# Patient Record
Sex: MALE | Race: WHITE | Employment: OTHER | URBAN - METROPOLITAN AREA
[De-identification: names, ages, dates, MRNs, and addresses within clinical notes are randomized per-mention and may not be internally consistent; named-entity substitution may affect disease eponyms.]

---

## 2017-01-01 ENCOUNTER — ANESTHESIA EVENT (OUTPATIENT)
Dept: SURGERY | Age: 82
DRG: 502 | End: 2017-01-01
Payer: MEDICARE

## 2017-01-01 ENCOUNTER — DOCUMENTATION ONLY (OUTPATIENT)
Dept: FAMILY MEDICINE CLINIC | Age: 82
End: 2017-01-01

## 2017-01-01 ENCOUNTER — HOME CARE VISIT (OUTPATIENT)
Dept: SCHEDULING | Facility: HOME HEALTH | Age: 82
End: 2017-01-01
Payer: MEDICARE

## 2017-01-01 ENCOUNTER — HOSPITAL ENCOUNTER (OUTPATIENT)
Age: 82
Setting detail: OBSERVATION
Discharge: HOME HEALTH | End: 2017-08-04
Attending: EMERGENCY MEDICINE | Admitting: FAMILY MEDICINE
Payer: MEDICARE

## 2017-01-01 ENCOUNTER — APPOINTMENT (OUTPATIENT)
Dept: GENERAL RADIOLOGY | Age: 82
End: 2017-01-01
Attending: EMERGENCY MEDICINE
Payer: MEDICARE

## 2017-01-01 ENCOUNTER — DOCUMENTATION ONLY (OUTPATIENT)
Dept: CARDIOLOGY CLINIC | Age: 82
End: 2017-01-01

## 2017-01-01 ENCOUNTER — APPOINTMENT (OUTPATIENT)
Dept: GENERAL RADIOLOGY | Age: 82
DRG: 502 | End: 2017-01-01
Attending: EMERGENCY MEDICINE
Payer: MEDICARE

## 2017-01-01 ENCOUNTER — PATIENT OUTREACH (OUTPATIENT)
Dept: FAMILY MEDICINE CLINIC | Age: 82
End: 2017-01-01

## 2017-01-01 ENCOUNTER — TELEPHONE (OUTPATIENT)
Dept: FAMILY MEDICINE CLINIC | Age: 82
End: 2017-01-01

## 2017-01-01 ENCOUNTER — PATIENT OUTREACH (OUTPATIENT)
Dept: CASE MANAGEMENT | Age: 82
End: 2017-01-01

## 2017-01-01 ENCOUNTER — CLINICAL SUPPORT (OUTPATIENT)
Dept: CARDIOLOGY CLINIC | Age: 82
End: 2017-01-01

## 2017-01-01 ENCOUNTER — OFFICE VISIT (OUTPATIENT)
Dept: FAMILY MEDICINE CLINIC | Age: 82
End: 2017-01-01

## 2017-01-01 ENCOUNTER — HOSPITAL ENCOUNTER (INPATIENT)
Age: 82
LOS: 6 days | Discharge: NURSING, REHAB OR LONG-TERM CARE | DRG: 502 | End: 2017-09-06
Attending: EMERGENCY MEDICINE | Admitting: FAMILY MEDICINE
Payer: MEDICARE

## 2017-01-01 ENCOUNTER — HOSPITAL ENCOUNTER (OUTPATIENT)
Dept: LAB | Age: 82
Discharge: HOME | End: 2017-04-04
Payer: MEDICARE

## 2017-01-01 ENCOUNTER — TELEPHONE (OUTPATIENT)
Dept: CARDIOLOGY CLINIC | Age: 82
End: 2017-01-01

## 2017-01-01 ENCOUNTER — HOME CARE VISIT (OUTPATIENT)
Dept: HOME HEALTH SERVICES | Facility: HOME HEALTH | Age: 82
End: 2017-01-01
Payer: MEDICARE

## 2017-01-01 ENCOUNTER — HOME HEALTH ADMISSION (OUTPATIENT)
Dept: HOME HEALTH SERVICES | Facility: HOME HEALTH | Age: 82
End: 2017-01-01
Payer: MEDICARE

## 2017-01-01 ENCOUNTER — OFFICE VISIT (OUTPATIENT)
Dept: CARDIOLOGY CLINIC | Age: 82
End: 2017-01-01

## 2017-01-01 ENCOUNTER — ANESTHESIA (OUTPATIENT)
Dept: SURGERY | Age: 82
DRG: 502 | End: 2017-01-01
Payer: MEDICARE

## 2017-01-01 ENCOUNTER — APPOINTMENT (OUTPATIENT)
Dept: CT IMAGING | Age: 82
End: 2017-01-01
Attending: EMERGENCY MEDICINE
Payer: MEDICARE

## 2017-01-01 ENCOUNTER — APPOINTMENT (OUTPATIENT)
Dept: GENERAL RADIOLOGY | Age: 82
End: 2017-01-01
Attending: FAMILY MEDICINE
Payer: MEDICARE

## 2017-01-01 VITALS
TEMPERATURE: 98.8 F | HEART RATE: 70 BPM | SYSTOLIC BLOOD PRESSURE: 118 MMHG | OXYGEN SATURATION: 96 % | RESPIRATION RATE: 18 BRPM | DIASTOLIC BLOOD PRESSURE: 67 MMHG | WEIGHT: 205.4 LBS | HEIGHT: 74 IN | BODY MASS INDEX: 26.36 KG/M2

## 2017-01-01 VITALS
RESPIRATION RATE: 16 BRPM | HEART RATE: 72 BPM | TEMPERATURE: 99.1 F | OXYGEN SATURATION: 98 % | SYSTOLIC BLOOD PRESSURE: 142 MMHG | DIASTOLIC BLOOD PRESSURE: 82 MMHG

## 2017-01-01 VITALS
DIASTOLIC BLOOD PRESSURE: 84 MMHG | RESPIRATION RATE: 16 BRPM | TEMPERATURE: 98.6 F | HEART RATE: 84 BPM | SYSTOLIC BLOOD PRESSURE: 146 MMHG | OXYGEN SATURATION: 98 %

## 2017-01-01 VITALS
HEART RATE: 75 BPM | SYSTOLIC BLOOD PRESSURE: 150 MMHG | TEMPERATURE: 97.9 F | DIASTOLIC BLOOD PRESSURE: 88 MMHG | OXYGEN SATURATION: 97 %

## 2017-01-01 VITALS
HEIGHT: 75 IN | RESPIRATION RATE: 16 BRPM | HEART RATE: 76 BPM | DIASTOLIC BLOOD PRESSURE: 70 MMHG | WEIGHT: 208 LBS | OXYGEN SATURATION: 97 % | SYSTOLIC BLOOD PRESSURE: 110 MMHG | BODY MASS INDEX: 25.86 KG/M2

## 2017-01-01 VITALS
TEMPERATURE: 98.7 F | RESPIRATION RATE: 18 BRPM | DIASTOLIC BLOOD PRESSURE: 79 MMHG | OXYGEN SATURATION: 96 % | SYSTOLIC BLOOD PRESSURE: 130 MMHG | HEART RATE: 71 BPM

## 2017-01-01 VITALS
SYSTOLIC BLOOD PRESSURE: 110 MMHG | RESPIRATION RATE: 16 BRPM | HEART RATE: 87 BPM | DIASTOLIC BLOOD PRESSURE: 70 MMHG | TEMPERATURE: 97.9 F | OXYGEN SATURATION: 94 %

## 2017-01-01 VITALS
HEIGHT: 74 IN | DIASTOLIC BLOOD PRESSURE: 83 MMHG | BODY MASS INDEX: 25.61 KG/M2 | OXYGEN SATURATION: 93 % | RESPIRATION RATE: 16 BRPM | WEIGHT: 199.52 LBS | HEART RATE: 79 BPM | TEMPERATURE: 98.7 F | SYSTOLIC BLOOD PRESSURE: 154 MMHG

## 2017-01-01 VITALS
HEART RATE: 68 BPM | OXYGEN SATURATION: 94 % | DIASTOLIC BLOOD PRESSURE: 75 MMHG | TEMPERATURE: 97.7 F | SYSTOLIC BLOOD PRESSURE: 139 MMHG

## 2017-01-01 VITALS
DIASTOLIC BLOOD PRESSURE: 78 MMHG | SYSTOLIC BLOOD PRESSURE: 116 MMHG | HEART RATE: 78 BPM | TEMPERATURE: 97.6 F | RESPIRATION RATE: 18 BRPM | OXYGEN SATURATION: 98 %

## 2017-01-01 VITALS
OXYGEN SATURATION: 98 % | RESPIRATION RATE: 18 BRPM | SYSTOLIC BLOOD PRESSURE: 122 MMHG | DIASTOLIC BLOOD PRESSURE: 76 MMHG | TEMPERATURE: 97.6 F

## 2017-01-01 VITALS
HEART RATE: 78 BPM | SYSTOLIC BLOOD PRESSURE: 136 MMHG | OXYGEN SATURATION: 98 % | TEMPERATURE: 99.3 F | DIASTOLIC BLOOD PRESSURE: 68 MMHG

## 2017-01-01 VITALS
TEMPERATURE: 97.7 F | HEIGHT: 74 IN | RESPIRATION RATE: 18 BRPM | DIASTOLIC BLOOD PRESSURE: 79 MMHG | BODY MASS INDEX: 28.49 KG/M2 | OXYGEN SATURATION: 92 % | HEART RATE: 80 BPM | SYSTOLIC BLOOD PRESSURE: 124 MMHG | WEIGHT: 222 LBS

## 2017-01-01 VITALS
BODY MASS INDEX: 28.49 KG/M2 | HEART RATE: 52 BPM | SYSTOLIC BLOOD PRESSURE: 120 MMHG | OXYGEN SATURATION: 95 % | TEMPERATURE: 97.9 F | WEIGHT: 222 LBS | DIASTOLIC BLOOD PRESSURE: 66 MMHG | HEIGHT: 74 IN | RESPIRATION RATE: 16 BRPM

## 2017-01-01 VITALS
HEART RATE: 72 BPM | SYSTOLIC BLOOD PRESSURE: 123 MMHG | DIASTOLIC BLOOD PRESSURE: 72 MMHG | TEMPERATURE: 97.4 F | OXYGEN SATURATION: 93 %

## 2017-01-01 VITALS
DIASTOLIC BLOOD PRESSURE: 74 MMHG | HEART RATE: 78 BPM | SYSTOLIC BLOOD PRESSURE: 126 MMHG | OXYGEN SATURATION: 98 % | RESPIRATION RATE: 18 BRPM | TEMPERATURE: 97.4 F

## 2017-01-01 VITALS
TEMPERATURE: 98.1 F | SYSTOLIC BLOOD PRESSURE: 120 MMHG | OXYGEN SATURATION: 94 % | HEART RATE: 80 BPM | DIASTOLIC BLOOD PRESSURE: 84 MMHG | RESPIRATION RATE: 16 BRPM

## 2017-01-01 VITALS
RESPIRATION RATE: 16 BRPM | SYSTOLIC BLOOD PRESSURE: 122 MMHG | DIASTOLIC BLOOD PRESSURE: 76 MMHG | HEART RATE: 86 BPM | HEIGHT: 74 IN

## 2017-01-01 VITALS
OXYGEN SATURATION: 96 % | DIASTOLIC BLOOD PRESSURE: 70 MMHG | TEMPERATURE: 97.5 F | RESPIRATION RATE: 20 BRPM | SYSTOLIC BLOOD PRESSURE: 148 MMHG | HEART RATE: 86 BPM

## 2017-01-01 DIAGNOSIS — R73.9 HYPERGLYCEMIA: ICD-10-CM

## 2017-01-01 DIAGNOSIS — I49.5 TACHYCARDIA-BRADYCARDIA SYNDROME (HCC): Primary | ICD-10-CM

## 2017-01-01 DIAGNOSIS — Z00.00 MEDICARE ANNUAL WELLNESS VISIT, SUBSEQUENT: Primary | ICD-10-CM

## 2017-01-01 DIAGNOSIS — T73.0XXA STARVATION, INITIAL ENCOUNTER: Primary | ICD-10-CM

## 2017-01-01 DIAGNOSIS — R53.1 WEAKNESS GENERALIZED: ICD-10-CM

## 2017-01-01 DIAGNOSIS — I48.0 PAROXYSMAL ATRIAL FIBRILLATION (HCC): ICD-10-CM

## 2017-01-01 DIAGNOSIS — I49.5 TACHYCARDIA-BRADYCARDIA SYNDROME (HCC): ICD-10-CM

## 2017-01-01 DIAGNOSIS — Z13.39 SCREENING FOR ALCOHOLISM: ICD-10-CM

## 2017-01-01 DIAGNOSIS — R41.82 ALTERED MENTAL STATUS, UNSPECIFIED ALTERED MENTAL STATUS TYPE: Primary | ICD-10-CM

## 2017-01-01 DIAGNOSIS — F03.90 DEMENTIA WITHOUT BEHAVIORAL DISTURBANCE, UNSPECIFIED DEMENTIA TYPE: ICD-10-CM

## 2017-01-01 DIAGNOSIS — R26.9 GAIT DISTURBANCE: Primary | ICD-10-CM

## 2017-01-01 DIAGNOSIS — G31.1 SENILE DEGENERATION OF BRAIN, NOT ELSEWHERE CLASSIFIED (HCC): Primary | ICD-10-CM

## 2017-01-01 DIAGNOSIS — Z95.0 CARDIAC PACEMAKER IN SITU: Primary | ICD-10-CM

## 2017-01-01 DIAGNOSIS — I10 ESSENTIAL HYPERTENSION: ICD-10-CM

## 2017-01-01 DIAGNOSIS — M85.80 OSTEOPENIA, UNSPECIFIED LOCATION: ICD-10-CM

## 2017-01-01 DIAGNOSIS — I35.0 AORTIC VALVE STENOSIS, UNSPECIFIED ETIOLOGY: ICD-10-CM

## 2017-01-01 DIAGNOSIS — E87.6 HYPOKALEMIA: ICD-10-CM

## 2017-01-01 DIAGNOSIS — R41.82 ALTERED MENTAL STATUS, UNSPECIFIED ALTERED MENTAL STATUS TYPE: ICD-10-CM

## 2017-01-01 DIAGNOSIS — I25.10 CORONARY ARTERY DISEASE INVOLVING NATIVE CORONARY ARTERY OF NATIVE HEART WITHOUT ANGINA PECTORIS: Primary | ICD-10-CM

## 2017-01-01 DIAGNOSIS — M19.90 ARTHRITIS: ICD-10-CM

## 2017-01-01 DIAGNOSIS — I25.10 CORONARY ARTERY DISEASE INVOLVING NATIVE CORONARY ARTERY OF NATIVE HEART WITHOUT ANGINA PECTORIS: ICD-10-CM

## 2017-01-01 DIAGNOSIS — M70.41 PREPATELLAR BURSITIS OF RIGHT KNEE: Primary | ICD-10-CM

## 2017-01-01 DIAGNOSIS — R50.9 FEVER, UNSPECIFIED FEVER CAUSE: Primary | ICD-10-CM

## 2017-01-01 DIAGNOSIS — I35.0 AORTIC VALVE STENOSIS, ETIOLOGY OF CARDIAC VALVE DISEASE UNSPECIFIED: ICD-10-CM

## 2017-01-01 DIAGNOSIS — E55.9 VITAMIN D DEFICIENCY: ICD-10-CM

## 2017-01-01 DIAGNOSIS — E78.00 PURE HYPERCHOLESTEROLEMIA: ICD-10-CM

## 2017-01-01 LAB
25(OH)D3+25(OH)D2 SERPL-MCNC: 29.4 NG/ML (ref 30–100)
ALBUMIN SERPL BCP-MCNC: 3.6 G/DL (ref 3.5–5)
ALBUMIN SERPL-MCNC: 3.2 G/DL (ref 3.5–5)
ALBUMIN SERPL-MCNC: 4.5 G/DL (ref 3.5–4.7)
ALBUMIN/GLOB SERPL: 0.6 {RATIO} (ref 1.1–2.2)
ALBUMIN/GLOB SERPL: 1 {RATIO} (ref 1.1–2.2)
ALBUMIN/GLOB SERPL: 1.9 {RATIO} (ref 1.2–2.2)
ALP SERPL-CCNC: 55 U/L (ref 45–117)
ALP SERPL-CCNC: 57 IU/L (ref 39–117)
ALP SERPL-CCNC: 88 U/L (ref 45–117)
ALT SERPL-CCNC: 16 IU/L (ref 0–44)
ALT SERPL-CCNC: 19 U/L (ref 12–78)
ALT SERPL-CCNC: 27 U/L (ref 12–78)
AMMONIA PLAS-SCNC: 19 UMOL/L
ANION GAP BLD CALC-SCNC: 6 MMOL/L (ref 5–15)
ANION GAP BLD CALC-SCNC: 8 MMOL/L (ref 5–15)
ANION GAP SERPL CALC-SCNC: 10 MMOL/L (ref 5–15)
ANION GAP SERPL CALC-SCNC: 7 MMOL/L (ref 5–15)
ANION GAP SERPL CALC-SCNC: 8 MMOL/L (ref 5–15)
ANION GAP SERPL CALC-SCNC: 8 MMOL/L (ref 5–15)
APPEARANCE FLD: ABNORMAL
APPEARANCE UR: ABNORMAL
APPEARANCE UR: CLEAR
APPEARANCE UR: CLEAR
ARTERIAL PATENCY WRIST A: ABNORMAL
AST SERPL W P-5'-P-CCNC: 18 U/L (ref 15–37)
AST SERPL-CCNC: 16 U/L (ref 15–37)
AST SERPL-CCNC: 21 IU/L (ref 0–40)
ATRIAL RATE: 85 BPM
ATRIAL RATE: 85 BPM
BACTERIA SPEC CULT: ABNORMAL
BACTERIA SPEC CULT: NORMAL
BACTERIA URNS QL MICRO: NEGATIVE /HPF
BACTERIA URNS QL MICRO: NEGATIVE /HPF
BASE EXCESS BLD CALC-SCNC: 5 MMOL/L
BASOPHILS # BLD AUTO: 0 K/UL (ref 0–0.1)
BASOPHILS # BLD AUTO: 0 X10E3/UL (ref 0–0.2)
BASOPHILS # BLD: 0 % (ref 0–1)
BASOPHILS # BLD: 0 K/UL (ref 0–0.1)
BASOPHILS NFR BLD AUTO: 1 %
BASOPHILS NFR BLD: 0 % (ref 0–1)
BASOPHILS NFR BLD: 0 % (ref 0–1)
BASOPHILS NFR BLD: 1 % (ref 0–1)
BDY SITE: ABNORMAL
BILIRUB SERPL-MCNC: 0.5 MG/DL (ref 0.2–1)
BILIRUB SERPL-MCNC: 0.5 MG/DL (ref 0–1.2)
BILIRUB SERPL-MCNC: 0.7 MG/DL (ref 0.2–1)
BILIRUB UR QL CFM: NEGATIVE
BILIRUB UR QL STRIP: NEGATIVE
BILIRUB UR QL: NEGATIVE
BODY FLD TYPE: NORMAL
BUN SERPL-MCNC: 10 MG/DL (ref 6–20)
BUN SERPL-MCNC: 10 MG/DL (ref 6–20)
BUN SERPL-MCNC: 12 MG/DL (ref 6–20)
BUN SERPL-MCNC: 13 MG/DL (ref 6–20)
BUN SERPL-MCNC: 13 MG/DL (ref 8–27)
BUN SERPL-MCNC: 14 MG/DL (ref 6–20)
BUN SERPL-MCNC: 14 MG/DL (ref 8–27)
BUN SERPL-MCNC: 15 MG/DL (ref 6–20)
BUN/CREAT SERPL: 13 (ref 12–20)
BUN/CREAT SERPL: 14 (ref 10–24)
BUN/CREAT SERPL: 14 (ref 10–24)
BUN/CREAT SERPL: 14 (ref 12–20)
BUN/CREAT SERPL: 15 (ref 12–20)
BUN/CREAT SERPL: 16 (ref 12–20)
CA-I BLD-SCNC: 1.32 MMOL/L (ref 1.12–1.32)
CALCIUM SERPL-MCNC: 10.2 MG/DL (ref 8.6–10.2)
CALCIUM SERPL-MCNC: 10.2 MG/DL (ref 8.6–10.2)
CALCIUM SERPL-MCNC: 10.3 MG/DL (ref 8.5–10.1)
CALCIUM SERPL-MCNC: 9 MG/DL (ref 8.5–10.1)
CALCIUM SERPL-MCNC: 9 MG/DL (ref 8.5–10.1)
CALCIUM SERPL-MCNC: 9.2 MG/DL (ref 8.5–10.1)
CALCIUM SERPL-MCNC: 9.3 MG/DL (ref 8.5–10.1)
CALCIUM SERPL-MCNC: 9.8 MG/DL (ref 8.5–10.1)
CALCULATED P AXIS, ECG09: 23 DEGREES
CALCULATED P AXIS, ECG09: 46 DEGREES
CALCULATED R AXIS, ECG10: 20 DEGREES
CALCULATED R AXIS, ECG10: 31 DEGREES
CALCULATED T AXIS, ECG11: 29 DEGREES
CALCULATED T AXIS, ECG11: 41 DEGREES
CC UR VC: NORMAL
CHLORIDE SERPL-SCNC: 100 MMOL/L (ref 97–108)
CHLORIDE SERPL-SCNC: 101 MMOL/L (ref 96–106)
CHLORIDE SERPL-SCNC: 101 MMOL/L (ref 96–106)
CHLORIDE SERPL-SCNC: 102 MMOL/L (ref 97–108)
CHLORIDE SERPL-SCNC: 102 MMOL/L (ref 97–108)
CHLORIDE SERPL-SCNC: 103 MMOL/L (ref 97–108)
CHLORIDE SERPL-SCNC: 104 MMOL/L (ref 97–108)
CHLORIDE SERPL-SCNC: 106 MMOL/L (ref 97–108)
CHOLEST SERPL-MCNC: 177 MG/DL
CHOLEST SERPL-MCNC: 226 MG/DL (ref 100–199)
CO2 SERPL-SCNC: 23 MMOL/L (ref 18–29)
CO2 SERPL-SCNC: 23 MMOL/L (ref 18–29)
CO2 SERPL-SCNC: 23 MMOL/L (ref 21–32)
CO2 SERPL-SCNC: 24 MMOL/L (ref 21–32)
CO2 SERPL-SCNC: 25 MMOL/L (ref 21–32)
CO2 SERPL-SCNC: 26 MMOL/L (ref 21–32)
CO2 SERPL-SCNC: 27 MMOL/L (ref 21–32)
CO2 SERPL-SCNC: 27 MMOL/L (ref 21–32)
COLOR FLD: ABNORMAL
COLOR UR: ABNORMAL
COLOR UR: NORMAL
COLOR UR: YELLOW
CREAT SERPL-MCNC: 0.76 MG/DL (ref 0.7–1.3)
CREAT SERPL-MCNC: 0.78 MG/DL (ref 0.7–1.3)
CREAT SERPL-MCNC: 0.86 MG/DL (ref 0.7–1.3)
CREAT SERPL-MCNC: 0.95 MG/DL (ref 0.7–1.3)
CREAT SERPL-MCNC: 0.96 MG/DL (ref 0.76–1.27)
CREAT SERPL-MCNC: 0.96 MG/DL (ref 0.7–1.3)
CREAT SERPL-MCNC: 0.98 MG/DL (ref 0.76–1.27)
CREAT SERPL-MCNC: 1.01 MG/DL (ref 0.7–1.3)
CRP SERPL-MCNC: 14.8 MG/DL (ref 0–0.6)
CRYSTALS FLD MICRO: NORMAL
D DIMER PPP FEU-MCNC: 1.88 MG/L FEU (ref 0–0.65)
DIAGNOSIS, 93000: NORMAL
DIAGNOSIS, 93000: NORMAL
DIFFERENTIAL METHOD BLD: ABNORMAL
EOSINOPHIL # BLD AUTO: 0.2 X10E3/UL (ref 0–0.4)
EOSINOPHIL # BLD: 0 K/UL (ref 0–0.4)
EOSINOPHIL # BLD: 0 K/UL (ref 0–0.4)
EOSINOPHIL # BLD: 0.1 K/UL (ref 0–0.4)
EOSINOPHIL # BLD: 0.2 K/UL (ref 0–0.4)
EOSINOPHIL NFR BLD AUTO: 4 %
EOSINOPHIL NFR BLD: 0 % (ref 0–7)
EOSINOPHIL NFR BLD: 0 % (ref 0–7)
EOSINOPHIL NFR BLD: 1 % (ref 0–7)
EOSINOPHIL NFR BLD: 3 % (ref 0–7)
EPITH CASTS URNS QL MICRO: ABNORMAL /LPF
EPITH CASTS URNS QL MICRO: NORMAL /LPF
ERYTHROCYTE [DISTWIDTH] IN BLOOD BY AUTOMATED COUNT: 12.5 % (ref 11.5–14.5)
ERYTHROCYTE [DISTWIDTH] IN BLOOD BY AUTOMATED COUNT: 12.6 % (ref 11.5–14.5)
ERYTHROCYTE [DISTWIDTH] IN BLOOD BY AUTOMATED COUNT: 12.6 % (ref 11.5–14.5)
ERYTHROCYTE [DISTWIDTH] IN BLOOD BY AUTOMATED COUNT: 12.9 % (ref 11.5–14.5)
ERYTHROCYTE [DISTWIDTH] IN BLOOD BY AUTOMATED COUNT: 14 % (ref 12.3–15.4)
ERYTHROCYTE [SEDIMENTATION RATE] IN BLOOD: 57 MM/HR (ref 0–20)
EST. AVERAGE GLUCOSE BLD GHB EST-MCNC: 117 MG/DL
GAS FLOW.O2 O2 DELIVERY SYS: ABNORMAL L/MIN
GLOBULIN SER CALC-MCNC: 2.4 G/DL (ref 1.5–4.5)
GLOBULIN SER CALC-MCNC: 3.6 G/DL (ref 2–4)
GLOBULIN SER CALC-MCNC: 5 G/DL (ref 2–4)
GLUCOSE SERPL-MCNC: 105 MG/DL (ref 65–100)
GLUCOSE SERPL-MCNC: 108 MG/DL (ref 65–100)
GLUCOSE SERPL-MCNC: 111 MG/DL (ref 65–100)
GLUCOSE SERPL-MCNC: 120 MG/DL (ref 65–100)
GLUCOSE SERPL-MCNC: 126 MG/DL (ref 65–100)
GLUCOSE SERPL-MCNC: 127 MG/DL (ref 65–100)
GLUCOSE SERPL-MCNC: 149 MG/DL (ref 65–99)
GLUCOSE SERPL-MCNC: 97 MG/DL (ref 65–99)
GLUCOSE UR QL: NEGATIVE
GLUCOSE UR STRIP.AUTO-MCNC: NEGATIVE MG/DL
GLUCOSE UR STRIP.AUTO-MCNC: NEGATIVE MG/DL
GRAM STN SPEC: ABNORMAL
HBA1C MFR BLD: 5.7 % (ref 4.8–5.6)
HCO3 BLD-SCNC: 28.2 MMOL/L (ref 22–26)
HCT VFR BLD AUTO: 34.4 % (ref 36.6–50.3)
HCT VFR BLD AUTO: 34.9 % (ref 36.6–50.3)
HCT VFR BLD AUTO: 38.6 % (ref 36.6–50.3)
HCT VFR BLD AUTO: 39.7 % (ref 37.5–51)
HCT VFR BLD AUTO: 40 % (ref 36.6–50.3)
HDLC SERPL-MCNC: 49 MG/DL
HDLC SERPL-MCNC: 50 MG/DL
HDLC SERPL: 3.6 {RATIO} (ref 0–5)
HGB BLD-MCNC: 11.5 G/DL (ref 12.1–17)
HGB BLD-MCNC: 11.5 G/DL (ref 12.1–17)
HGB BLD-MCNC: 13 G/DL (ref 12.1–17)
HGB BLD-MCNC: 13.4 G/DL (ref 12.1–17)
HGB BLD-MCNC: 13.4 G/DL (ref 12.6–17.7)
HGB UR QL STRIP: NEGATIVE
HYALINE CASTS URNS QL MICRO: ABNORMAL /LPF (ref 0–5)
HYALINE CASTS URNS QL MICRO: NORMAL /LPF (ref 0–5)
IMM GRANULOCYTES # BLD: 0 X10E3/UL (ref 0–0.1)
IMM GRANULOCYTES NFR BLD: 0 %
INTERPRETATION, 910389: NORMAL
KETONES UR QL STRIP.AUTO: NEGATIVE MG/DL
KETONES UR QL STRIP.AUTO: NEGATIVE MG/DL
KETONES UR QL STRIP: NEGATIVE
LACTATE SERPL-SCNC: 0.7 MMOL/L (ref 0.4–2)
LACTATE SERPL-SCNC: 0.8 MMOL/L (ref 0.4–2)
LACTATE SERPL-SCNC: 1.2 MMOL/L (ref 0.4–2)
LACTATE SERPL-SCNC: 1.4 MMOL/L (ref 0.4–2)
LACTATE SERPL-SCNC: 1.9 MMOL/L (ref 0.4–2)
LDLC SERPL CALC-MCNC: 110 MG/DL (ref 0–100)
LDLC SERPL CALC-MCNC: 149 MG/DL (ref 0–99)
LEUKOCYTE ESTERASE UR QL STRIP.AUTO: NEGATIVE
LEUKOCYTE ESTERASE UR QL STRIP.AUTO: NEGATIVE
LEUKOCYTE ESTERASE UR QL STRIP: NEGATIVE
LIPID PROFILE,FLP: ABNORMAL
LYMPHOCYTES # BLD AUTO: 1.6 X10E3/UL (ref 0.7–3.1)
LYMPHOCYTES # BLD AUTO: 21 % (ref 12–49)
LYMPHOCYTES # BLD: 0.8 K/UL (ref 0.8–3.5)
LYMPHOCYTES # BLD: 1.1 K/UL (ref 0.8–3.5)
LYMPHOCYTES # BLD: 1.4 K/UL (ref 0.8–3.5)
LYMPHOCYTES # BLD: 1.5 K/UL (ref 0.8–3.5)
LYMPHOCYTES NFR BLD AUTO: 37 %
LYMPHOCYTES NFR BLD: 10 % (ref 12–49)
LYMPHOCYTES NFR BLD: 15 % (ref 12–49)
LYMPHOCYTES NFR BLD: 24 % (ref 12–49)
LYMPHOCYTES NFR FLD: 1 %
MAGNESIUM SERPL-MCNC: 1.9 MG/DL (ref 1.6–2.4)
MAGNESIUM SERPL-MCNC: 2.5 MG/DL (ref 1.6–2.3)
MCH RBC QN AUTO: 28.8 PG (ref 26–34)
MCH RBC QN AUTO: 29.1 PG (ref 26–34)
MCH RBC QN AUTO: 29.5 PG (ref 26–34)
MCH RBC QN AUTO: 29.8 PG (ref 26.6–33)
MCH RBC QN AUTO: 30.1 PG (ref 26–34)
MCHC RBC AUTO-ENTMCNC: 33 G/DL (ref 30–36.5)
MCHC RBC AUTO-ENTMCNC: 33.4 G/DL (ref 30–36.5)
MCHC RBC AUTO-ENTMCNC: 33.5 G/DL (ref 30–36.5)
MCHC RBC AUTO-ENTMCNC: 33.7 G/DL (ref 30–36.5)
MCHC RBC AUTO-ENTMCNC: 33.8 G/DL (ref 31.5–35.7)
MCV RBC AUTO: 87.1 FL (ref 80–99)
MCV RBC AUTO: 87.3 FL (ref 80–99)
MCV RBC AUTO: 88 FL (ref 79–97)
MCV RBC AUTO: 88.1 FL (ref 80–99)
MCV RBC AUTO: 89.4 FL (ref 80–99)
MICRO URNS: NORMAL
MONOCYTES # BLD AUTO: 0.3 X10E3/UL (ref 0.1–0.9)
MONOCYTES # BLD: 0.5 K/UL (ref 0–1)
MONOCYTES # BLD: 0.6 K/UL (ref 0–1)
MONOCYTES # BLD: 1 K/UL (ref 0–1)
MONOCYTES # BLD: 1.2 K/UL (ref 0–1)
MONOCYTES NFR BLD AUTO: 15 % (ref 5–13)
MONOCYTES NFR BLD AUTO: 7 %
MONOCYTES NFR BLD: 11 % (ref 5–13)
MONOCYTES NFR BLD: 11 % (ref 5–13)
MONOCYTES NFR BLD: 8 % (ref 5–13)
MONOS+MACROS NFR FLD: 2 %
NEUTROPHILS # BLD AUTO: 2.3 X10E3/UL (ref 1.4–7)
NEUTROPHILS NFR BLD AUTO: 51 %
NEUTROPHILS NFR FLD: 97 %
NEUTS SEG # BLD: 2.3 K/UL (ref 1.8–8)
NEUTS SEG # BLD: 4 K/UL (ref 1.8–8)
NEUTS SEG # BLD: 7.1 K/UL (ref 1.8–8)
NEUTS SEG # BLD: 9.1 K/UL (ref 1.8–8)
NEUTS SEG NFR BLD AUTO: 64 % (ref 32–75)
NEUTS SEG NFR BLD: 64 % (ref 32–75)
NEUTS SEG NFR BLD: 73 % (ref 32–75)
NEUTS SEG NFR BLD: 79 % (ref 32–75)
NITRITE UR QL STRIP.AUTO: NEGATIVE
NITRITE UR QL STRIP.AUTO: NEGATIVE
NITRITE UR QL STRIP: NEGATIVE
NUC CELL # FLD: ABNORMAL /CU MM (ref 0–5)
P-R INTERVAL, ECG05: 168 MS
P-R INTERVAL, ECG05: 170 MS
PATH REV BLD -IMP: ABNORMAL
PCO2 BLD: 38.3 MMHG (ref 35–45)
PH BLD: 7.48 [PH] (ref 7.35–7.45)
PH UR STRIP: 5 [PH] (ref 5–8)
PH UR STRIP: 6 [PH] (ref 5–8)
PH UR STRIP: 7 [PH] (ref 5–7.5)
PLATELET # BLD AUTO: 150 K/UL (ref 150–400)
PLATELET # BLD AUTO: 220 X10E3/UL (ref 150–379)
PLATELET # BLD AUTO: 237 K/UL (ref 150–400)
PLATELET # BLD AUTO: 261 K/UL (ref 150–400)
PLATELET # BLD AUTO: 280 K/UL (ref 150–400)
PO2 BLD: 31 MMHG (ref 80–100)
POTASSIUM SERPL-SCNC: 3.4 MMOL/L (ref 3.5–5.1)
POTASSIUM SERPL-SCNC: 3.6 MMOL/L (ref 3.5–5.1)
POTASSIUM SERPL-SCNC: 3.6 MMOL/L (ref 3.5–5.1)
POTASSIUM SERPL-SCNC: 3.7 MMOL/L (ref 3.5–5.1)
POTASSIUM SERPL-SCNC: 3.8 MMOL/L (ref 3.5–5.1)
POTASSIUM SERPL-SCNC: 4.6 MMOL/L (ref 3.5–5.2)
POTASSIUM SERPL-SCNC: 4.7 MMOL/L (ref 3.5–5.2)
PROT SERPL-MCNC: 6.9 G/DL (ref 6–8.5)
PROT SERPL-MCNC: 7.2 G/DL (ref 6.4–8.2)
PROT SERPL-MCNC: 8.2 G/DL (ref 6.4–8.2)
PROT UR QL STRIP: NEGATIVE
PROT UR STRIP-MCNC: NEGATIVE MG/DL
PROT UR STRIP-MCNC: NEGATIVE MG/DL
Q-T INTERVAL, ECG07: 342 MS
Q-T INTERVAL, ECG07: 352 MS
QRS DURATION, ECG06: 78 MS
QRS DURATION, ECG06: 78 MS
QTC CALCULATION (BEZET), ECG08: 406 MS
QTC CALCULATION (BEZET), ECG08: 418 MS
RBC # BLD AUTO: 3.95 M/UL (ref 4.1–5.7)
RBC # BLD AUTO: 4 M/UL (ref 4.1–5.7)
RBC # BLD AUTO: 4.32 M/UL (ref 4.1–5.7)
RBC # BLD AUTO: 4.5 X10E6/UL (ref 4.14–5.8)
RBC # BLD AUTO: 4.54 M/UL (ref 4.1–5.7)
RBC # FLD: >100 /CU MM
RBC #/AREA URNS HPF: ABNORMAL /HPF (ref 0–5)
RBC #/AREA URNS HPF: NORMAL /HPF (ref 0–5)
RBC MORPH BLD: ABNORMAL
SAO2 % BLD: 65 % (ref 92–97)
SERVICE CMNT-IMP: ABNORMAL
SERVICE CMNT-IMP: NORMAL
SODIUM SERPL-SCNC: 134 MMOL/L (ref 136–145)
SODIUM SERPL-SCNC: 135 MMOL/L (ref 136–145)
SODIUM SERPL-SCNC: 135 MMOL/L (ref 136–145)
SODIUM SERPL-SCNC: 136 MMOL/L (ref 136–145)
SODIUM SERPL-SCNC: 137 MMOL/L (ref 136–145)
SODIUM SERPL-SCNC: 139 MMOL/L (ref 134–144)
SODIUM SERPL-SCNC: 139 MMOL/L (ref 136–145)
SODIUM SERPL-SCNC: 140 MMOL/L (ref 134–144)
SP GR UR REFRACTOMETRY: 1.01 (ref 1–1.03)
SP GR UR REFRACTOMETRY: 1.02 (ref 1–1.03)
SP GR UR: 1.02 (ref 1–1.03)
SPECIMEN SOURCE FLD: ABNORMAL
SPECIMEN TYPE: ABNORMAL
TRIGL SERPL-MCNC: 135 MG/DL (ref 0–149)
TRIGL SERPL-MCNC: 90 MG/DL (ref ?–150)
TROPONIN I SERPL-MCNC: <0.04 NG/ML
TSH SERPL DL<=0.005 MIU/L-ACNC: 1.76 UIU/ML (ref 0.45–4.5)
TSH SERPL DL<=0.05 MIU/L-ACNC: 1.09 UIU/ML (ref 0.36–3.74)
UA: UC IF INDICATED,UAUC: NORMAL
URATE SERPL-MCNC: 3.7 MG/DL (ref 3.5–7.2)
UROBILINOGEN UR QL STRIP.AUTO: 0.2 EU/DL (ref 0.2–1)
UROBILINOGEN UR QL STRIP.AUTO: 0.2 EU/DL (ref 0.2–1)
UROBILINOGEN UR STRIP-MCNC: 0.2 MG/DL (ref 0.2–1)
VENTRICULAR RATE, ECG03: 85 BPM
VENTRICULAR RATE, ECG03: 85 BPM
VIT B12 SERPL-MCNC: 272 PG/ML (ref 211–911)
VIT B12 SERPL-MCNC: 444 PG/ML (ref 211–911)
VLDLC SERPL CALC-MCNC: 18 MG/DL
VLDLC SERPL CALC-MCNC: 27 MG/DL (ref 5–40)
WBC # BLD AUTO: 11.5 K/UL (ref 4.1–11.1)
WBC # BLD AUTO: 3.7 K/UL (ref 4.1–11.1)
WBC # BLD AUTO: 4.3 X10E3/UL (ref 3.4–10.8)
WBC # BLD AUTO: 6.3 K/UL (ref 4.1–11.1)
WBC # BLD AUTO: 9.7 K/UL (ref 4.1–11.1)
WBC URNS QL MICRO: ABNORMAL /HPF (ref 0–4)
WBC URNS QL MICRO: NORMAL /HPF (ref 0–4)

## 2017-01-01 PROCEDURE — 74011250636 HC RX REV CODE- 250/636: Performed by: INTERNAL MEDICINE

## 2017-01-01 PROCEDURE — 3331090002 HH PPS REVENUE DEBIT

## 2017-01-01 PROCEDURE — G8988 SELF CARE GOAL STATUS: HCPCS

## 2017-01-01 PROCEDURE — 3331090001 HH PPS REVENUE CREDIT

## 2017-01-01 PROCEDURE — 65270000029 HC RM PRIVATE

## 2017-01-01 PROCEDURE — 89050 BODY FLUID CELL COUNT: CPT | Performed by: PHYSICIAN ASSISTANT

## 2017-01-01 PROCEDURE — 84550 ASSAY OF BLOOD/URIC ACID: CPT | Performed by: EMERGENCY MEDICINE

## 2017-01-01 PROCEDURE — 77030013079 HC BLNKT BAIR HGGR 3M -A: Performed by: ANESTHESIOLOGY

## 2017-01-01 PROCEDURE — 74011250637 HC RX REV CODE- 250/637: Performed by: FAMILY MEDICINE

## 2017-01-01 PROCEDURE — G0299 HHS/HOSPICE OF RN EA 15 MIN: HCPCS

## 2017-01-01 PROCEDURE — 0MDN0ZZ EXTRACTION OF RIGHT KNEE BURSA AND LIGAMENT, OPEN APPROACH: ICD-10-PCS | Performed by: ORTHOPAEDIC SURGERY

## 2017-01-01 PROCEDURE — 74011250637 HC RX REV CODE- 250/637: Performed by: HOSPITALIST

## 2017-01-01 PROCEDURE — 80061 LIPID PANEL: CPT | Performed by: FAMILY MEDICINE

## 2017-01-01 PROCEDURE — 87147 CULTURE TYPE IMMUNOLOGIC: CPT | Performed by: PHYSICIAN ASSISTANT

## 2017-01-01 PROCEDURE — 77030005563 HC CATH URETH INT MMGH -A

## 2017-01-01 PROCEDURE — G0157 HHC PT ASSISTANT EA 15: HCPCS

## 2017-01-01 PROCEDURE — 74011250636 HC RX REV CODE- 250/636: Performed by: ORTHOPAEDIC SURGERY

## 2017-01-01 PROCEDURE — 96361 HYDRATE IV INFUSION ADD-ON: CPT

## 2017-01-01 PROCEDURE — 80048 BASIC METABOLIC PNL TOTAL CA: CPT | Performed by: FAMILY MEDICINE

## 2017-01-01 PROCEDURE — 89060 EXAM SYNOVIAL FLUID CRYSTALS: CPT | Performed by: PHYSICIAN ASSISTANT

## 2017-01-01 PROCEDURE — G0151 HHCP-SERV OF PT,EA 15 MIN: HCPCS

## 2017-01-01 PROCEDURE — 36415 COLL VENOUS BLD VENIPUNCTURE: CPT | Performed by: FAMILY MEDICINE

## 2017-01-01 PROCEDURE — 87040 BLOOD CULTURE FOR BACTERIA: CPT | Performed by: EMERGENCY MEDICINE

## 2017-01-01 PROCEDURE — 99285 EMERGENCY DEPT VISIT HI MDM: CPT

## 2017-01-01 PROCEDURE — 74011250636 HC RX REV CODE- 250/636: Performed by: FAMILY MEDICINE

## 2017-01-01 PROCEDURE — 85025 COMPLETE CBC W/AUTO DIFF WBC: CPT | Performed by: FAMILY MEDICINE

## 2017-01-01 PROCEDURE — 85025 COMPLETE CBC W/AUTO DIFF WBC: CPT | Performed by: HOSPITALIST

## 2017-01-01 PROCEDURE — 0M9N3ZX DRAINAGE OF RIGHT KNEE BURSA AND LIGAMENT, PERCUTANEOUS APPROACH, DIAGNOSTIC: ICD-10-PCS | Performed by: ORTHOPAEDIC SURGERY

## 2017-01-01 PROCEDURE — 74011250636 HC RX REV CODE- 250/636: Performed by: EMERGENCY MEDICINE

## 2017-01-01 PROCEDURE — 97161 PT EVAL LOW COMPLEX 20 MIN: CPT

## 2017-01-01 PROCEDURE — 87186 SC STD MICRODIL/AGAR DIL: CPT | Performed by: PHYSICIAN ASSISTANT

## 2017-01-01 PROCEDURE — 80053 COMPREHEN METABOLIC PANEL: CPT | Performed by: EMERGENCY MEDICINE

## 2017-01-01 PROCEDURE — 76210000016 HC OR PH I REC 1 TO 1.5 HR: Performed by: ORTHOPAEDIC SURGERY

## 2017-01-01 PROCEDURE — 93971 EXTREMITY STUDY: CPT

## 2017-01-01 PROCEDURE — 85025 COMPLETE CBC W/AUTO DIFF WBC: CPT | Performed by: EMERGENCY MEDICINE

## 2017-01-01 PROCEDURE — 87205 SMEAR GRAM STAIN: CPT | Performed by: PHYSICIAN ASSISTANT

## 2017-01-01 PROCEDURE — 74011250636 HC RX REV CODE- 250/636

## 2017-01-01 PROCEDURE — 77030011640 HC PAD GRND REM COVD -A: Performed by: ORTHOPAEDIC SURGERY

## 2017-01-01 PROCEDURE — 0M9N0ZX DRAINAGE OF RIGHT KNEE BURSA AND LIGAMENT, OPEN APPROACH, DIAGNOSTIC: ICD-10-PCS | Performed by: ORTHOPAEDIC SURGERY

## 2017-01-01 PROCEDURE — 36415 COLL VENOUS BLD VENIPUNCTURE: CPT | Performed by: HOSPITALIST

## 2017-01-01 PROCEDURE — 73560 X-RAY EXAM OF KNEE 1 OR 2: CPT

## 2017-01-01 PROCEDURE — 73030 X-RAY EXAM OF SHOULDER: CPT

## 2017-01-01 PROCEDURE — 97165 OT EVAL LOW COMPLEX 30 MIN: CPT

## 2017-01-01 PROCEDURE — 87086 URINE CULTURE/COLONY COUNT: CPT | Performed by: EMERGENCY MEDICINE

## 2017-01-01 PROCEDURE — 70450 CT HEAD/BRAIN W/O DYE: CPT

## 2017-01-01 PROCEDURE — 81001 URINALYSIS AUTO W/SCOPE: CPT | Performed by: EMERGENCY MEDICINE

## 2017-01-01 PROCEDURE — 51701 INSERT BLADDER CATHETER: CPT

## 2017-01-01 PROCEDURE — 87040 BLOOD CULTURE FOR BACTERIA: CPT | Performed by: HOSPITALIST

## 2017-01-01 PROCEDURE — 99218 HC RM OBSERVATION: CPT

## 2017-01-01 PROCEDURE — 74011000250 HC RX REV CODE- 250: Performed by: FAMILY MEDICINE

## 2017-01-01 PROCEDURE — 77030010509 HC AIRWY LMA MSK TELE -A: Performed by: ANESTHESIOLOGY

## 2017-01-01 PROCEDURE — 87040 BLOOD CULTURE FOR BACTERIA: CPT | Performed by: FAMILY MEDICINE

## 2017-01-01 PROCEDURE — 36415 COLL VENOUS BLD VENIPUNCTURE: CPT

## 2017-01-01 PROCEDURE — 84484 ASSAY OF TROPONIN QUANT: CPT | Performed by: EMERGENCY MEDICINE

## 2017-01-01 PROCEDURE — 83735 ASSAY OF MAGNESIUM: CPT

## 2017-01-01 PROCEDURE — 74011000258 HC RX REV CODE- 258: Performed by: FAMILY MEDICINE

## 2017-01-01 PROCEDURE — 85652 RBC SED RATE AUTOMATED: CPT | Performed by: EMERGENCY MEDICINE

## 2017-01-01 PROCEDURE — 400013 HH SOC

## 2017-01-01 PROCEDURE — 96365 THER/PROPH/DIAG IV INF INIT: CPT

## 2017-01-01 PROCEDURE — 81001 URINALYSIS AUTO W/SCOPE: CPT | Performed by: FAMILY MEDICINE

## 2017-01-01 PROCEDURE — 83605 ASSAY OF LACTIC ACID: CPT | Performed by: EMERGENCY MEDICINE

## 2017-01-01 PROCEDURE — 96360 HYDRATION IV INFUSION INIT: CPT

## 2017-01-01 PROCEDURE — 97530 THERAPEUTIC ACTIVITIES: CPT

## 2017-01-01 PROCEDURE — 36415 COLL VENOUS BLD VENIPUNCTURE: CPT | Performed by: ORTHOPAEDIC SURGERY

## 2017-01-01 PROCEDURE — 84484 ASSAY OF TROPONIN QUANT: CPT | Performed by: FAMILY MEDICINE

## 2017-01-01 PROCEDURE — 97530 THERAPEUTIC ACTIVITIES: CPT | Performed by: PHYSICAL THERAPIST

## 2017-01-01 PROCEDURE — 83605 ASSAY OF LACTIC ACID: CPT | Performed by: FAMILY MEDICINE

## 2017-01-01 PROCEDURE — 82607 VITAMIN B-12: CPT | Performed by: HOSPITALIST

## 2017-01-01 PROCEDURE — 74011000250 HC RX REV CODE- 250

## 2017-01-01 PROCEDURE — G8978 MOBILITY CURRENT STATUS: HCPCS

## 2017-01-01 PROCEDURE — 86140 C-REACTIVE PROTEIN: CPT | Performed by: EMERGENCY MEDICINE

## 2017-01-01 PROCEDURE — 82803 BLOOD GASES ANY COMBINATION: CPT

## 2017-01-01 PROCEDURE — 93005 ELECTROCARDIOGRAM TRACING: CPT

## 2017-01-01 PROCEDURE — 71010 XR CHEST PORT: CPT

## 2017-01-01 PROCEDURE — 77030018836 HC SOL IRR NACL ICUM -A: Performed by: ORTHOPAEDIC SURGERY

## 2017-01-01 PROCEDURE — 77030013481 HC CUF TRNQT ZIMM -A: Performed by: ORTHOPAEDIC SURGERY

## 2017-01-01 PROCEDURE — 76010000149 HC OR TIME 1 TO 1.5 HR: Performed by: ORTHOPAEDIC SURGERY

## 2017-01-01 PROCEDURE — 80048 BASIC METABOLIC PNL TOTAL CA: CPT | Performed by: ORTHOPAEDIC SURGERY

## 2017-01-01 PROCEDURE — 80048 BASIC METABOLIC PNL TOTAL CA: CPT | Performed by: HOSPITALIST

## 2017-01-01 PROCEDURE — 83605 ASSAY OF LACTIC ACID: CPT | Performed by: HOSPITALIST

## 2017-01-01 PROCEDURE — 85379 FIBRIN DEGRADATION QUANT: CPT | Performed by: FAMILY MEDICINE

## 2017-01-01 PROCEDURE — 87077 CULTURE AEROBIC IDENTIFY: CPT | Performed by: PHYSICIAN ASSISTANT

## 2017-01-01 PROCEDURE — 77030014366 HC DRN WND BNTM -A: Performed by: ORTHOPAEDIC SURGERY

## 2017-01-01 PROCEDURE — G8979 MOBILITY GOAL STATUS: HCPCS

## 2017-01-01 PROCEDURE — 84443 ASSAY THYROID STIM HORMONE: CPT | Performed by: FAMILY MEDICINE

## 2017-01-01 PROCEDURE — 82607 VITAMIN B-12: CPT | Performed by: FAMILY MEDICINE

## 2017-01-01 PROCEDURE — 76060000033 HC ANESTHESIA 1 TO 1.5 HR: Performed by: ORTHOPAEDIC SURGERY

## 2017-01-01 PROCEDURE — 82140 ASSAY OF AMMONIA: CPT | Performed by: FAMILY MEDICINE

## 2017-01-01 PROCEDURE — 84132 ASSAY OF SERUM POTASSIUM: CPT | Performed by: HOSPITALIST

## 2017-01-01 PROCEDURE — 74011000250 HC RX REV CODE- 250: Performed by: ORTHOPAEDIC SURGERY

## 2017-01-01 PROCEDURE — 77030002916 HC SUT ETHLN J&J -A: Performed by: ORTHOPAEDIC SURGERY

## 2017-01-01 PROCEDURE — 77030028224 HC PDNG CST BSNM -A: Performed by: ORTHOPAEDIC SURGERY

## 2017-01-01 PROCEDURE — 83735 ASSAY OF MAGNESIUM: CPT | Performed by: FAMILY MEDICINE

## 2017-01-01 PROCEDURE — 97161 PT EVAL LOW COMPLEX 20 MIN: CPT | Performed by: PHYSICAL THERAPIST

## 2017-01-01 PROCEDURE — G8987 SELF CARE CURRENT STATUS: HCPCS

## 2017-01-01 PROCEDURE — 80048 BASIC METABOLIC PNL TOTAL CA: CPT

## 2017-01-01 RX ORDER — SODIUM CHLORIDE 0.9 % (FLUSH) 0.9 %
5-10 SYRINGE (ML) INJECTION AS NEEDED
Status: DISCONTINUED | OUTPATIENT
Start: 2017-01-01 | End: 2017-01-01 | Stop reason: HOSPADM

## 2017-01-01 RX ORDER — DOCUSATE SODIUM 100 MG/1
100 CAPSULE, LIQUID FILLED ORAL 2 TIMES DAILY
Status: DISCONTINUED | OUTPATIENT
Start: 2017-01-01 | End: 2017-01-01 | Stop reason: HOSPADM

## 2017-01-01 RX ORDER — PROPOFOL 10 MG/ML
INJECTION, EMULSION INTRAVENOUS
Status: DISCONTINUED | OUTPATIENT
Start: 2017-01-01 | End: 2017-01-01 | Stop reason: HOSPADM

## 2017-01-01 RX ORDER — VANCOMYCIN/0.9 % SOD CHLORIDE 1.5G/250ML
1500 PLASTIC BAG, INJECTION (ML) INTRAVENOUS
Status: DISCONTINUED | OUTPATIENT
Start: 2017-01-01 | End: 2017-01-01

## 2017-01-01 RX ORDER — FENTANYL CITRATE 50 UG/ML
INJECTION, SOLUTION INTRAMUSCULAR; INTRAVENOUS
Status: COMPLETED
Start: 2017-01-01 | End: 2017-01-01

## 2017-01-01 RX ORDER — HEPARIN SODIUM 5000 [USP'U]/ML
5000 INJECTION, SOLUTION INTRAVENOUS; SUBCUTANEOUS EVERY 12 HOURS
Status: DISCONTINUED | OUTPATIENT
Start: 2017-01-01 | End: 2017-01-01

## 2017-01-01 RX ORDER — SODIUM CHLORIDE 9 MG/ML
1000 INJECTION, SOLUTION INTRAVENOUS ONCE
Status: COMPLETED | OUTPATIENT
Start: 2017-01-01 | End: 2017-01-01

## 2017-01-01 RX ORDER — VANCOMYCIN/0.9 % SOD CHLORIDE 1.5G/250ML
1500 PLASTIC BAG, INJECTION (ML) INTRAVENOUS EVERY 24 HOURS
Status: DISCONTINUED | OUTPATIENT
Start: 2017-01-01 | End: 2017-01-01 | Stop reason: DRUGHIGH

## 2017-01-01 RX ORDER — SODIUM CHLORIDE 9 MG/ML
50 INJECTION, SOLUTION INTRAVENOUS CONTINUOUS
Status: DISCONTINUED | OUTPATIENT
Start: 2017-01-01 | End: 2017-01-01

## 2017-01-01 RX ORDER — LIDOCAINE HYDROCHLORIDE 20 MG/ML
INJECTION, SOLUTION EPIDURAL; INFILTRATION; INTRACAUDAL; PERINEURAL AS NEEDED
Status: DISCONTINUED | OUTPATIENT
Start: 2017-01-01 | End: 2017-01-01 | Stop reason: HOSPADM

## 2017-01-01 RX ORDER — ACETAMINOPHEN 325 MG/1
650 TABLET ORAL
COMMUNITY

## 2017-01-01 RX ORDER — HYDRALAZINE HYDROCHLORIDE 20 MG/ML
10 INJECTION INTRAMUSCULAR; INTRAVENOUS
Status: DISCONTINUED | OUTPATIENT
Start: 2017-01-01 | End: 2017-01-01 | Stop reason: HOSPADM

## 2017-01-01 RX ORDER — MELATONIN
2000 DAILY
Status: DISCONTINUED | OUTPATIENT
Start: 2017-01-01 | End: 2017-01-01 | Stop reason: HOSPADM

## 2017-01-01 RX ORDER — LANOLIN ALCOHOL/MO/W.PET/CERES
1000 CREAM (GRAM) TOPICAL DAILY
Status: DISCONTINUED | OUTPATIENT
Start: 2017-01-01 | End: 2017-01-01 | Stop reason: HOSPADM

## 2017-01-01 RX ORDER — ASPIRIN 81 MG/1
81 TABLET ORAL DAILY
Status: DISCONTINUED | OUTPATIENT
Start: 2017-01-01 | End: 2017-01-01 | Stop reason: HOSPADM

## 2017-01-01 RX ORDER — ASPIRIN 81 MG/1
81 TABLET ORAL DAILY
COMMUNITY

## 2017-01-01 RX ORDER — DOCUSATE SODIUM 100 MG/1
100 CAPSULE, LIQUID FILLED ORAL 2 TIMES DAILY
COMMUNITY

## 2017-01-01 RX ORDER — ESCITALOPRAM OXALATE 10 MG/1
TABLET ORAL
Refills: 5 | COMMUNITY
Start: 2017-01-01 | End: 2017-01-01

## 2017-01-01 RX ORDER — HEPARIN SODIUM 5000 [USP'U]/ML
5000 INJECTION, SOLUTION INTRAVENOUS; SUBCUTANEOUS EVERY 12 HOURS
Status: DISCONTINUED | OUTPATIENT
Start: 2017-01-01 | End: 2017-01-01 | Stop reason: HOSPADM

## 2017-01-01 RX ORDER — HYDROCODONE BITARTRATE AND ACETAMINOPHEN 5; 325 MG/1; MG/1
1 TABLET ORAL
Status: DISCONTINUED | OUTPATIENT
Start: 2017-01-01 | End: 2017-01-01 | Stop reason: HOSPADM

## 2017-01-01 RX ORDER — ACETAMINOPHEN 325 MG/1
650 TABLET ORAL
Status: DISCONTINUED | OUTPATIENT
Start: 2017-01-01 | End: 2017-01-01 | Stop reason: HOSPADM

## 2017-01-01 RX ORDER — ALFUZOSIN HYDROCHLORIDE 10 MG/1
10 TABLET, EXTENDED RELEASE ORAL EVERY EVENING
COMMUNITY

## 2017-01-01 RX ORDER — PHENYLEPHRINE HCL IN 0.9% NACL 0.4MG/10ML
SYRINGE (ML) INTRAVENOUS AS NEEDED
Status: DISCONTINUED | OUTPATIENT
Start: 2017-01-01 | End: 2017-01-01 | Stop reason: HOSPADM

## 2017-01-01 RX ORDER — ESCITALOPRAM OXALATE 10 MG/1
10 TABLET ORAL
Status: DISCONTINUED | OUTPATIENT
Start: 2017-01-01 | End: 2017-01-01 | Stop reason: HOSPADM

## 2017-01-01 RX ORDER — METOPROLOL SUCCINATE 25 MG/1
25 TABLET, EXTENDED RELEASE ORAL DAILY
Status: DISCONTINUED | OUTPATIENT
Start: 2017-01-01 | End: 2017-01-01 | Stop reason: HOSPADM

## 2017-01-01 RX ORDER — SODIUM CHLORIDE, SODIUM LACTATE, POTASSIUM CHLORIDE, CALCIUM CHLORIDE 600; 310; 30; 20 MG/100ML; MG/100ML; MG/100ML; MG/100ML
INJECTION, SOLUTION INTRAVENOUS
Status: DISCONTINUED | OUTPATIENT
Start: 2017-01-01 | End: 2017-01-01 | Stop reason: HOSPADM

## 2017-01-01 RX ORDER — SODIUM CHLORIDE 9 MG/ML
75 INJECTION, SOLUTION INTRAVENOUS CONTINUOUS
Status: DISCONTINUED | OUTPATIENT
Start: 2017-01-01 | End: 2017-01-01

## 2017-01-01 RX ORDER — METOPROLOL SUCCINATE 25 MG/1
TABLET, EXTENDED RELEASE ORAL
Qty: 90 TAB | Refills: 0 | Status: SHIPPED | OUTPATIENT
Start: 2017-01-01 | End: 2017-01-01

## 2017-01-01 RX ORDER — ESCITALOPRAM OXALATE 10 MG/1
10 TABLET ORAL
COMMUNITY

## 2017-01-01 RX ORDER — CEFAZOLIN SODIUM IN 0.9 % NACL 2 G/50 ML
2 INTRAVENOUS SOLUTION, PIGGYBACK (ML) INTRAVENOUS EVERY 8 HOURS
Status: DISCONTINUED | OUTPATIENT
Start: 2017-01-01 | End: 2017-01-01 | Stop reason: HOSPADM

## 2017-01-01 RX ORDER — TAMSULOSIN HYDROCHLORIDE 0.4 MG/1
0.4 CAPSULE ORAL
Status: DISCONTINUED | OUTPATIENT
Start: 2017-01-01 | End: 2017-01-01 | Stop reason: HOSPADM

## 2017-01-01 RX ORDER — METOPROLOL SUCCINATE 25 MG/1
TABLET, EXTENDED RELEASE ORAL
Qty: 30 TAB | Refills: 1 | Status: SHIPPED | OUTPATIENT
Start: 2017-01-01 | End: 2017-01-01 | Stop reason: SDUPTHER

## 2017-01-01 RX ORDER — HYDRALAZINE HYDROCHLORIDE 20 MG/ML
10 INJECTION INTRAMUSCULAR; INTRAVENOUS ONCE
Status: COMPLETED | OUTPATIENT
Start: 2017-01-01 | End: 2017-01-01

## 2017-01-01 RX ORDER — SODIUM CHLORIDE 0.9 % (FLUSH) 0.9 %
5-10 SYRINGE (ML) INJECTION EVERY 8 HOURS
Status: DISCONTINUED | OUTPATIENT
Start: 2017-01-01 | End: 2017-01-01 | Stop reason: HOSPADM

## 2017-01-01 RX ORDER — HYDROMORPHONE HYDROCHLORIDE 1 MG/ML
1 INJECTION, SOLUTION INTRAMUSCULAR; INTRAVENOUS; SUBCUTANEOUS
Status: DISCONTINUED | OUTPATIENT
Start: 2017-01-01 | End: 2017-01-01 | Stop reason: HOSPADM

## 2017-01-01 RX ORDER — PROPOFOL 10 MG/ML
INJECTION, EMULSION INTRAVENOUS AS NEEDED
Status: DISCONTINUED | OUTPATIENT
Start: 2017-01-01 | End: 2017-01-01 | Stop reason: HOSPADM

## 2017-01-01 RX ORDER — CLINDAMYCIN PHOSPHATE 600 MG/50ML
600 INJECTION INTRAVENOUS
Status: COMPLETED | OUTPATIENT
Start: 2017-01-01 | End: 2017-01-01

## 2017-01-01 RX ORDER — KETAMINE HYDROCHLORIDE 10 MG/ML
INJECTION, SOLUTION INTRAMUSCULAR; INTRAVENOUS AS NEEDED
Status: DISCONTINUED | OUTPATIENT
Start: 2017-01-01 | End: 2017-01-01 | Stop reason: HOSPADM

## 2017-01-01 RX ORDER — LANOLIN ALCOHOL/MO/W.PET/CERES
1000 CREAM (GRAM) TOPICAL DAILY
Qty: 60 TAB | Refills: 2 | Status: SHIPPED | OUTPATIENT
Start: 2017-01-01

## 2017-01-01 RX ORDER — METOPROLOL SUCCINATE 25 MG/1
25 TABLET, EXTENDED RELEASE ORAL DAILY
COMMUNITY

## 2017-01-01 RX ORDER — POTASSIUM CHLORIDE 750 MG/1
40 TABLET, FILM COATED, EXTENDED RELEASE ORAL
Status: COMPLETED | OUTPATIENT
Start: 2017-01-01 | End: 2017-01-01

## 2017-01-01 RX ORDER — CEPHALEXIN 500 MG/1
500 CAPSULE ORAL 4 TIMES DAILY
Qty: 56 CAP | Refills: 0 | Status: SHIPPED | OUTPATIENT
Start: 2017-01-01 | End: 2017-01-01

## 2017-01-01 RX ORDER — FENTANYL CITRATE 50 UG/ML
25 INJECTION, SOLUTION INTRAMUSCULAR; INTRAVENOUS
Status: DISCONTINUED | OUTPATIENT
Start: 2017-01-01 | End: 2017-01-01 | Stop reason: HOSPADM

## 2017-01-01 RX ORDER — TRAMADOL HYDROCHLORIDE 50 MG/1
50 TABLET ORAL
Status: DISCONTINUED | OUTPATIENT
Start: 2017-01-01 | End: 2017-01-01 | Stop reason: HOSPADM

## 2017-01-01 RX ORDER — ESCITALOPRAM OXALATE 10 MG/1
10 TABLET ORAL DAILY
Status: DISCONTINUED | OUTPATIENT
Start: 2017-01-01 | End: 2017-01-01 | Stop reason: HOSPADM

## 2017-01-01 RX ORDER — LANOLIN ALCOHOL/MO/W.PET/CERES
1000 CREAM (GRAM) TOPICAL DAILY
Qty: 60 TAB | Refills: 2 | Status: SHIPPED | OUTPATIENT
Start: 2017-01-01 | End: 2017-01-01

## 2017-01-01 RX ORDER — VANCOMYCIN 2 GRAM/500 ML IN 0.9 % SODIUM CHLORIDE INTRAVENOUS
2000 ONCE
Status: COMPLETED | OUTPATIENT
Start: 2017-01-01 | End: 2017-01-01

## 2017-01-01 RX ORDER — HALOPERIDOL 5 MG/ML
0.5 INJECTION INTRAMUSCULAR
Status: DISCONTINUED | OUTPATIENT
Start: 2017-01-01 | End: 2017-01-01 | Stop reason: HOSPADM

## 2017-01-01 RX ORDER — MELATONIN
2000 DAILY
COMMUNITY

## 2017-01-01 RX ORDER — NALOXONE HYDROCHLORIDE 0.4 MG/ML
0.4 INJECTION, SOLUTION INTRAMUSCULAR; INTRAVENOUS; SUBCUTANEOUS AS NEEDED
Status: DISCONTINUED | OUTPATIENT
Start: 2017-01-01 | End: 2017-01-01 | Stop reason: HOSPADM

## 2017-01-01 RX ORDER — SODIUM CHLORIDE 9 MG/ML
100 INJECTION, SOLUTION INTRAVENOUS CONTINUOUS
Status: DISCONTINUED | OUTPATIENT
Start: 2017-01-01 | End: 2017-01-01 | Stop reason: HOSPADM

## 2017-01-01 RX ORDER — MORPHINE SULFATE 4 MG/ML
INJECTION, SOLUTION INTRAMUSCULAR; INTRAVENOUS AS NEEDED
Status: DISCONTINUED | OUTPATIENT
Start: 2017-01-01 | End: 2017-01-01 | Stop reason: HOSPADM

## 2017-01-01 RX ADMIN — Medication 1000 MCG: at 09:23

## 2017-01-01 RX ADMIN — HEPARIN SODIUM 5000 UNITS: 5000 INJECTION, SOLUTION INTRAVENOUS; SUBCUTANEOUS at 23:47

## 2017-01-01 RX ADMIN — CEFAZOLIN 2 G: 1 INJECTION, POWDER, FOR SOLUTION INTRAMUSCULAR; INTRAVENOUS; PARENTERAL at 12:29

## 2017-01-01 RX ADMIN — DOCUSATE SODIUM 100 MG: 100 CAPSULE, LIQUID FILLED ORAL at 19:22

## 2017-01-01 RX ADMIN — ASPIRIN 81 MG: 81 TABLET, COATED ORAL at 08:53

## 2017-01-01 RX ADMIN — ESCITALOPRAM OXALATE 10 MG: 10 TABLET ORAL at 06:33

## 2017-01-01 RX ADMIN — ESCITALOPRAM OXALATE 10 MG: 10 TABLET ORAL at 06:17

## 2017-01-01 RX ADMIN — CEFAZOLIN 2 G: 1 INJECTION, POWDER, FOR SOLUTION INTRAMUSCULAR; INTRAVENOUS; PARENTERAL at 12:48

## 2017-01-01 RX ADMIN — FENTANYL CITRATE 25 MCG: 50 INJECTION, SOLUTION INTRAMUSCULAR; INTRAVENOUS at 09:04

## 2017-01-01 RX ADMIN — SODIUM CHLORIDE 100 ML/HR: 900 INJECTION, SOLUTION INTRAVENOUS at 11:07

## 2017-01-01 RX ADMIN — DOCUSATE SODIUM 100 MG: 100 CAPSULE, LIQUID FILLED ORAL at 17:57

## 2017-01-01 RX ADMIN — ASPIRIN 81 MG: 81 TABLET, COATED ORAL at 10:37

## 2017-01-01 RX ADMIN — POTASSIUM CHLORIDE 40 MEQ: 750 TABLET, FILM COATED, EXTENDED RELEASE ORAL at 17:57

## 2017-01-01 RX ADMIN — Medication 10 ML: at 00:19

## 2017-01-01 RX ADMIN — PROPOFOL 50 MG: 10 INJECTION, EMULSION INTRAVENOUS at 07:46

## 2017-01-01 RX ADMIN — AZTREONAM 1 G: 1 INJECTION, POWDER, LYOPHILIZED, FOR SOLUTION INTRAMUSCULAR; INTRAVENOUS at 14:28

## 2017-01-01 RX ADMIN — Medication 100 MCG: at 08:00

## 2017-01-01 RX ADMIN — HEPARIN SODIUM 5000 UNITS: 5000 INJECTION, SOLUTION INTRAVENOUS; SUBCUTANEOUS at 12:48

## 2017-01-01 RX ADMIN — ACETAMINOPHEN 650 MG: 325 TABLET, FILM COATED ORAL at 15:43

## 2017-01-01 RX ADMIN — Medication 10 ML: at 21:09

## 2017-01-01 RX ADMIN — METOPROLOL SUCCINATE 25 MG: 25 TABLET, EXTENDED RELEASE ORAL at 09:38

## 2017-01-01 RX ADMIN — DOCUSATE SODIUM 100 MG: 100 CAPSULE, LIQUID FILLED ORAL at 09:04

## 2017-01-01 RX ADMIN — ACETAMINOPHEN 650 MG: 325 TABLET, FILM COATED ORAL at 22:10

## 2017-01-01 RX ADMIN — METOPROLOL SUCCINATE 25 MG: 25 TABLET, EXTENDED RELEASE ORAL at 09:21

## 2017-01-01 RX ADMIN — ASPIRIN 81 MG: 81 TABLET, COATED ORAL at 12:01

## 2017-01-01 RX ADMIN — HEPARIN SODIUM 5000 UNITS: 5000 INJECTION, SOLUTION INTRAVENOUS; SUBCUTANEOUS at 13:31

## 2017-01-01 RX ADMIN — ESCITALOPRAM OXALATE 10 MG: 10 TABLET ORAL at 06:16

## 2017-01-01 RX ADMIN — CEFAZOLIN 2 G: 1 INJECTION, POWDER, FOR SOLUTION INTRAMUSCULAR; INTRAVENOUS; PARENTERAL at 20:21

## 2017-01-01 RX ADMIN — METOPROLOL SUCCINATE 25 MG: 25 TABLET, EXTENDED RELEASE ORAL at 09:07

## 2017-01-01 RX ADMIN — METOPROLOL SUCCINATE 25 MG: 25 TABLET, EXTENDED RELEASE ORAL at 09:04

## 2017-01-01 RX ADMIN — PROPOFOL 50 MG: 10 INJECTION, EMULSION INTRAVENOUS at 07:49

## 2017-01-01 RX ADMIN — ESCITALOPRAM OXALATE 10 MG: 10 TABLET ORAL at 12:01

## 2017-01-01 RX ADMIN — SODIUM CHLORIDE 100 ML/HR: 900 INJECTION, SOLUTION INTRAVENOUS at 09:40

## 2017-01-01 RX ADMIN — METOPROLOL SUCCINATE 25 MG: 25 TABLET, EXTENDED RELEASE ORAL at 09:23

## 2017-01-01 RX ADMIN — CEFAZOLIN 2 G: 1 INJECTION, POWDER, FOR SOLUTION INTRAMUSCULAR; INTRAVENOUS; PARENTERAL at 21:19

## 2017-01-01 RX ADMIN — Medication 10 ML: at 07:22

## 2017-01-01 RX ADMIN — Medication 10 ML: at 13:51

## 2017-01-01 RX ADMIN — Medication 5 ML: at 06:00

## 2017-01-01 RX ADMIN — ASPIRIN 81 MG: 81 TABLET, COATED ORAL at 09:38

## 2017-01-01 RX ADMIN — Medication 10 ML: at 21:00

## 2017-01-01 RX ADMIN — DOCUSATE SODIUM 100 MG: 100 CAPSULE, LIQUID FILLED ORAL at 17:44

## 2017-01-01 RX ADMIN — MORPHINE SULFATE 2 MG: 4 INJECTION, SOLUTION INTRAMUSCULAR; INTRAVENOUS at 08:15

## 2017-01-01 RX ADMIN — HEPARIN SODIUM 5000 UNITS: 5000 INJECTION, SOLUTION INTRAVENOUS; SUBCUTANEOUS at 23:55

## 2017-01-01 RX ADMIN — DOCUSATE SODIUM 100 MG: 100 CAPSULE, LIQUID FILLED ORAL at 09:09

## 2017-01-01 RX ADMIN — SODIUM CHLORIDE, SODIUM LACTATE, POTASSIUM CHLORIDE, CALCIUM CHLORIDE: 600; 310; 30; 20 INJECTION, SOLUTION INTRAVENOUS at 07:15

## 2017-01-01 RX ADMIN — DOCUSATE SODIUM 100 MG: 100 CAPSULE, LIQUID FILLED ORAL at 08:53

## 2017-01-01 RX ADMIN — METOPROLOL SUCCINATE 25 MG: 25 TABLET, EXTENDED RELEASE ORAL at 12:01

## 2017-01-01 RX ADMIN — SODIUM CHLORIDE 75 ML/HR: 900 INJECTION, SOLUTION INTRAVENOUS at 23:37

## 2017-01-01 RX ADMIN — METOPROLOL SUCCINATE 25 MG: 25 TABLET, EXTENDED RELEASE ORAL at 10:37

## 2017-01-01 RX ADMIN — TAMSULOSIN HYDROCHLORIDE 0.4 MG: 0.4 CAPSULE ORAL at 22:49

## 2017-01-01 RX ADMIN — VANCOMYCIN HYDROCHLORIDE 1500 MG: 10 INJECTION, POWDER, LYOPHILIZED, FOR SOLUTION INTRAVENOUS at 04:10

## 2017-01-01 RX ADMIN — HYDRALAZINE HYDROCHLORIDE 10 MG: 20 INJECTION INTRAMUSCULAR; INTRAVENOUS at 22:36

## 2017-01-01 RX ADMIN — ASPIRIN 81 MG: 81 TABLET, COATED ORAL at 09:04

## 2017-01-01 RX ADMIN — CEFAZOLIN 2 G: 1 INJECTION, POWDER, FOR SOLUTION INTRAMUSCULAR; INTRAVENOUS; PARENTERAL at 05:31

## 2017-01-01 RX ADMIN — KETAMINE HYDROCHLORIDE 20 MG: 10 INJECTION, SOLUTION INTRAMUSCULAR; INTRAVENOUS at 08:09

## 2017-01-01 RX ADMIN — LIDOCAINE HYDROCHLORIDE 80 MG: 20 INJECTION, SOLUTION EPIDURAL; INFILTRATION; INTRACAUDAL; PERINEURAL at 07:43

## 2017-01-01 RX ADMIN — PROPOFOL 50 MG: 10 INJECTION, EMULSION INTRAVENOUS at 07:43

## 2017-01-01 RX ADMIN — Medication 10 ML: at 22:11

## 2017-01-01 RX ADMIN — CLINDAMYCIN PHOSPHATE 600 MG: 12 INJECTION, SOLUTION INTRAMUSCULAR; INTRAVENOUS at 14:42

## 2017-01-01 RX ADMIN — DOCUSATE SODIUM 100 MG: 100 CAPSULE, LIQUID FILLED ORAL at 19:02

## 2017-01-01 RX ADMIN — DOCUSATE SODIUM 100 MG: 100 CAPSULE, LIQUID FILLED ORAL at 23:36

## 2017-01-01 RX ADMIN — SODIUM CHLORIDE 50 ML/HR: 900 INJECTION, SOLUTION INTRAVENOUS at 13:57

## 2017-01-01 RX ADMIN — Medication 10 ML: at 17:18

## 2017-01-01 RX ADMIN — TAMSULOSIN HYDROCHLORIDE 0.4 MG: 0.4 CAPSULE ORAL at 21:09

## 2017-01-01 RX ADMIN — Medication 80 MCG: at 07:43

## 2017-01-01 RX ADMIN — CEFAZOLIN 2 G: 1 INJECTION, POWDER, FOR SOLUTION INTRAMUSCULAR; INTRAVENOUS; PARENTERAL at 21:51

## 2017-01-01 RX ADMIN — Medication 100 MCG: at 07:57

## 2017-01-01 RX ADMIN — Medication 10 ML: at 23:36

## 2017-01-01 RX ADMIN — CEFAZOLIN 2 G: 1 INJECTION, POWDER, FOR SOLUTION INTRAMUSCULAR; INTRAVENOUS; PARENTERAL at 12:05

## 2017-01-01 RX ADMIN — DOCUSATE SODIUM 100 MG: 100 CAPSULE, LIQUID FILLED ORAL at 09:37

## 2017-01-01 RX ADMIN — METOPROLOL SUCCINATE 25 MG: 25 TABLET, EXTENDED RELEASE ORAL at 08:53

## 2017-01-01 RX ADMIN — ESCITALOPRAM OXALATE 10 MG: 10 TABLET ORAL at 09:22

## 2017-01-01 RX ADMIN — ESCITALOPRAM OXALATE 10 MG: 10 TABLET ORAL at 09:37

## 2017-01-01 RX ADMIN — VANCOMYCIN HYDROCHLORIDE 1500 MG: 10 INJECTION, POWDER, LYOPHILIZED, FOR SOLUTION INTRAVENOUS at 12:01

## 2017-01-01 RX ADMIN — SODIUM CHLORIDE 75 ML/HR: 900 INJECTION, SOLUTION INTRAVENOUS at 19:44

## 2017-01-01 RX ADMIN — CEFAZOLIN 2 G: 1 INJECTION, POWDER, FOR SOLUTION INTRAMUSCULAR; INTRAVENOUS; PARENTERAL at 22:34

## 2017-01-01 RX ADMIN — HEPARIN SODIUM 5000 UNITS: 5000 INJECTION, SOLUTION INTRAVENOUS; SUBCUTANEOUS at 00:18

## 2017-01-01 RX ADMIN — ESCITALOPRAM OXALATE 10 MG: 10 TABLET ORAL at 09:38

## 2017-01-01 RX ADMIN — TAMSULOSIN HYDROCHLORIDE 0.4 MG: 0.4 CAPSULE ORAL at 22:10

## 2017-01-01 RX ADMIN — Medication 1000 MCG: at 09:37

## 2017-01-01 RX ADMIN — SODIUM CHLORIDE 100 ML/HR: 900 INJECTION, SOLUTION INTRAVENOUS at 10:37

## 2017-01-01 RX ADMIN — HEPARIN SODIUM 5000 UNITS: 5000 INJECTION, SOLUTION INTRAVENOUS; SUBCUTANEOUS at 12:04

## 2017-01-01 RX ADMIN — ASPIRIN 81 MG: 81 TABLET, COATED ORAL at 09:08

## 2017-01-01 RX ADMIN — CEFAZOLIN 2 G: 1 INJECTION, POWDER, FOR SOLUTION INTRAMUSCULAR; INTRAVENOUS; PARENTERAL at 04:27

## 2017-01-01 RX ADMIN — MORPHINE SULFATE 2 MG: 4 INJECTION, SOLUTION INTRAMUSCULAR; INTRAVENOUS at 08:07

## 2017-01-01 RX ADMIN — HEPARIN SODIUM 5000 UNITS: 5000 INJECTION, SOLUTION INTRAVENOUS; SUBCUTANEOUS at 12:29

## 2017-01-01 RX ADMIN — VITAMIN D, TAB 1000IU (100/BT) 2000 UNITS: 25 TAB at 09:38

## 2017-01-01 RX ADMIN — ACETAMINOPHEN 650 MG: 325 TABLET, FILM COATED ORAL at 09:08

## 2017-01-01 RX ADMIN — METOPROLOL SUCCINATE 25 MG: 25 TABLET, EXTENDED RELEASE ORAL at 09:37

## 2017-01-01 RX ADMIN — ASPIRIN 81 MG: 81 TABLET, COATED ORAL at 09:21

## 2017-01-01 RX ADMIN — DOCUSATE SODIUM 100 MG: 100 CAPSULE, LIQUID FILLED ORAL at 10:37

## 2017-01-01 RX ADMIN — HEPARIN SODIUM 5000 UNITS: 5000 INJECTION, SOLUTION INTRAVENOUS; SUBCUTANEOUS at 12:34

## 2017-01-01 RX ADMIN — ESCITALOPRAM OXALATE 10 MG: 10 TABLET ORAL at 07:22

## 2017-01-01 RX ADMIN — FENTANYL CITRATE 25 MCG: 50 INJECTION, SOLUTION INTRAMUSCULAR; INTRAVENOUS at 09:13

## 2017-01-01 RX ADMIN — TAMSULOSIN HYDROCHLORIDE 0.4 MG: 0.4 CAPSULE ORAL at 23:35

## 2017-01-01 RX ADMIN — CEFAZOLIN 2 G: 1 INJECTION, POWDER, FOR SOLUTION INTRAMUSCULAR; INTRAVENOUS; PARENTERAL at 04:44

## 2017-01-01 RX ADMIN — VANCOMYCIN HYDROCHLORIDE 2000 MG: 10 INJECTION, POWDER, LYOPHILIZED, FOR SOLUTION INTRAVENOUS at 19:44

## 2017-01-01 RX ADMIN — Medication 5 ML: at 15:51

## 2017-01-01 RX ADMIN — Medication 10 ML: at 06:16

## 2017-01-01 RX ADMIN — PROPOFOL 50 MCG/KG/MIN: 10 INJECTION, EMULSION INTRAVENOUS at 07:54

## 2017-01-01 RX ADMIN — TAMSULOSIN HYDROCHLORIDE 0.4 MG: 0.4 CAPSULE ORAL at 22:26

## 2017-01-01 RX ADMIN — HEPARIN SODIUM 5000 UNITS: 5000 INJECTION, SOLUTION INTRAVENOUS; SUBCUTANEOUS at 23:34

## 2017-01-01 RX ADMIN — ASPIRIN 81 MG: 81 TABLET, COATED ORAL at 09:23

## 2017-01-01 RX ADMIN — TAMSULOSIN HYDROCHLORIDE 0.4 MG: 0.4 CAPSULE ORAL at 22:34

## 2017-01-01 RX ADMIN — KETAMINE HYDROCHLORIDE 10 MG: 10 INJECTION, SOLUTION INTRAMUSCULAR; INTRAVENOUS at 07:54

## 2017-01-01 RX ADMIN — Medication 10 ML: at 17:59

## 2017-01-01 RX ADMIN — CEFAZOLIN 2 G: 1 INJECTION, POWDER, FOR SOLUTION INTRAMUSCULAR; INTRAVENOUS; PARENTERAL at 12:34

## 2017-01-01 RX ADMIN — KETAMINE HYDROCHLORIDE 10 MG: 10 INJECTION, SOLUTION INTRAMUSCULAR; INTRAVENOUS at 07:57

## 2017-01-01 RX ADMIN — CEFAZOLIN 2 G: 1 INJECTION, POWDER, FOR SOLUTION INTRAMUSCULAR; INTRAVENOUS; PARENTERAL at 13:31

## 2017-01-01 RX ADMIN — Medication 10 ML: at 21:19

## 2017-01-01 RX ADMIN — VITAMIN D, TAB 1000IU (100/BT) 2000 UNITS: 25 TAB at 09:37

## 2017-01-01 RX ADMIN — SODIUM CHLORIDE 50 ML/HR: 900 INJECTION, SOLUTION INTRAVENOUS at 17:40

## 2017-01-01 RX ADMIN — TAMSULOSIN HYDROCHLORIDE 0.4 MG: 0.4 CAPSULE ORAL at 21:19

## 2017-01-01 RX ADMIN — ACETAMINOPHEN 650 MG: 325 TABLET, FILM COATED ORAL at 19:38

## 2017-01-01 RX ADMIN — AZTREONAM 1 G: 1 INJECTION, POWDER, LYOPHILIZED, FOR SOLUTION INTRAMUSCULAR; INTRAVENOUS at 22:30

## 2017-01-01 RX ADMIN — TAMSULOSIN HYDROCHLORIDE 0.4 MG: 0.4 CAPSULE ORAL at 20:54

## 2017-01-01 RX ADMIN — TAMSULOSIN HYDROCHLORIDE 0.4 MG: 0.4 CAPSULE ORAL at 20:20

## 2017-01-01 RX ADMIN — DOCUSATE SODIUM 100 MG: 100 CAPSULE, LIQUID FILLED ORAL at 17:34

## 2017-01-01 RX ADMIN — DOCUSATE SODIUM 100 MG: 100 CAPSULE, LIQUID FILLED ORAL at 09:38

## 2017-01-01 RX ADMIN — DOCUSATE SODIUM 100 MG: 100 CAPSULE, LIQUID FILLED ORAL at 09:23

## 2017-01-01 RX ADMIN — VITAMIN D, TAB 1000IU (100/BT) 2000 UNITS: 25 TAB at 09:23

## 2017-01-01 RX ADMIN — SODIUM CHLORIDE 1000 ML: 900 INJECTION, SOLUTION INTRAVENOUS at 11:49

## 2017-01-01 RX ADMIN — SODIUM CHLORIDE 100 ML/HR: 900 INJECTION, SOLUTION INTRAVENOUS at 08:42

## 2017-01-01 RX ADMIN — DOCUSATE SODIUM 100 MG: 100 CAPSULE, LIQUID FILLED ORAL at 09:21

## 2017-01-01 RX ADMIN — ACETAMINOPHEN 650 MG: 325 TABLET, FILM COATED ORAL at 22:26

## 2017-01-01 RX ADMIN — Medication 10 ML: at 13:56

## 2017-01-01 RX ADMIN — PROPOFOL 50 MG: 10 INJECTION, EMULSION INTRAVENOUS at 07:48

## 2017-01-01 RX ADMIN — CEFAZOLIN 2 G: 1 INJECTION, POWDER, FOR SOLUTION INTRAMUSCULAR; INTRAVENOUS; PARENTERAL at 04:31

## 2017-01-01 RX ADMIN — Medication 10 ML: at 06:42

## 2017-01-01 RX ADMIN — ASPIRIN 81 MG: 81 TABLET, COATED ORAL at 09:37

## 2017-03-24 NOTE — PROGRESS NOTES
Patient with NSVT on device check 3/7/17, not new but has not seen Dr. Landon Morales since Nov 2015. Will ask office to call and make patient a follow up with Dr. Landon Morales and ask Dr. Liza Salazar office to send his most current set of labs with lipid results included. Will also ask patient to have BMP and magnesium level checked prior to visit with Dr. Landon Morales.

## 2017-03-27 NOTE — TELEPHONE ENCOUNTER
He is due in for his annual visit with MD and fasting labs. Is due for his [address] but not sure he is able to do this.

## 2017-03-29 NOTE — TELEPHONE ENCOUNTER
Patient identified with 2 identifiers  Called patient and spoke with wife (HIPPA). Explained that patient has had irregular beats on Pacer check and needs follow up with Dr. Cari White. Labs ordered and she will have them done next week-lab slips waiting at desk.     Future Appointments  Date Time Provider Jessy Dotson   4/11/2017 11:20 AM Madeline Zelaya  [address]

## 2017-03-29 NOTE — LETTER
4/7/2017 9:46 AM 
 
Mr. Ivy Siemens [phone] Dear Ivy Siemens: 
 
Please find your most recent results below. Resulted Orders METABOLIC PANEL, BASIC Result Value Ref Range Glucose 149 (H) 65 - 99 mg/dL BUN 13 8 - 27 mg/dL Creatinine 0.96 0.76 - 1.27 mg/dL GFR est non-AA 72 >59 mL/min/1.73 GFR est AA 83 >59 mL/min/1.73  
 BUN/Creatinine ratio 14 10 - 24 Comment: **Please note reference interval change** Sodium 140 134 - 144 mmol/L Potassium 4.6 3.5 - 5.2 mmol/L Chloride 101 96 - 106 mmol/L  
 CO2 23 18 - 29 mmol/L Calcium 10.2 8.6 - 10.2 mg/dL Narrative Performed at:  [address]  [id number] : Melly Lopez MD, Phone:  [phone] MAGNESIUM Result Value Ref Range Magnesium 2.5 (H) 1.6 - 2.3 mg/dL Narrative Performed at:  [address]  [id number] : Melly Lopez MD, Phone:  [phone] RECOMMENDATIONS: 
\"None. Keep up the good work! \" Please call me if you have any questions: [phone] Sincerely, Sona Mccloud MD

## 2017-04-11 NOTE — MR AVS SNAPSHOT
Visit Information Date & Time Provider Department Dept. Phone Encounter #  
 4/11/2017 11:20 AM Shira Beth MD [facility] [phone] [id number] Your Appointments 5/22/2017  2:00 PM  
COMPLETE PHYSICAL with Yamini Castillo MD  
[facility] [address] [facility] Appt Note: MWV /does not want labs, he gets it done somewhere else [address] [place] [address]  
[phone]  
  
   
 [address] [facility] [address] [place] [address] Upcoming Health Maintenance Date Due DTaP/Tdap/Td series (1 - Tdap) 9/2/2011 MEDICARE YEARLY EXAM 3/17/2016 INFLUENZA AGE 9 TO ADULT 8/1/2016 GLAUCOMA SCREENING Q2Y 3/12/2017 Allergies as of 4/11/2017  Review Complete On: 4/11/2017 By: Alba Baez Severity Noted Reaction Type Reactions Erythromycin  08/06/2009    Nausea Only Pcn [Penicillins]  08/06/2009    Unknown (comments) Current Immunizations  Reviewed on 10/26/2015 Name Date H1N1 FLU VACCINE 1/11/2010 Influenza High Dose Vaccine PF 9/18/2015, 9/12/2014 Influenza Vaccine 9/29/2014 Influenza Vaccine Split 9/4/2010 Pneumococcal Conjugate (PCV-13) 11/3/2014 Pneumococcal Vaccine (Unspecified Type) 10/1/1998 TD Vaccine 9/1/2011, 9/20/2000 Zoster 3/26/2007 Not reviewed this visit You Were Diagnosed With   
  
 Codes Comments Coronary artery disease involving native coronary artery of native heart without angina pectoris    -  Primary ICD-10-CM: I25.10 ICD-9-CM: 414.01 Paroxysmal atrial fibrillation (HCC)     ICD-10-CM: I48.0 ICD-9-CM: 427.31 Aortic valve stenosis, unspecified etiology     ICD-10-CM: I35.0 ICD-9-CM: 424.1 Tachycardia-bradycardia syndrome ([address]     ICD-10-CM: I49.5 ICD-9-CM: 427.81 Vitals BP Pulse Resp Height(growth percentile) Weight(growth percentile) SpO2 110/70 (BP 1 Location: Right arm, BP Patient Position: Sitting) 76 16 6' 3\" (1.905 m) 208 lb (94.3 kg) 97% BMI Smoking Status 26 kg/m2 Former Smoker Vitals History BMI and BSA Data Body Mass Index Body Surface Area  
 26 kg/m 2 2.23 m 2 Preferred Pharmacy Pharmacy Name Phone [facility] [address] [phone] Your Updated Medication List  
  
   
This list is accurate as of: 4/11/17 12:11 PM.  Always use your most recent med list.  
  
  
  
  
 alfuzosin SR 10 mg SR tablet Commonly known as:  UROXATRAL  
TAKE ONE (1) TABLET(S) DAILY ARTHRICREAM RUB 10 % topical cream  
Generic drug:  trolamine salicylate Apply  to affected area as needed. aspirin 81 mg tablet Take 81 mg by mouth daily. AVODART 0.5 mg capsule Generic drug:  dutasteride Take 0.5 mg by mouth daily. docusate sodium 100 mg capsule Commonly known as:  Samara [place] Take 1 capsule by mouth two (2) times a day. This is available over the counter GENTEAL (HYPROMELLOSE) 0.3 % Gel ophthalmic ointment Generic drug:  artificial tears(hypromellose) Administer  to both eyes as needed. metoprolol succinate 25 mg XL tablet Commonly known as:  TOPROL-XL  
TAKE ONE TABLET BY MOUTH EVERY DAY  
  
 MIRALAX 17 gram/dose powder Generic drug:  polyethylene glycol Take 17 g by mouth daily as needed. NATURAL FIBER PO Take  by mouth. traMADol 50 mg tablet Commonly known as:  ULTRAM  
Take 50 mg by mouth two (2) times a day. Takes one -half tablet in am and 1 tablet at night TYLENOL 8 HOUR 650 mg CR tablet Generic drug:  acetaminophen Take 650 mg by mouth two (2) times a day. Takes 2 in am, and 2 at night. VITAMIN D3 1,000 unit tablet Generic drug:  cholecalciferol Take 2,000 Units by mouth daily. We Performed the Following AMB POC EKG ROUTINE W/ 12 LEADS, INTER & REP [97270 CPT(R)] Introducing [facility]! Dear Aden Olvera: Thank you for requesting a Kidblog account. Our records indicate that you have previously registered for a Kidblog account but its currently inactive. Please call our Kidblog support line at [phone]. Additional Information If you have questions, please visit the Frequently Asked Questions section of the Kidblog website at [email or web address] Remember, Kidblog is NOT to be used for urgent needs. For medical emergencies, dial 911. Now available from your iPhone and Android! Please provide this summary of care documentation to your next provider. Your primary care clinician is listed as [id number] If you have any questions after today's visit, please call [phone].

## 2017-04-11 NOTE — PROGRESS NOTES
[facility] [address]: Freddy Danger  [phone]    HPI: Brenden Davila is an [de-identified]y.o. year-old who presents for follow up regarding his PACs, PVCs, non-obstructive CAD and aortic stenosis. He feels short of breath with walking any distance, but not doing much these days. Seems to be deconditioned. Bp is stable, not checking it at home. Memory is troubling him. He is feeling tired all the time. He walks slow and does not get out of breath. He gets dizzy at times but it seems more like vertigo. He continues having trouble with his memory and that is frustrating. No chest pain, dyspnea, edema, palpitations. He is walking daily. The balance is doing ok, no falls since he started using his cane. Energy is up and down. Goes back to bed after breakfast. Up a lot at night to urinate. He is walking daily and reading more. But his memory seems to be getting worse and that is hard on him and his wife. BP is borderline low for age. Continue metoprolol due to aflutter. 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 They met at [facility]. He is a poor historian and his wife is here today with him    Having more trouble with his memory and confusion. No recent hospital admissions. He will see Dr. Lucero Cunningham soon and wants to ask him about the results from Dr. Deepak Lozano. EKG NSR with PAC, atrial bigemeny  Pacer check today    A/P  1. HTN - well controlled on metoprolol  2. Dyslipidemia- cont lipitor at 20mg daily  4. Reflux- on prilosec   5. Afib- paroxysmal, in NSR today, on aspirin, CHADS2VASC=3 (age and HTN) but patient is not interested in taking a more potent anticoagulant   6. CAD - on ASA, statin, metoprolol, nonobstructive by cath  7. Fatigue- improved after pacemaker placement, aggravated by RLS   8. Bradycardia - s/p Pacer implant 4/12  9. Aortic stenosis - mild by TTE Today  10.  NSVT on last pacer check, no symptoms, no signs of cad progression    8/15 EF 55%, ao sclerosis  12/14 EF 60, triv TR, Aov not well seen  7/13 Echo - LVEF 55-60%, mild MR, mild AS (mean gradient 17mmhg, AWA 1.3cm2), mild cLVH  5/13 EF 60% mild TR mild MR  Cath 4/12 LAD 40% ostial, RCA 40% prox, PDA 50%  3/12 EF 60, mild cLVh, mild As, mild TR, mild MR   3/12 Holter:  7 beat VT, pauses<2 seconds, PAC, PVC  Stress 3/12: afib with EKG changes, upsloping without perfusion defect    He  has a past medical history of Adhesive capsulitis of shoulder; Advance directive in chart (12/31/14); Arrhythmia; Atrial fibrillation ([facility] BPH; Cancer ([facility] (10/2010); Cancer ([facility] (8/2012); Constipation; Contact dermatitis and other eczema, due to unspecified cause; Dementia; Diverticular disease; DJD of shoulder; ED (erectile dysfunction); Fungal infection of nail ([id number]); Gallbladder calculus without cholecystitis (4/12/2016); GERD (gastroesophageal reflux disease); Headache; Hearing loss; Hip fracture, right ([address] Hypertension; Insomnia; Left knee pain (5/27/14 7/28/16); Left shoulder pain (3/31/14  1/7/16); OAB (overactive bladder); Osteopenia (3/2007); PAC (premature atrial contraction); Pacemaker; Panic attacks; PAT (paroxysmal atrial tachycardia) ([facility] Peripheral neuropathy ([facility] PVC (premature ventricular contraction); S/P laparoscopic cholecystectomy (4/25/2016); Shingles (9/2010); Tinnitus; and Varicocele. Cardiovascular ROS: no chest pain or dyspnea on exertion  Respiratory ROS: no cough, shortness of breath, or wheezing  Neurological ROS: no TIA or stroke symptoms  All other systems negative except as above. PE  Vitals:    04/11/17 1121 04/11/17 1123   BP: 120/80 110/70   Pulse: 76    Resp: 16    SpO2: 97%    Weight: 208 lb (94.3 kg)    Height: 6' 3\" (1.905 m)     Body mass index is 26 kg/(m^2).   General appearance - alert, well appearing, and in no distress  Mental status - affect appropriate to mood  Eyes - sclera anicteric, moist mucous membranes  Neck - supple, no significant adenopathy  Lymphatics - not assessed   Chest - clear to auscultation, no wheezes, rales or rhonchi  Heart - normal rate, regular rhythm, normal S1, S2, 2/6 WENDY   Abdomen - soft, nontender, nondistended, no masses or organomegaly  Back exam - full range of motion, no tenderness  Neurological - cranial nerves II through XII grossly intact, no focal deficit  Musculoskeletal - no muscular tenderness noted, normal strength  Extremities - peripheral pulses normal, no pedal edema  Skin - normal coloration  no rashes    Recent Labs:  Lab Results   Component Value Date/Time    Cholesterol, total 175 04/14/2015 08:58 AM    HDL Cholesterol 63 04/14/2015 08:58 AM    LDL, calculated 91 04/14/2015 08:58 AM    Triglyceride 106 04/14/2015 08:58 AM    CHOL/HDL Ratio 2.7 09/13/2010 08:36 AM     Lab Results   Component Value Date/Time    Creatinine 0.96 04/04/2017 11:23 AM     Lab Results   Component Value Date/Time    BUN 13 04/04/2017 11:23 AM     Lab Results   Component Value Date/Time    Potassium 4.6 04/04/2017 11:23 AM     Lab Results   Component Value Date/Time    Hemoglobin A1c 6.0 04/14/2015 08:58 AM     Lab Results   Component Value Date/Time    HGB 14.2 04/11/2016 11:52 AM     Lab Results   Component Value Date/Time    PLATELET 011 08/93/7066 11:52 AM       Reviewed:  Past Medical History:   Diagnosis Date    Adhesive capsulitis of shoulder      Teressa Storm notes-injected 3/17/16    Advance directive in chart 12/31/14    SEE SCANNED Form    Arrhythmia     Atrial fibrillation (HCC)     BPH     [facility] Megan Santiago    Cancer [facility]) 10/2010    basal cell dr Fidencio Lorenzana Cancer [facility]) 8/2012    squamous cell    Constipation     Contact dermatitis and other eczema, due to unspecified cause     ak    Dementia     7/27/15 copy of neuropsych eval    Diverticular disease     dr Kurtis Perez DJD of shoulder     dr Stoney Osorio  7/22/15 notes    ED (erectile dysfunction)     [facility]    F[facility] infection of nail [id number]    [facility]    Gallbladder calculus without cholecystitis 4/12/2016    GERD (gastroesophageal reflux disease)     reflux dr Nancy mann    Headache     migraines    Hearing loss     Hip fracture, right ([address]     Hypertension     Insomnia     Left knee pain 5/27/14 7/28/16    patellofemoral arthrosis of right knee  1/26/17 f/u    Left shoulder pain 3/31/14  1/7/16    [facility] 10/01[facility]    OAB (overactive bladder)     [facility]    Osteopenia 3/2007    PAC (premature atrial contraction)     Pacemaker     7/29/15 note about pacer check    Panic attacks     PAT (paroxysmal atrial tachycardia) (HCC)     Peripheral neuropathy (HCC)     PVC (premature ventricular contraction)     S/P laparoscopic cholecystectomy 4/25/2016    Shingles 9/2010    dr Cathy Kim   [facility] Tinnitus     Varicocele     dr Huang File     History   Smoking Status    Former Smoker    Packs/day: 1.00    Years: 30.00    Types: Cigarettes    Quit date: 1/1/1982   Smokeless Tobacco    Never Used     History   Alcohol Use No     Allergies   Allergen Reactions    Erythromycin Nausea Only    Pcn [Penicillins] Unknown (comments)       Current Outpatient Prescriptions   Medication Sig    metoprolol succinate (TOPROL-XL) 25 mg XL tablet TAKE ONE TABLET BY MOUTH EVERY DAY    traMADol (ULTRAM) 50 mg tablet Take 50 mg by mouth two (2) times a day. Takes one -half tablet in am and 1 tablet at night    artificial tears,hypromellose, (GENTEAL, HYPROMELLOSE,) 0.3 % gel ophthalmic ointment Administer  to both eyes as needed.  PSYLLIUM SEED, WITH SUGAR, (NATURAL FIBER PO) Take  by mouth.  trolamine salicylate (ARTHRICREAM RUB) 10 % topical cream Apply  to affected area as needed.  docusate sodium (COLACE) 100 mg capsule Take 1 capsule by mouth two (2) times a day. This is available over the counter    cholecalciferol (VITAMIN D3) 1,000 unit tablet Take 2,000 Units by mouth daily.     polyethylene glycol (MIRALAX) 17 gram/dose powder Take 17 g by mouth daily as needed.  alfuzosin SR (UROXATRAL) 10 mg SR tablet TAKE ONE (1) TABLET(S) DAILY    acetaminophen (TYLENOL 8 HOUR) 650 mg CR tablet Take 650 mg by mouth two (2) times a day. Takes 2 in am, and 2 at night.  aspirin 81 mg Tab Take 81 mg by mouth daily.  dutaseride (AVODART) 0.5 mg capsule Take 0.5 mg by mouth daily. No current facility-administered medications for this visit.         Jd Solis MD  [facility] [place]  [address] [facility] [address]  [address]

## 2017-04-20 NOTE — TELEPHONE ENCOUNTER
Per his insurance company he can get a 90 day rx cheaper than 3 30 day prescriptions.   He is on the schedule for follow up in May

## 2017-05-22 NOTE — MR AVS SNAPSHOT
Visit Information Date & Time Provider Department Dept. Phone Encounter #  
 5/22/2017  2:00 PM Yamini Castillo MD [facility] [phone] [id number] Follow-up Instructions Return in about 1 year (around 5/22/2018) for Meagan Montana. Your Appointments 10/12/2017 11:00 AM  
PACEMAKER with PACEMAKER[facility]SU[facility]ET [facility] (NAV SCHEDULING) Appt Note: 6 month follow up & pacer check [address] [place] [address]  
[address] [address]  
  
    
 10/12/2017 11:20 AM  
ESTABLISHED PATIENT with Shira Beth MD  
[facility] ([address]) Appt Note: 6 month follow up & pacer check [address] [place]  
[address] [address] Upcoming Health Maintenance Date Due  
 MEDICARE YEARLY EXAM 3/17/2016 GLAUCOMA SCREENING Q2Y 8/20/2017* DTaP/Tdap/Td series (1 - Tdap) 9/1/2021* INFLUENZA AGE 9 TO ADULT 8/1/2017 *Topic was postponed. The date shown is not the original due date. Allergies as of 5/22/2017  Review Complete On: 5/22/2017 By: Yamini Castillo MD  
  
 Severity Noted Reaction Type Reactions Erythromycin  08/06/2009    Nausea Only Pcn [Penicillins]  08/06/2009    Unknown (comments) Current Immunizations  Reviewed on 10/26/2015 Name Date H1N1 FLU VACCINE 1/11/2010 Influenza High Dose Vaccine PF 9/18/2015, 9/12/2014 Influenza Vaccine 9/29/2014 Influenza Vaccine Split 9/4/2010 Pneumococcal Conjugate (PCV-13) 11/3/2014 Pneumococcal Vaccine (Unspecified Type) 10/1/1998 TD Vaccine 9/1/2011, 9/20/2000 Zoster 3/26/2007 Not reviewed this visit You Were Diagnosed With   
  
 Codes Comments Osteopenia, unspecified location    -  Primary ICD-10-CM: M85.80 ICD-9-CM: 733.90   
 Essential hypertension     ICD-10-CM: I10 
ICD-9-CM: 401.9 Pure hypercholesterolemia     ICD-10-CM: E78.00 ICD-9-CM: 272.0 Hyperglycemia     ICD-10-CM: R73.9 ICD-9-CM: 790.29 Vitamin D deficiency     ICD-10-CM: E55.9 ICD-9-CM: 268.9 Coronary artery disease involving native coronary artery of native heart without angina pectoris     ICD-10-CM: I25.10 ICD-9-CM: 414.01 Dementia without behavioral disturbance, unspecified dementia type     ICD-10-CM: F03.90 ICD-9-CM: 294.20 Routine general medical examination at a health care facility     ICD-10-CM: Z00.00 ICD-9-CM: V70.0 Screening for alcoholism     ICD-10-CM: Z13.89 ICD-9-CM: V79.1 Vitals BP Pulse Temp Resp Height(growth percentile) Weight(growth percentile)  
 118/67 (BP 1 Location: Left arm, BP Patient Position: Sitting) 70 98.8 °F (37.1 °C) (Oral) 18 6' 1.5\" (1.867 m) 205 lb 6.4 oz (93.2 kg) SpO2 BMI Smoking Status 96% 26.73 kg/m2 Former Smoker Vitals History BMI and BSA Data Body Mass Index Body Surface Area  
 26.73 kg/m 2 2.2 m 2 [facility] [address] [phone] Your Updated Medication List  
  
   
This list is accurate as of: 5/22/17  2:47 PM.  Always use your most recent med list.  
  
  
  
  
 alfuzosin SR 10 mg SR tablet Commonly known as:  UROXATRAL  
TAKE ONE (1) TABLET(S) DAILY ARTHRICREAM RUB 10 % topical cream  
Generic drug:  trolamine salicylate Apply  to affected area as needed. aspirin 81 mg tablet Take 81 mg by mouth daily. docusate sodium 100 mg capsule Commonly known as:  Jericho [place] Take 1 capsule by mouth two (2) times a day. This is available over the counter  
  
 escitalopram oxalate 10 mg tablet Commonly known as:  John Ferreira TAKE ONE TABLET BY MOUTH EVERY MORNING  
  
 GENTEAL (HYPROMELLOSE) 0.3 % Gel ophthalmic ointment Generic drug:  artificial tears(hypromellose) Administer  to both eyes as needed. metoprolol succinate 25 mg XL tablet Commonly known as:  TOPROL-XL  
TAKE ONE TABLET BY MOUTH EVERY DAY  
  
 MIRALAX 17 gram/dose powder Generic drug:  polyethylene glycol Take 17 g by mouth daily as needed. NATURAL FIBER PO Take  by mouth. TYLENOL 8 HOUR 650 mg CR tablet Generic drug:  acetaminophen Take 650 mg by mouth two (2) times a day. Takes 2 in am, and 2 at night. VITAMIN D3 1,000 unit tablet Generic drug:  cholecalciferol Take 2,000 Units by mouth daily. Follow-up Instructions Return in about 1 year (around 5/22/2018) for Meagan Montana. To-Do List   
 05/22/2017 Imaging:  DEXA BONE DENSITY STUDY AXIAL Introducing [facility]! Dear Aniyah Sy: Thank you for requesting a CardioLogs account. Our records indicate that you have previously registered for a CardioLogs account but its currently inactive. Please call our CardioLogs support line at [phone]. Additional Information If you have questions, please visit the Frequently Asked Questions section of the CardioLogs website at [email or web address] Remember, CardioLogs is NOT to be used for urgent needs. For medical emergencies, dial 911. Now available from your iPhone and Android! Please provide this summary of care documentation to your next provider. Your primary care clinician is listed as [phone] If you have any questions after today's visit, please call [phone].

## 2017-05-22 NOTE — PROGRESS NOTES
HISTORY OF PRESENT ILLNESS  Camden Salazar is a 80 y.o. male. HPI   Here for MWV. Review of Systems   Constitutional: Negative. HENT: Positive for tinnitus. Eyes: Negative. Respiratory: Negative. Cardiovascular: Negative. Gastrointestinal: Positive for constipation. Genitourinary: Negative. Musculoskeletal: Positive for joint pain. Skin: Negative. Neurological: Negative. Endo/Heme/Allergies: Negative. Psychiatric/Behavioral: Positive for depression and memory loss. Physical Exam   NA    ASSESSMENT and PLAN  See below     This is a Subsequent Medicare Annual Wellness Visit providing Personalized Prevention Plan Services (PPPS) (Performed 12 months after initial AWV and PPPS )    I have reviewed the patient's medical history in detail and updated the computerized patient record. Eye doctor 1-2 yrs. Dentist 2 x annually. \"a lot\"  Colonoscopy '12. Done. Sees psych q 4 mos for med management. Card annually. Urol annually. Derm 2 x annually. GB removal past yr. No other signif hx past yr. CNA visits 9-5 M-F. Prev h/o osteopenia '12. No recent DEXA.     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    Constipation     Contact dermatitis and other eczema, due to unspecified cause     ak    Dementia     7/27/15 copy of neuropsych eval    Diverticular disease     dr Liyah Oates DJD of shoulder     dr Cecilia Barton  7/22/15 notes    ED (erectile dysfunction)     [facility]    [facility] infection of nail [id number]    [facility]    Gallbladder calculus without cholecystitis 4/12/2016    GERD (gastroesophageal reflux disease)     reflux dr Quintin Carrington mann    Headache     migraines    Hearing loss     Hip fracture, right ([address]     Hypertension     Insomnia     Left knee pain 5/27/14 7/28/16    patellofemoral arthrosis of right knee  1/26/17 f/u    Left shoulder pain 3/31/14  1/7/16    [facility] 5/5[facility]17    OAB (overactive bladder)     [facility]    Osteopenia 3/2007    PAC (premature atrial contraction)     Pacemaker     7/29/15 note about pacer check    Panic attacks     PAT (paroxysmal atrial tachycardia) (HCC)     Peripheral neuropathy (HCC)     PVC (premature ventricular contraction)     S/P laparoscopic cholecystectomy 4/25/2016    Shingles 9/2010    dr Suha Rosales Tinnitus     Varicocele     dr Minda Seip      Past Surgical History:   Procedure Laterality Date    EGD      HX APPENDECTOMY  11/2008    dr ashlee [facility]      colonoscopy    HX HEART CATHETERIZATION  4/6/2012    mild to moderate CAD in LAD and OM, RCA, less than 50% lesions    HX HEENT      cataract left    HX LAP CHOLECYSTECTOMY  4/13/16    HX ORTHOPAEDIC      rt femur fx.  HX PACEMAKER      INS PPM/ICD LED DUAL ONLY  4/26/2012         IR INSERT PACEMAKER FLUORO GUIDE  4/26/2012         ID COLONOSCOPY FLX DX W/COLLJ SPEC WHEN PFRMD  1/27/2012         ID EGD TRANSORAL BIOPSY SINGLE/MULTIPLE  1/27/2012          Current Outpatient Prescriptions   Medication Sig Dispense Refill    escitalopram oxalate (LEXAPRO) 10 mg tablet TAKE ONE TABLET BY MOUTH EVERY MORNING  5    metoprolol succinate (TOPROL-XL) 25 mg XL tablet TAKE ONE TABLET BY MOUTH EVERY DAY 90 Tab 0    artificial tears,hypromellose, (GENTEAL, HYPROMELLOSE,) 0.3 % gel ophthalmic ointment Administer  to both eyes as needed.  PSYLLIUM SEED, WITH SUGAR, (NATURAL FIBER PO) Take  by mouth.  trolamine salicylate (ARTHRICREAM RUB) 10 % topical cream Apply  to affected area as needed.       docusate sodium (COLACE) 100 mg capsule Take 1 capsule by mouth two (2) times a day. This is available over the counter 1 capsule 0    cholecalciferol (VITAMIN D3) 1,000 unit tablet Take 2,000 Units by mouth daily.  polyethylene glycol (MIRALAX) 17 gram/dose powder Take 17 g by mouth daily as needed.  alfuzosin SR (UROXATRAL) 10 mg SR tablet TAKE ONE (1) TABLET(S) DAILY 30 Tab 5    acetaminophen (TYLENOL 8 HOUR) 650 mg CR tablet Take 650 mg by mouth two (2) times a day. Takes 2 in am, and 2 at night.  aspirin 81 mg Tab Take 81 mg by mouth daily.  traMADol (ULTRAM) 50 mg tablet Take 50 mg by mouth two (2) times a day. Takes one -half tablet in am and 1 tablet at night      dutaseride (AVODART) 0.5 mg capsule Take 0.5 mg by mouth daily.        Allergies   Allergen Reactions    Erythromycin Nausea Only    Pcn [Penicillins] Unknown (comments)     Family History   Problem Relation Age of Onset    Stroke Father     Dementia Father     Heart Disease Mother     Headache Daughter     Other Sister      fibromyalgia    Arthritis-osteo Brother     Other Brother      stomach     Social History   Substance Use Topics    Smoking status: Former Smoker     Packs/day: 1.00     Years: 30.00     Types: Cigarettes     Quit date: 1/1/1982    Smokeless tobacco: Never Used    Alcohol use No     Patient Active Problem List   Diagnosis Code    Right hip pain M25.551    Allergic rhinitis, cause unspecified J30.9    Essential hypertension I10    Pure hypercholesterolemia E78.00    Hyperglycemia R73.9    Memory disturbance R41.3    Urine incontinence R32    Arthritis M19.90    GERD (gastroesophageal reflux disease) K21.9    RLS (restless legs syndrome) G25.81    Constipation K59.00    Tachycardia-bradycardia syndrome (HCC) I49.5    Sick sinus syndrome (HCC) I49.5    Chronic left shoulder pain M25.512, G89.29    Nocturia R35.1    Vitamin D deficiency E55.9    CAD (coronary artery disease) I25.10    Atrial fibrillation ([address] I48.91    Aortic stenosis I35.0    S/P placement of cardiac pacemaker Z95.0    Vasovagal syncope R55    Dementia F03.90    S/P laparoscopic cholecystectomy Z90.49    Bradycardia R00.1       Depression Risk Factor Screening:     PHQ over the last two weeks 5/22/2017   Little interest or pleasure in doing things Several days   Feeling down, depressed or hopeless Several days   Total Score PHQ 2 2     Alcohol Risk Factor Screening: On any occasion during the past 3 months, have you had more than 4 drinks containing alcohol? Not applicable    Do you average more than 14 drinks per week? Not applicable    Nondrinker    Functional Ability and Level of Safety:     Hearing Loss   borderline normal-to-mild    Activities of Daily Living   Self-care. Requires assistance with: ambulation, bathing and hygiene, grooming, toileting and dressing    Fall Risk     Fall Risk Assessment, last 12 mths 5/22/2017   Able to walk? Yes   Fall in past 12 months? No   Number of falls in past 12 months -     Abuse Screen   Patient is not abused    Review of Systems   See below    Physical Examination     Evaluation of Cognitive Function:  Mood/affect:  Complacent  Appearance: age appropriate, casually dressed and within normal Limits  Family member/caregiver input: Doing better with CNA and meds    No exam performed today, NA.     Patient Care Team:  Felipe Garrett MD as PCP - General (Family Practice)  Lorie Noble RN as Ambulatory Care Navigator (Family Practice)  Gary Vale MD (Orthopedic Surgery)  Urszula Win MD (Urology)  Belén Nielson MD (Cardiology)  Arleth Chung MD (Ophthalmology)  Giovanni Sauer MD (General Surgery)  Lucía Trejo MD as Physician (Psychiatry)  Gilmar Ybarra MD as Physician (Dermatology)    Advice/Referrals/Counseling   Education and counseling provided:  Are appropriate based on today's review and evaluation  End-of-Life planning (with patient's consent)  Pneumococcal Vaccine  Influenza Vaccine  Prostate cancer screening tests (PSA, covered annually)  Colorectal cancer screening tests  Cardiovascular screening blood test  Bone mass measurement (DEXA)  Screening for glaucoma  Diabetes screening test      Assessment/Plan       ICD-10-CM ICD-9-CM    1. Osteopenia, unspecified location M85.80 733.90 DEXA BONE DENSITY STUDY AXIAL   2. Essential hypertension I10 401.9    3. Pure hypercholesterolemia E78.00 272.0    4. Hyperglycemia R73.9 790.29    5. Vitamin D deficiency E55.9 268.9    6. Coronary artery disease involving native coronary artery of native heart without angina pectoris I25.10 414.01    7. Dementia without behavioral disturbance, unspecified dementia type F03.90 294.20    8. Routine general medical examination at a health care facility Z00.00 V70.0    9. Screening for alcoholism Z13.89 V79.1      Follow-up Disposition: Not on File.

## 2017-05-22 NOTE — LETTER
6/9/2017 9:56 AM 
 
[phone] Dear Vianca Ibrahim: 
 
Please find your most recent results below. Resulted Orders VITAMIN D, 25 HYDROXY Result Value Ref Range VITAMIN D, 25-HYDROXY 29.4 (L) 30.0 - 100.0 ng/mL Comment:  
   Vitamin D deficiency has been defined by the [facility] practice guideline as a 
level of serum 25-OH vitamin D less than 20 ng/mL (1,2). The Endocrine Society went on to further define vitamin D 
insufficiency as a level between 21 and 29 ng/mL (2). 1. IOM ([place] [employer]). 2010. Dietary reference 
   intakes for calcium and D. [facility]: The 
   [employer]. 2. Gavin MF, Nilda NC, Una SANTIAGO, et al. 
   Evaluation, treatment, and prevention of vitamin D 
   deficiency: an Endocrine Society clinical practice 
   guideline. JCEM. 2011 Jul; 96(7):1911-30. Narrative Performed at:  [address]  [id number] : Linder Lanes MD, Phone:  [phone] CBC WITH AUTOMATED DIFF Result Value Ref Range WBC 4.3 3.4 - 10.8 x10E3/uL  
 RBC 4.50 4. 14 - 5.80 x10E6/uL HGB 13.4 12.6 - 17.7 g/dL HCT 39.7 37.5 - 51.0 % MCV 88 79 - 97 fL  
 MCH 29.8 26.6 - 33.0 pg  
 MCHC 33.8 31.5 - 35.7 g/dL  
 RDW 14.0 12.3 - 15.4 % PLATELET 082 632 - 237 x10E3/uL NEUTROPHILS 51 % Lymphocytes 37 % MONOCYTES 7 % EOSINOPHILS 4 % BASOPHILS 1 %  
 ABS. NEUTROPHILS 2.3 1.4 - 7.0 x10E3/uL Abs Lymphocytes 1.6 0.7 - 3.1 x10E3/uL  
 ABS. MONOCYTES 0.3 0.1 - 0.9 x10E3/uL  
 ABS. EOSINOPHILS 0.2 0.0 - 0.4 x10E3/uL  
 ABS. BASOPHILS 0.0 0.0 - 0.2 x10E3/uL IMMATURE GRANULOCYTES 0 %  
 ABS. IMM. GRANS. 0.0 0.0 - 0.1 x10E3/uL Narrative Performed at:  [address]  [id number] : Linder Lanes MD, Phone:  [phone] URINALYSIS W/ RFLX MICROSCOPIC Result Value Ref Range Specific Gravity 1.018 1.005 - 1.030  
 pH (UA) 7.0 5.0 - 7.5 Color Yellow Yellow Appearance Clear Clear Leukocyte Esterase Negative Negative Protein Negative Negative/Trace Glucose Negative Negative Ketone Negative Negative Blood Negative Negative Bilirubin Negative Negative Urobilinogen 0.2 0.2 - 1.0 mg/dL Nitrites Negative Negative Microscopic Examination Comment Comment:  
   Microscopic not indicated and not performed. Narrative Performed at:  [address]  [id number] : Lesa Esparza MD, Phone:  [phone] TSH 3RD GENERATION Result Value Ref Range TSH 1.760 0.450 - 4.500 uIU/mL Narrative Performed at:  [address]  [id number] : Lesa Esparza MD, Phone:  [phone] METABOLIC PANEL, COMPREHENSIVE Result Value Ref Range Glucose 97 65 - 99 mg/dL BUN 14 8 - 27 mg/dL Creatinine 0.98 0.76 - 1.27 mg/dL GFR est non-AA 70 >59 mL/min/1.73 GFR est AA 81 >59 mL/min/1.73  
 BUN/Creatinine ratio 14 10 - 24 Sodium 139 134 - 144 mmol/L Potassium 4.7 3.5 - 5.2 mmol/L Chloride 101 96 - 106 mmol/L  
 CO2 23 18 - 29 mmol/L Calcium 10.2 8.6 - 10.2 mg/dL Protein, total 6.9 6.0 - 8.5 g/dL Albumin 4.5 3.5 - 4.7 g/dL GLOBULIN, TOTAL 2.4 1.5 - 4.5 g/dL A-G Ratio 1.9 1.2 - 2.2 Bilirubin, total 0.5 0.0 - 1.2 mg/dL Alk. phosphatase 57 39 - 117 IU/L  
 AST (SGOT) 21 0 - 40 IU/L  
 ALT (SGPT) 16 0 - 44 IU/L Narrative Performed at:  [address]  [id number] : Lesa Esparza MD, Phone:  [phone] LIPID PANEL Result Value Ref Range Cholesterol, total 226 (H) 100 - 199 mg/dL Triglyceride 135 0 - 149 mg/dL HDL Cholesterol 50 >39 mg/dL VLDL, calculated 27 5 - 40 mg/dL LDL, calculated 149 (H) 0 - 99 mg/dL Narrative Performed at:  [address]  [id number] : Curry Mcdowell MD, Phone:  [phone] HEMOGLOBIN A1C WITH EAG Result Value Ref Range Hemoglobin A1c 5.7 (H) 4.8 - 5.6 % Comment:  
            Pre-diabetes: 5.7 - 6.4 Diabetes: >6.4 Glycemic control for adults with diabetes: <7.0 Estimated average glucose 117 mg/dL Narrative Performed at:  [address]  [id number] : Curry Mcdowell MD, Phone:  [phone] CVD REPORT Result Value Ref Range INTERPRETATION Note Comment:  
   Supplement report is available. Narrative Performed at:  [address] [place] [address]  [id number] : Trey Yang PhD, Phone:  [phone] Earle Velázquez MD

## 2017-05-22 NOTE — ACP (ADVANCE CARE PLANNING)
Advance Care Planning    Advance Care Planning (ACP) Provider Conversation Snapshot    Date of ACP Conversation: 05/22/17  Persons included in Conversation:  patient and family  Length of ACP Conversation in minutes:  <16 minutes (Non-Billable)    Authorized Decision Maker (if patient is incapable of making informed decisions): This person is:    Other Legally Authorized Decision Maker (e.g. Next of Kin)  Wife and daughter        For Patients with Decision Making Capacity:   Values/Goals: Exploration of values, goals, and preferences if recovery is not expected, even with continued medical treatment in the event of:  Imminent death  Severe, permanent brain injury    Conversation Outcomes / Follow-Up Plan:   Reviewed existing Advance Directive

## 2017-05-22 NOTE — PROGRESS NOTES
Chief Complaint   Patient presents with   [facility] Annual Wellness Visit     1. Have you been to the ER, urgent care clinic since your last visit? Hospitalized since your last visit? No    2. Have you seen or consulted any other health care providers outside of the [address] [place] [address] since your last visit? Include any pap smears or colon screening. No     The patient was counseled on the dangers of tobacco use, and Patient is a non smoker. Reviewed strategies to maximize success, including Continue not to smoke. I have reviewed Health Maintenance with the patient and updated. Advance Care Plan is on file. Complete Physical Exam Male  Pre-Visit Questions:    1. Do you follow a low fat or low salt diet ? n  2. Do you follow an exercise program? n  3. Have you had your tetanus booster in the last 10 years? y  3. Have you ever had a Pneumonia vaccine? y  11. Do you smoke? n  6. Do you consider yourself overweight? n  7. Do you perform Testicular self exam?n  8. Is there a family history of CAD< age 48? y  5. Is there a family history of Cancer? n  10. Do you have any Cancer risks? n  11. Have you had a colonoscopy? Unsure  12. Have you been to your eye doctor past year?  n   15. Have you been to your dentist in the last 6 months?  n  14.   Have you had your flu shot for this season?  y

## 2017-06-06 NOTE — PROGRESS NOTES
Low Vit. D. Take OTC supp 8906-6576 units daily. Inc lipids off statin as expected. Average BS in low prediabetic range.   Rest all O.K.

## 2017-08-01 PROBLEM — R41.82 ALTERED MENTAL STATUS: Status: ACTIVE | Noted: 2017-01-01

## 2017-08-01 NOTE — ED NOTES
Per Dr. Alexis Shore he is not getting admitted to a medical bed and pt needs a [facility] consult. BSMART at bedside.

## 2017-08-01 NOTE — Clinical Note
Patient Class[de-identified] Observation [899] Type of Bed: Telemetry Remote [29] Reason for Observation: AMS Admitting Diagnosis: Altered mental status [780.97. ICD-9-CM] Admitting Physician: Miguelina Cancer [[id number]] Attending Physician: Chandu Quintanilla

## 2017-08-01 NOTE — H&P
[address] [place] [address], [address] [place] [address]   HISTORY AND PHYSICAL       Name:  Jeniffer Marcus   MR#:  [id number]   :  1931   Account #:  [de-identified]        Date of Adm:  2017       ATTENDING PHYSICIAN: Tenzin Dial MD    CHIEF COMPLAINT: Weakness. HISTORY OF PRESENT ILLNESS: The patient is an 80-year-old   gentleman with past medical history of BPH, hypertension, atrial   fibrillation, diverticular disease, GERD, paroxysmal atrial tachycardia,   dementia, appendectomy, who presents to the hospital with the above-  mentioned symptoms with his family. Not much history could be   obtained from the patient secondary to dementia and most of the   history was obtained from the family who was present at the bedside. The wife reports that the patient has been baseline up until yesterday   when he started experiencing more weakness, a little more lethargy,   and trouble walking. This morning, the patient woke up and was not   able to get out of bed and walk easily. The patient usually is able to   walk with help on a daily basis, but that seems to have not been   happening since this morning. The patient reports that his legs hurt,   but the caregiver states that that is normal for the patient. The patient's   family also report that the patient does not \"seem to be focusing\" and   his right eye appears to be droopy and closed more than usual, but   that seems to have resolved now. Per his wife, the patient appears to   be more confused than usual, which was earlier this morning, but the   family reports that now the patient is back to his baseline. Currently,   the patient is sitting in his bed, eating a JELL-O cup and denies any   complaints or problems. The patient is using all his extremities.  Denies   any headache, blurry vision, sore throat, trouble swallowing, trouble   with speech, any chest pain, shortness of breath, cough, fever, chills,   abdominal pain, constipation, diarrhea, urinary symptoms, focal or   generalized neurological weakness, recent travel, sick contacts or any   other concerns or problems. PAST MEDICAL HISTORY: See above. HOME MEDICATIONS: CURRENTLY THE PATIENT IS ON:   1. Acetaminophen. 2. Uroxatral 10 mg every evening. 3. Aspirin 81 mg daily. 4. Cholecalciferol 2000 units daily. 5. Colace 100 mg p.o. b.i.d.   6. Lexapro 10 mg every morning. 7. Metoprolol 25 mg daily. 8. Metamucil. SOCIAL HISTORY: Former smoker, used to smoke 1 pack per day for   30 years. No alcohol or IV drug abuse. Lives at home. REVIEW OF SYSTEMS: Could not be obtained from the patient due to   his dementia. ALLERGIES:   1. ERYTHROMYCIN   2. PENICILLIN. FAMILY HISTORY: Per chart, father had history of stroke. Father had   history of dementia. Father had history of heart disease and daughter   has history of headache. PHYSICAL EXAMINATION   VITAL SIGNS: Temperature 98.7, pulse 81, respiratory rate 26, blood   pressure 157/85, pulse oximetry 94% on 2 liters. GENERAL: Alert and oriented x1, awake, no acute distress, resting in   bed, pleasant male, appears to be stated age. HEENT: Pupils equal and reactive to light. Dry mucous membranes. Tympanic membranes clear. NECK:  Supple. CHEST: Clear to auscultation bilaterally. CORONARY S1, S2 were heard. ABDOMEN: Soft, nontender, nondistended. Bowel sounds are   physiological.   EXTREMITIES: No clubbing, no cyanosis, no edema. NEUROPSYCHIATRIC: Pleasant mood and affect. Difficult exam as   the patient is confused and not following instructions. Appears to be   able to move all 4, strength 5/5 into 4 extremities. Cranial nerves could   not be tested secondary to the patient noncompliance. Sensory grossly   within normal limits, withdraws to pain. DTRs 1+ into 4. SKIN: Warm. LABORATORY DATA: White count 3.7, hemoglobin 13, hematocrit   38.6, platelets 006.  Urine shows no signs of infection. Sodium 134,   potassium 3.7, chloride 104, bicarbonate 24, BUN 13, creatinine 0.95,   calcium 9.3, bilirubin total 0.5, albumin 3.6, ALT 19, AST 18, alkaline   phosphatase 55, lactic acid 1.2. Blood cultures are pending. Urine   culture is pending. CT of the chest shows central volume loss less than as compared to   2014. Mild white matter disease, no acute intracranial process   identified by noncontrast CT. X-ray of the chest shows no acute   process. EKG shows atrial paced rhythm with no acute ST elevation. ASSESSMENT AND PLAN:   1. Weakness with low-grade fever, unclear etiology. The patient will be   observed on a telemetry bed. I strongly feel that the patient has   underlying depression versus progression of dementia and thus needs   psych management. BSMART evaluated the patient and   recommended the patient to be admitted to the medical service. Will   start the patient on very gentle IV hydration, neurovascular checks,   TSH, B12, folate, and ammonia. Will get an echocardiogram and   continue to monitor. If symptoms persist, may consider getting an MRI   and a neurology consult, although being I felt that this is more   psychiatric in nature. We will also get a physical therapy consult and   further intervention will be per hospital course. Continue to monitor. Reassess as needed. The patient has low-grade fevers. On arrival to   the ER, the temperature was 100.2 degrees Fahrenheit. Blood cultures   have been drawn. No obvious source is available at this point of time. Will hold antibiotics until there is an obvious source and closely monitor   temperatures. Further intervention will be per hospital course. Continue   to monitor. 2. Hypertension, continue home medications. suboptimally   controlled, will add p.r.n. medications. 3. Coronary artery disease, stable. Continue to monitor. 4. Benign prostatic hypertrophy. Continue home medications.    5. Gastrointestinal and deep vein thrombosis prophylaxis. The patient   will be on sequential compression devices.         Cara Allen MD MM / Shilo Dewey   D:  08/01/2017   14:57   T:  08/01/2017   15:41   Job #:  [id number]

## 2017-08-01 NOTE — ED NOTES
TC made to 2N advising Chiara Macdonald RN that pt is still to be admitted to room 201. No changes in pt's status. Chiara Macdonald verbalized understanding. Rene Riggins

## 2017-08-01 NOTE — BSMART NOTE
Comprehensive Assessment Form Part 1      Section I - Disposition    Axis I - Dementia, Depression Unspecified   Axis II - None  Axis III -   Past Medical History:   Diagnosis Date    Adhesive capsulitis of shoulder      [place] Coad notes-injected 3/17/16    Advance directive in chart 12/31/14    SEE SCANNED Form    Arrhythmia     [facility] ([facility] [address]     B[facility]     [facility] Leigh Ann Eldridge    Cancer [facility]) 10/2010    basal cell dr Daniel Thompson [facility]) 8/2012    squamous cell    Constipation     Contact dermatitis and other eczema, due to unspecified cause     ak    Dementia     7/27/15 copy of neuropsych eval    Diverticular disease     dr William Millard DJD of shoulder     dr Aroldo Guerrier  7/22/15 notes    [facility]    [facility] [id number]    [facility]    Gallbladder calculus without cholecystitis 4/12/2016    GERD (gastroesophageal reflux disease)     reflux dr Tonya Walls    Headache     migraines    Hearing loss     Hip fracture, right ([facility] [address]     Hypertension     Insomnia     Left knee pain 5/27/14 7/28/16    patellofemoral arthrosis of right knee  1/26/17 f/u    Left shoulder pain 3/31/14  1/7/16    [facility]    Nosebleed     6/8/17 note from ENT Giovanni Truong May    [facility] bladder)     [facility]    Osteopenia 3/2007    PAC (premature atrial contraction)     Pacemaker     7/29/15 note about pacer check    Panic attacks     PAT (paroxysmal atrial tachycardia) (HCC)     Peripheral neuropathy ([facility] [address]     PVC (premature ventricular contraction)     S/P laparoscopic cholecystectomy 4/25/2016    Shingles 9/2010    dr Karen Scott Tinnitus     Varicocele     dr Merline Brave       Axis IV - Dementia   Axis V - 30      The Medical Doctor to Psychiatrist conference was not completed.   The Medical Doctor is in agreement with Psychiatrist disposition because of (reason) Psychiatric admission is not recommended at this time. The plan is admit medical.  The on-call Psychiatrist consulted was Dr. Emy Araujo. The admitting Psychiatrist will be Dr. Binh Cerda. The admitting Diagnosis is NA. The Payor source is Medicare    Section II - Integrated Summary  Summary:  Patient came in accompanied by wife, daughter, and caregiver due to weakness. Patient reportedly wasn't able to walk or get up today and seemed more confused. Patient has been diagnosed with Dementia for \"a long time\" per family  Patient has been seeing Dr. Shorty Kirk for depression related to the Dementia for about a year and takes Lexapro. Patient has no prior psychiatric admissions. Patient denied any SI or HI as well as denied  hallucinations. Patient does acknowledge depression. He is alert and disoriented. The patienthas not demonstrated mental capacity to provide informed consent. The information is given by the patient, spouse/SO, relative(s) and caregiver / friend. The Chief Complaint is weakness and confusion. The Precipitant Factors are unclear. Previous Hospitalizations: NA  The patient has not previously been in restraints. Current Psychiatrist and/or  is Dr Shorty Kirk. Lethality Assessment:    The potential for suicide noted by the following: NA . The potential for homicide is not noted. The patient has not been a perpetrator of sexual or physical abuse. There are not pending charges. The patient is not felt to be at risk for self harm or harm to others. The attending nurse was advised that security has not been notified. Section III - Psychosocial  The patient's overall mood and attitude is depressed. Feelings of helplessness and hopelessness are not observed. Generalized anxiety is not observed. Panic is not observed. Phobias are not observed. Obsessive compulsive tendencies are not observed. Section IV - Mental Status Exam  The patient's appearance shows no evidence of impairment.   The patient's behavior shows no evidence of impairment. The patient is disoriented. The patient's speech shows no evidence of impairment. The patient's mood is depressed. The range of affect is flat. The patient's thought content demonstrates no evidence of impairment. The thought process shows no evidence of impairment. The patient's perception shows no evidence of impairment. The patient's memory is impaired. The patient's appetite shows no evidence of impairment. The patient's sleep has evidence of insomnia. The patient shows no insight. The patient's judgement is cognitively impaired. Section V - Substance Abuse  The patient is not using substances. Section VI - Living Arrangements  The patient is . The spouses approximate age is [de-identified] and appears to be in good health. The patient lives with a spouse. The patient does plan to return home upon discharge. The patient does not have legal issues pending. The patient's source of income comes from social security. Mormon and cultural practices have not been voiced at this time. The patient's greatest support comes from family and caregiver and this person will be involved with the treatment. The patient has not been in an event described as horrible or outside the realm of ordinary life experience either currently or in the past.  The patient has not been a victim of sexual/physical abuse. Section VII - Other Areas of Clinical Concern  The highest grade achieved is NA with the overall quality of school experience being described as NA. The patient is currently unemployed and speaks [place] as a primary language. The patient has no communication impairments affecting communication. The patient's preference for learning can be described as: can read and write adequately. The patient's hearing is normal.  The patient's vision is impaired and  wears glasses or contacts.       Kinza Sales, LPC

## 2017-08-01 NOTE — IP AVS SNAPSHOT
[address] 
[phone] Patient: Trent Salazar MRN: [id number] CFM:[birth year] You are allergic to the following Allergen Reactions Erythromycin Nausea Only Pcn (Penicillins) Unknown (comments) Recent Documentation Height Weight BMI Smoking Status 1.867 m 100.7 kg 28.89 kg/m2 Former Smoker Unresulted Labs Order Current Status CULTURE, BLOOD, PAIRED Preliminary result CULTURE, BLOOD, PAIRED Preliminary result Emergency Contacts Name Discharge Info Relation Home Work Mobile Radha Rios  Spouse [3] [phone] Kayleigh Rodriguez  Child [2] [phone] About your hospitalization You were admitted on:  August 1, 2017 You last received care in the:  [address] [facility] SUR[facility] You were discharged on:  August 4, 2017 Unit phone number:  [phone] Why you were hospitalized Your primary diagnosis was: Altered Mental Status Providers Seen During Your Hospitalizations Provider Role Specialty Primary office phone Gill Campoverde MD Attending Provider Emergency Medicine [phone] Shelton Muller MD Attending Provider Hospitalist [phone] Agusto Pace MD Attending Provider Internal Medicine [phone] Your Primary Care Physician (PCP) Primary Care Physician Office Phone Office Fax [place] [facility] [phone] Follow-up Information Follow up With Details Comments Contact Info Nieves Khalil MD On 8/10/2017 Hospital follow up PCP appointment on Thursday, 8/10/17 @ 10:00 a.m. [facility] 
[address] [employer] [address] [place] [address] 
[phone] Your Appointments Thursday August 10, 2017 10:00 AM EDT Office Visit with Nieves Khalil MD  
[place] [phone] Current Discharge Medication List  
  
START taking these medications Dose & Instructions Dispensing Information Comments Morning Noon Evening Bedtime  
 cyanocobalamin 1,000 mcg tablet Your last dose was:  TODAY Your next dose is:  TOMORROW Dose:  1000 mcg Take 1 Tab by mouth daily. Indications: PREVENTION OF VITAMIN B12 DEFICIENCY Quantity:  60 Tab Refills:  2 CONTINUE these medications which have NOT CHANGED Dose & Instructions Dispensing Information Comments Morning Noon Evening Bedtime  
 acetaminophen 325 mg tablet Commonly known as:  TYLENOL Notes to Patient:  AS NEEDED FOR PAIN Dose:  650 mg Take 650 mg by mouth two (2) times daily as needed for Pain. Refills:  0  
     
   
   
   
  
 alfuzosin SR 10 mg SR tablet Commonly known as:  Zoë Herb Dose:  10 mg Take 10 mg by mouth every evening. Refills:  0  
     
   
   
  
   
  
 aspirin delayed-release 81 mg tablet Your last dose was:  TODAY Your next dose is:  TOMORROW Dose:  81 mg Take 81 mg by mouth daily. Refills:  0  
     
  
   
   
   
  
 cholecalciferol 1,000 unit tablet Commonly known as:  VITAMIN D3 Your last dose was:  TODAY Your next dose is:  TOMORROW Dose:  2000 Units Take 2,000 Units by mouth daily. Refills:  0  
     
  
   
   
   
  
 docusate sodium 100 mg capsule Commonly known as:  Antonina Brink Your last dose was:  9 AM  
Your next dose is:  EVENING Dose:  100 mg Take 100 mg by mouth two (2) times a day. Refills:  0  
     
  
   
   
  
   
  
 escitalopram oxalate 10 mg tablet Commonly known as:  Maria Dolores Waters Your last dose was:  TODAY Your next dose is:  TOMORROW Dose:  10 mg Take 10 mg by mouth every morning. Refills:  0  
     
  
   
   
   
  
 metoprolol succinate 25 mg XL tablet Commonly known as:  TOPROL-XL  
 Your last dose was:  TODAY Your next dose is:  TOMORROW Dose:  25 mg Take 25 mg by mouth daily. Refills:  0  
     
  
   
   
   
  
 psyllium packet Commonly known as:  METAMUCIL Dose:  1 Packet Take 1 Packet by mouth daily. Refills:  0 Where to Get Your Medications Information on where to get these meds will be given to you by the nurse or doctor. ! Ask your nurse or doctor about these medications  
  cyanocobalamin 1,000 mcg tablet Discharge Instructions None Discharge Orders None ACO Transitions of Care Introducing [facility] [employer] offers a voluntary care coordination program to provide high quality service and care to [facility] fee-for-service beneficiaries. [facility] was designed to help you enhance your health and well-being through the following services: ? Transitions of Care  support for individuals who are transitioning from one care setting to another (example: Hospital to home). ? Chronic and Complex Care Coordination  support for individuals and caregivers of those with serious or chronic illnesses or with more than one chronic (ongoing) condition and those who take a number of different medications. If you meet specific medical criteria, a [facility] [address] may call you directly to coordinate your care with your primary care physician and your other care providers. For questions about the [facility] programs, please, contact your physicians office. For general questions or additional information about Accountable Care Organizations: 
Please visit www.medicare.gov/acos. html or call 1-800-MEDICARE (0-127.109.1548) TTY users should call 0-742.788.1596. Clif Announcement  We are excited to announce that we are making your provider's discharge notes available to you in Every1Mobilet. You will see these notes when they are completed and signed by the physician that discharged you from your recent hospital stay. If you have any questions or concerns about any information you see in [employer], please call the Health Information Department where you were seen or reach out to your Primary Care Provider for more information about your plan of care. Introducing [facility]! Dear Liat Duarte: Thank you for requesting a Quintiq account. Our records indicate that you have previously registered for a Quintiq account but its currently inactive. Please call our Quintiq support line at [phone]. Additional Information If you have questions, please visit the Frequently Asked Questions section of the Quintiq website at [email or web address] Remember, Quintiq is NOT to be used for urgent needs. For medical emergencies, dial 911. Now available from your iPhone and Android! General Information Please provide this summary of care documentation to your next provider. Patient Signature:  ____________________________________________________________ Date:  ____________________________________________________________  
  
Jesi Ventura Provider Signature:  ____________________________________________________________ Date:  ____________________________________________________________

## 2017-08-01 NOTE — ED TRIAGE NOTES
TRIAGE:  Per family, he's had a change in cognition since yesterday afternoon. His mobility has decreased; denies n/v, diarrhea, abdominal pain, or urinary issues. He has not been able sleep x 2 days. He has a hx of dementia.

## 2017-08-01 NOTE — PROGRESS NOTES
Was consulted on the patient   Pt appears to be depressed and with progressive dementia, awaiting BSMART assessment

## 2017-08-01 NOTE — PROGRESS NOTES
Admission Medication Reconciliation:    Information obtained from: Patient's caregiver and Rx Query    Significant PMH/Disease States:   Past Medical History:   Diagnosis Date    Adhesive capsulitis of shoulder      Edgardo Mckeon notes-injected 3/17/16    Advance directive in chart 12/31/14    SEE SCANNED Form    Arrhythmia     [facility] [address]     B[facility]     [facility] Ray    Cancer [facility]) 10/2010    basal cell dr Chayito Dumont [facility]) 8/2012    squamous cell    Constipation     Contact dermatitis and other eczema, due to unspecified cause     ak    Dementia     7/27/15 copy of neuropsych eval    Diverticular disease     dr Gerson Stein DJD of shoulder     dr Boni Wahl  7/22/15 notes    [facility]    F[facility] [id number]    [facility]    Gallbladder calculus without cholecystitis 4/12/2016    GERD (gastroesophageal reflux disease)     reflux dr Rock Ceja    Headache     migraines    Hearing loss     [facility] fracture, right ([facility] [address]     Hypertension     Insomnia     Left knee pain 5/27/14 7/28/16    patellofemoral arthrosis of right knee  1/26/17 f/u    Left shoulder pain 3/31/14  1/7/16    [facility] 5/5[facility]    Nosebleed     6/8/17 note from ENT Risih Douglass May    OAB (overactive bladder)     [facility]    Osteopenia 3/2007    PAC (premature atrial contraction)     Pacemaker     7/29/15 note about pacer check    Panic attacks     PAT (paroxysmal atrial tachycardia) (HCC)     Peripheral neuropathy (HCC)     PVC (premature ventricular contraction)     S/P laparoscopic cholecystectomy 4/25/2016    Shingles 9/2010    dr Brenner [facility] Tinnitus     Varicocele     dr Massimo Tolentino       Chief Complaint for this Admission:    Chief Complaint   Patient presents with    Altered mental status    Chills         Allergies:  Erythromycin and Pcn [penicillins]    Prior to Admission Medications: Prior to Admission Medications   Prescriptions Last Dose Informant Patient Reported? Taking?   acetaminophen (TYLENOL) 325 mg tablet 8/1/2017 at Unknown time  Yes Yes   Sig: Take 650 mg by mouth two (2) times daily as needed for Pain. alfuzosin SR (UROXATRAL) 10 mg SR tablet 7/31/2017 at Unknown time  Yes Yes   Sig: Take 10 mg by mouth every evening. aspirin delayed-release 81 mg tablet 8/1/2017 at Unknown time  Yes Yes   Sig: Take 81 mg by mouth daily. cholecalciferol (VITAMIN D3) 1,000 unit tablet 7/31/2017 at Unknown time  Yes Yes   Sig: Take 2,000 Units by mouth daily. docusate sodium (COLACE) 100 mg capsule 8/1/2017 at Unknown time  Yes Yes   Sig: Take 100 mg by mouth two (2) times a day. escitalopram oxalate (LEXAPRO) 10 mg tablet 8/1/2017 at Unknown time  Yes Yes   Sig: Take 10 mg by mouth every morning. metoprolol succinate (TOPROL-XL) 25 mg XL tablet 8/1/2017 at Unknown time  Yes Yes   Sig: Take 25 mg by mouth daily. psyllium (METAMUCIL) packet 8/1/2017 at Unknown time  Yes Yes   Sig: Take 1 Packet by mouth daily. Facility-Administered Medications: None         Comments/Recommendations: Medication history was obtained by patient's caregiver and Rx Query. Removed artificial tears, trolamine salicylate cream, and polyethylene glycol powder from medication list as patient no longer taking. Of note, patient did take metoprolol succinate 25mg and escitalopram 10mg this morning, and he took alfuzosin SR 10mg yesterday evening.

## 2017-08-01 NOTE — ROUTINE PROCESS
TRANSFER - OUT REPORT:    Verbal report given to Sharifa Logan RN (name) on Sayda Acuña  being transferred to  201 (unit) for routine progression of care       Report consisted of patients Situation, Background, Assessment and   Recommendations(SBAR). Information from the following report(s) SBAR, ED Summary, [facility] and Recent Results was reviewed with the receiving nurse. Lines:   Peripheral IV 04/14/16 Left Hand (Active)       Peripheral IV 08/01/17 Right Wrist (Active)   Site Assessment Clean, dry, & intact 8/1/2017 11:46 AM   Phlebitis Assessment 0 8/1/2017 11:46 AM   Infiltration Assessment 0 8/1/2017 11:46 AM   Dressing Status Clean, dry, & intact; Occlusive 8/1/2017 11:46 AM   Dressing Type 4 X 4;Tape;Transparent 8/1/2017 11:46 AM   Hub Color/Line Status Pink;Flushed;Patent 8/1/2017 11:46 AM   Action Taken Blood drawn 8/1/2017 11:46 AM       Peripheral IV 08/01/17 Left Hand (Active)   Site Assessment Clean, dry, & intact 8/1/2017 11:51 AM   Phlebitis Assessment 0 8/1/2017 11:51 AM   Infiltration Assessment 0 8/1/2017 11:51 AM   Dressing Status Clean, dry, & intact 8/1/2017 11:51 AM   Dressing Type Transparent 8/1/2017 11:51 AM   Hub Color/Line Status Blue 8/1/2017 11:51 AM        Opportunity for questions and clarification was provided.       Patient transported with:   [employer]

## 2017-08-01 NOTE — ED PROVIDER NOTES
HPI Comments: 80 y.o. male with past medical history significant for BPH, HTN, atrial fib, diverticular disease, GERD, DJD of shoulder, PAT, PAC, PVC, dementia, appendectomy, heart catheterization, pacemaker, and lap cholecystectomy  who presents via EMS with chief complaint of AMS. Per caregiver, approximately 20 hours ago she noticed the pt was unable to get up on his own and ambulate to the bathroom and appeared to have balance issues. The pt complained that his legs and arms hurt, which is typical, but caregiver states he's normally able to get around on his own. Pt's wife also reports that the pt doesn't seem to be focusing, and that his R eye is droopy/closed more than usual. Caregiver also reports that the pt seems more confused today than normal, and that he is having trouble with normal tasks(like swallowing pills). Caregiver states the pt has slept very little the past 2 days but has been eating normally. The pt does claim that his back hurt yesterday. Pt denies any HA, abd pain, cough, rhinorrhea, vomiting, diarrhea, dysuria, or frequency. No chest pain or shortness of breath. Pt did take his medication this morning, including metoprolol and lexapro. There are no other acute medical concerns at this time. Full history, physical exam, and ROS unable to be obtained due change from normal mental status    PCP: Constance Neff MD    Note written by Scott Herzog, as dictated by KATRINA Calderón 11:33 AM        The history is provided by a caregiver, the patient and the spouse.         Past Medical History:   Diagnosis Date    Adhesive capsulitis of shoulder      Emmit Spatz notes-injected 3/17/16    Advance directive in chart 12/31/14    SEE SCANNED Form    Arrhythmia     Atrial fibrillation ([address]     B[facility]     [facility] Wh[facility]    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    squamous cell    Constipation     Contact dermatitis and other eczema, due to unspecified cause ak    Dementia     7/27/15 copy of neuropsych eval    Diverticular disease     dr Krishna Pulido DJD of shoulder     dr Carissa Slater  7/22/15 notes    [facility]ile [facility]    [facility] nail [id number]    [facility]    Gallbladder calculus without cholecystitis 4/12/2016    GERD (gastroesophageal reflux disease)     reflux dr Susie Pyle mann    Headache     migraines    Hearing loss     Hip fracture, right ([address]     Hypertension     Insomnia     Left knee pain 5/27/14 7/28/16    patellofemoral arthrosis of right knee  1/26/17 f/u    Left shoulder pain 3/31/14  1/7/16    [facility] 5/5[facility]    Nosebleed     6/8/17 note from ENT Phill Cover May    OAB ([facility]active bladder)     [facility]    Osteopenia 3/2007    PAC (premature atrial contraction)     Pacemaker     7/29/15 note about pacer check    Panic attacks     PAT (paroxysmal atrial tachycardia) (HCC)     Peripheral neuropathy (HCC)     PVC (premature ventricular contraction)     S/P laparoscopic cholecystectomy 4/25/2016    Shingles 9/2010    dr Say La Tinnitus     Varicocele     dr Saturnino Landaverde       Past Surgical History:   Procedure Laterality Date    EGD      HX APPENDECTOMY  11/2008    dr ashlee Be[facility]      colonoscopy    HX HEART CATHETERIZATION  4/6/2012    mild to moderate CAD in LAD and OM, RCA, less than 50% lesions    HX HEENT      cataract left    HX LAP CHOLECYSTECTOMY  4/13/16    HX ORTHOPAEDIC      rt femur fx.     HX PACEMAKER      INS PPM/ICD LED DUAL ONLY  4/26/2012         IR INSERT PACEMAKER FLUORO GUIDE  4/26/2012         [place] COLONOSCOPY FLX DX W/COLLJ SPEC WHEN PFRMD  1/27/2012         [place] EGD TRANSORAL BIOPSY SINGLE/MULTIPLE  1/27/2012              Family History:   Problem Relation Age of Onset    Stroke Father     Dementia Father     Heart Disease Mother     Headache Daughter     Other Sister fibromyalgia    Arthritis-osteo Brother     Other Brother      stomach       Social History     Social History    Marital status:      Spouse name: N/A    Number of children: N/A    Years of education: N/A     Occupational History    Not on file. Social History Main Topics    Smoking status: Former Smoker     Packs/day: 1.00     Years: 30.00     Types: Cigarettes     Quit date: 1/1/1982    Smokeless tobacco: Never Used    Alcohol use No    Drug use: No    Sexual activity: Yes     Partners: Female     Other Topics Concern    Not on file     Social History Narrative         ALLERGIES: Erythromycin and Pcn [penicillins]    Review of Systems   Constitutional: Negative for appetite change. HENT: Positive for sore throat. Negative for congestion and rhinorrhea. Respiratory: Negative for cough, chest tightness and shortness of breath. Cardiovascular: Negative for chest pain. Gastrointestinal: Negative for abdominal pain, diarrhea and vomiting. Genitourinary: Negative for dysuria and frequency. Musculoskeletal: Positive for gait problem (unable to ambulate on own yesterday and today). Negative for back pain and neck pain. Skin: Negative for color change and rash. Neurological: Positive for facial asymmetry (R eye droop). Negative for headaches. Psychiatric/Behavioral: Positive for confusion. Vitals:    08/01/17 1107   BP: 130/69   Pulse: 83   Resp: 28   Temp: 100.2 °F (37.9 °C)   SpO2: 92%   Weight: 100.7 kg (222 lb)   Height: 6' 1.5\" (1.867 m)            Physical Exam   Constitutional: He appears well-developed and well-nourished. No distress. HENT:   Head: Normocephalic and atraumatic. Eyes: Conjunctivae and EOM are normal. Pupils are equal, round, and reactive to light. Neck: Normal range of motion. Neck supple. Cardiovascular: Normal rate and regular rhythm. Pulmonary/Chest: Effort normal and breath sounds normal. No respiratory distress. Abdominal: Soft. Bowel sounds are normal. He exhibits no distension and no mass. There is no tenderness. There is no rebound and no guarding. Abdomen exposed for exam.  Soft, no peritoneal signs   Musculoskeletal: Normal range of motion. Neurological: He is alert. He exhibits normal muscle tone. Coordination normal.   Unable to identify wife, birthday, wedding month, president    Follows commands appropriately   Skin: Skin is warm and dry. No rash noted. No erythema. Psychiatric: He has a normal mood and affect. His behavior is normal.   Nursing note and vitals reviewed. MDM  Number of Diagnoses or Management Options  Altered mental status, unspecified altered mental status type:   Fever, unspecified fever cause:   Diagnosis management comments: 79 yo male presenting to ER for alteration in mental status. Onset yesterday at 3:30 pm.  Worse today. Pt confused but follows commands. Pt with low grade temperature. Abdomen soft, lungs clear. P: xray, ua, ekg, labs. 1:26 PM  Patient is being admitted to the hospital for further evaluation of fever and altered mental status. The results of their tests and reasons for their admission have been discussed with them and/or available family. They convey agreement and understanding for the need to be admitted and for their admission diagnosis. Consultation has been made with the inpatient physician, Jorge Levi for hospitalization. ED Course       Procedures    Pt case including HPI, PE, and all available lab and radiology results has been discussed with attending physician. Opportunity to evaluate patient has been provided to ER attending.

## 2017-08-02 NOTE — PROGRESS NOTES
Bedside shift change report given to Nick Lux (oncoming nurse) by Alem Brock RN (offgoing nurse). Report included the following information SBAR and Kardex.

## 2017-08-02 NOTE — PROGRESS NOTES
Physical Therapy  8.2.17    Order received, chart reviewed. Patient sleeping at this time. Spoke with wife who requested PT allow patient to rest and f/u after lunch. F/u as able/appropriate for PT evaluation. Thank you.     Avon Simmonds, PT, DPT

## 2017-08-02 NOTE — PROGRESS NOTES
Problem: Mobility Impaired (Adult and Pediatric)  Goal: *Acute Goals and Plan of Care (Insert Text)  Physical Therapy Goals  Initiated 8/2/2017  1. Patient will move from supine to sit and sit to supine , scoot up and down and roll side to side in bed with supervision/set-up within 7 day(s). 2. Patient will transfer from bed to chair and chair to bed with moderate assistance using the least restrictive device within 7 day(s). 3. Patient will perform sit to stand with minimal assistance within 7 day(s). 4. Patient will ambulate with moderate assistance for 15 feet with the least restrictive device within 7 day(s). 5. Patient will improve Tinetti score by 4-5 points within 7 days. PHYSICAL THERAPY EVALUATION  Patient: Kendra Orourke (53 y.o. male)  Date: 8/2/2017  Primary Diagnosis: Altered mental status  Altered mental status        Precautions:   Fall, Bed Alarm (sitter; dementia)      ASSESSMENT :  Based on the objective data described below, the patient presents with increased confusion and hx of dementia, poor command following, impulsivity, generalized weakness, impaired balance, and overall decline from baseline functional mobility. Patient received sitting up in bed with wife, caregiver, and sitter at bedside. Patient only oriented to self and with tangential speech throughout session. Overall mod-max Ax1-2 for bed mobility and transfers. Difficulty following commands for strength assessment. Upon standing, patient impulsive, reaching for IV pole and second bed in double room. Max A to steady in standing and patient then stating he needed to go to the bathroom for BM. Required max A and encouragement to return to sitting EOB, with eventual need for standing and stepping towards HOB due to patient being unable to follow commands or coordinating scooting along EOB. Max Ax1-2 for rolling in bed for brief change and hygiene due to rigidity. Recommend d/c to SNF, although patient is observation.  D/c home is unsafe unless patient has w/c and assist x2 persons for 24 hrs/day. Spoke with caregiver and wife, then daughter on the phone about d/c recommendations. May benefit from eventual transition into higher level of care. Patient will benefit from OT consult. Recommend urinal and bed pan use, as patient unsafe to transfer with nursing at this time. Patient will benefit from skilled intervention to address the above impairments. Patients rehabilitation potential is considered to be Fair  Factors which may influence rehabilitation potential include:   [ ]         None noted  [X]         Mental ability/status- dementia  [ ]         Medical condition  [ ]         Home/family situation and support systems  [ ]         Safety awareness  [ ]         Pain tolerance/management  [ ]         Other:        PLAN :  Recommendations and Planned Interventions:  [X]           Bed Mobility Training             [X]    Neuromuscular Re-Education  [X]           Transfer Training                   [ ]    Orthotic/Prosthetic Training  [X]           Gait Training                         [ ]    Modalities  [X]           Therapeutic Exercises           [ ]    Edema Management/Control  [X]           Therapeutic Activities            [X]    Patient and Family Training/Education  [ ]           Other (comment):     Frequency/Duration: Patient will be followed by physical therapy  5 times a week to address goals. Discharge Recommendations: [facility] vs LTC, vs home with HHPT and additional support  Further Equipment Recommendations for Discharge: w/c, BSC, potential RW and hospital bed if d/c home        SUBJECTIVE:   Patient stated 46[address].  when asked what year it was       OBJECTIVE DATA SUMMARY:   HISTORY:    Past Medical History:   Diagnosis Date    Adhesive capsulitis of shoulder        [facility]an notes-injected 3/17/16    Advance directive in chart 12/31/14     SEE SCANNED Form    Arrhythmia      Atrial fibrillation ([facility]      BPH       [facility] Curtis Valverde    [facility]) 10/2010     basal cell dr Valdez Burnette [facility]) 8/2012     squamous cell    Constipation      Contact dermatitis and other eczema, due to unspecified cause       ak    Dementia       7/27/15 copy of neuropsych eval    Diverticular disease       dr Max Castellano DJD of shoulder       dr Lucy Salmon  7/22/15 notes    ED [facility]erectile [facility])       [facility]    [facility] nail [id number]     [facility]    Gallbladder calculus without cholecystitis 4/12/2016    GERD (gastroesophageal reflux disease)       reflux dr Cachorro Melgoza    Headache       migraines    Hearing loss      Hip fracture, right ([address]      Hypertension      Insomnia      Left knee pain 5/27/14 7/28/16     patellofemoral arthrosis of right knee  1/26/17 f/u    Left shoulder pain 3/31/14  1/7/16     [facility] 5/5/17    Nosebleed       6/8/17 note from ENT David Hinkle    OAB (overactive bladder)       [facility]    Osteopenia 3/2007    PAC (premature atrial contraction)      Pacemaker       7/29/15 note about pacer check    Panic attacks      PAT (paroxysmal atrial tachycardia) (HCC)      Peripheral neuropathy ([facility] [address] [facility]      PVC (premature ventricular contraction)      S/P laparoscopic cholecystectomy 4/25/2016    Shingles 9/2010     dr Florencio Miranda   [address] [place] [address] Tinnitus      Varicocele       dr Zayda Mcdowell     Past Surgical History:   Procedure Laterality Date    EGD        HX APPENDECTOMY   11/2008     dr ashlee [facility]         colonoscopy    HX HEART CATHETERIZATION   4/6/2012     mild to moderate CAD in LAD and OM, RCA, less than 50% lesions    HX HEENT         cataract left    HX LAP CHOLECYSTECTOMY   4/13/16    HX ORTHOPAEDIC         rt femur fx.     HX PACEMAKER        INS PPM/ICD LED DUAL ONLY   4/26/2012          IR INSERT PACEMAKER FLUORO GUIDE   4/26/2012          [place] COLONOSCOPY FLX DX W/COLLJ SPEC WHEN PFRMD   1/27/2012          [place] EGD TRANSORAL BIOPSY SINGLE/MULTIPLE   1/27/2012           Prior Level of Function/Home Situation: Patient lives with wife in apartment and has caregiver from 9-5 daily. Was ambulating with SPC prior to admission. Mod I for all mobility and toileting per caregiver. Was a  for a living. Plays/played (?)  the piano. Personal factors and/or comorbidities impacting plan of care: PMH, dementia     Home Situation  Home Environment: Private residence  One/Two Story Residence: One story  Living Alone: No  Support Systems: Child(travis), Spouse/Significant Other/Partner, Home care staff (caregiver 9-5 everyday)  Patient Expects to be Discharged to[de-identified] Private residence  Current DME Used/Available at Home: Cane, straight     EXAMINATION/PRESENTATION/DECISION MAKING:   Critical Behavior:  Neurologic State: Alert, Confused  Orientation Level: Oriented to person, Oriented to time, Disoriented to situation, Disoriented to place  Cognition: Decreased attention/concentration, Memory loss, Poor safety awareness  Range Of Motion:  AROM: Generally decreased, functional  Strength:    Strength: Generally decreased, functional  Tone & Sensation:   Tone: Abnormal (patient overall very stiff in bed)  Coordination:  Coordination: Generally decreased, functional  Functional Mobility:  Bed Mobility:  Supine to Sit: Moderate assistance;Assist x1  Sit to Supine: Moderate assistance;Maximum assistance;Assist x2  Transfers:  Sit to Stand: Moderate assistance;Maximum assistance (max Ax1 to stabilize upon standing)  Stand to Sit: Maximum assistance;Assist x1  Balance:   Sitting: Intact; Without support  Standing: Impaired; With support  Standing - Static: Poor  Standing - Dynamic : Poor     Functional Measure:  Tinetti test:      Sitting Balance: 1  Arises: 0  Attempts to Rise: 0  Immediate Standing Balance: 0  Standing Balance: 0  Nudged: 0  Eyes Closed: 0  Turn 360 Degrees - Continuous/Discontinuous: 0  Turn 360 Degrees - Steady/Unsteady: 0  Sitting Down: 0  Balance Score: 1  Indication of Gait: 0  R Step Length/Height: 0  L Step Length/Height: 0  R Foot Clearance: 0  L Foot Clearance: 0  Step Symmetry: 0  Step Continuity: 0  Path: 0  Trunk: 0  Walking Time: 0  Gait Score: 0  Total Score: 1         Tinetti Test and G-code impairment scale:  Percentage of Impairment CH     0%    CI     1-19% CJ     20-39% CK     40-59% CL     60-79% CM     80-99% CN      100%   Tinetti  Score 0-28 28 23-27 17-22 12-16 6-11 1-5 0          Tinetti Tool Score Risk of Falls  <19 = High Fall Risk  19-24 = Moderate Fall Risk  25-28 = Low Fall Risk  Tinetti ME. Performance-Oriented Assessment of Mobility Problems in Elderly Patients. [address]; [id number]. (Scoring Description: PT Bulletin Feb. 10, 1993)     Older adults: Gentry Woodall et al, 2009; n = 1000 [facility] elderly evaluated with ABC, CHANTEL, ADL, and IADL)  · Mean CHANTEL score for males aged 69-68 years = 26.21(3.40)  · Mean CHANTEL score for females age 69-68 years = 25.16(4.30)  · Mean CHANTEL score for males over 80 years = 23.29(6.02)  · Mean CHANTEL score for females over 80 years = 17.20(8.32)            G codes: In compliance with CMSs Claims Based Outcome Reporting, the following G-code set was chosen for this patient based on their primary functional limitation being treated: The outcome measure chosen to determine the severity of the functional limitation was the Tinetti with a score of 1/28 which was correlated with the impairment scale.       · Mobility - Walking and Moving Around:               - CURRENT STATUS:    CM - 80%-99% impaired, limited or restricted               - GOAL STATUS:           CL - 60%-79% impaired, limited or restricted               - D/C STATUS:                       ---------------To be determined---------------      Physical Therapy Evaluation Charge Determination   History Examination Presentation Decision-Making   HIGH Complexity :3+ comorbidities / personal factors will impact the outcome/ POC  MEDIUM Complexity : 3 Standardized tests and measures addressing body structure, function, activity limitation and / or participation in recreation  LOW Complexity : Stable, uncomplicated  Other outcome measures 1/28  HIGH       Based on the above components, the patient evaluation is determined to be of the following complexity level: LOW      Pain:  Pain Scale 1: Numeric (0 - 10)  Pain Intensity 1: 0  Activity Tolerance:   Fair- limited by confusion  Please refer to the flowsheet for vital signs taken during this treatment. After treatment:   [ ]         Patient left in no apparent distress sitting up in chair  [X]         Patient left in no apparent distress in bed  [X]         Call bell left within reach  [X]         Nursing notified  [X]         Caregiver present and sitter  [X]         Bed alarm activated      COMMUNICATION/EDUCATION:   The patients plan of care was discussed with: Registered Nurse and . [X]         Fall prevention education was provided and the patient/caregiver indicated understanding. [X]         Patient/family have participated as able in goal setting and plan of care. [X]         Patient/family agree to work toward stated goals and plan of care. [ ]         Patient understands intent and goals of therapy, but is neutral about his/her participation. [ ]         Patient is unable to participate in goal setting and plan of care.      Thank you for this referral.  Alysia Estrada, PT, DPT   Time Calculation: 32 mins

## 2017-08-02 NOTE — PROGRESS NOTES
Bedside and Verbal shift change report given to [facility] NAZARIO RN (oncoming nurse) by Rosalva Mancia RN (offgoing nurse). Report included the following information SBAR, Kardex, Intake/Output and Med Rec Status.

## 2017-08-02 NOTE — PROGRESS NOTES
Reviewed chart. Conferred with PT post evaluation. She met with pt's wife and caregiver along with speaking with daughter Celia Economy: [phone]). Per daughter's request, I contacted her re: explanation of Observation status vs inpt stay. Long discussion with daughter this pm. Pt does not meet inpt criteria at this point. Concerns expressed re: care needs. Son and daughter have been talking about need for higher level of care even before admission. He has long term health insurance. Discussed possibility of ICF placement with Part B paying for therapy. She is aware of nursing home wanting payment and reimbursement to be received by family. She questioned [facility]. Explained they do have the different levels of care which can accommodate both parents but it may take time for this to be coordinated. Another option is to increase private care givers in the home to 24 hour coverage (12 hour shifts) in hopes of pt's mentation being better in familiar surroundings. This would give them time to relocate couple in chosen facility without rushing a decision. She plans to talk with her brother Natasha Vega @ [phone]) in hopes of him travelling to [place] tomorrow for assistance in disposition. He oversees financial needs of this couple. Emotional support given. Son may call me this evening at which time above info will be discussed. Manny Brown LCSW    Care Management Interventions  PCP Verified by CM: Yes (Dr. Stevenson Flower)  Mode of Transport at Discharge: BLS  MyChart Signup: No  Discharge Durable Medical Equipment: No  Physical Therapy Consult: Yes  Occupational Therapy Consult: No  Speech Therapy Consult: No  Current Support Network: Lives with Spouse, Has Personal Caregivers  Confirm Follow Up Transport: Family  Plan discussed with Pt/Family/Caregiver: Yes  Freedom of Choice Offered:  Yes

## 2017-08-02 NOTE — PROGRESS NOTES
[facility] [place]: Yessy Hunter  [phone]    HPI: Len Palomares is an [de-identified]y.o. year-old who presents for follow up regarding his PACs, PVCs, non-obstructive CAD and aortic stenosis. Admitted for ams, he is confused but appropriate then will try to get out of bed when he forgets where he is. No active cardiac symptoms, denies chest pain, dyspnea, edema, etc. BP stable and last pacer check OK. No recommendation for further cardiac testing. Available to see as needed. He feels short of breath with walking any distance, but not doing much these days. Seems to be deconditioned. Bp is stable, not checking it at home. Memory is troubling him. He is feeling tired all the time. He walks slow and does not get out of breath. He gets dizzy at times but it seems more like vertigo. He continues having trouble with his memory and that is frustrating. No chest pain, dyspnea, edema, palpitations. He is walking daily. The balance is doing ok, no falls since he started using his cane. Energy is up and down. Goes back to bed after breakfast. Up a lot at night to urinate. He is walking daily and reading more. But his memory seems to be getting worse and that is hard on him and his wife. BP is borderline low for age. Continue metoprolol due to aflutter. 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 They met at [facility]. He is a poor historian and his wife is here today with him    Having more trouble with his memory and confusion. No recent hospital admissions. He will see Dr. Rl Naik soon and wants to ask him about the results from Dr. Bibi Jean Baptiste. EKG NSR with PAC, atrial bigemeny  Pacer check today    A/P  1. HTN - well controlled on metoprolol  2. Dyslipidemia- cont lipitor at 20mg daily  4. Reflux- on prilosec   5. Afib- paroxysmal, in NSR today, on aspirin, CHADS2VASC=3 (age and HTN) but patient is not interested in taking a more potent anticoagulant, and would be high risk due to dementia, fall risk  6.  CAD - on ASA, statin, metoprolol, nonobstructive by cath  7. Fatigue- improved after pacemaker placement, aggravated by RLS   8. Bradycardia - s/p Pacer implant 4/12  9. Aortic stenosis - mild by TTE Today  10. NSVT on last pacer check, no symptoms, no signs of cad progression    8/15 EF 55%, ao sclerosis  12/14 EF 60, triv TR, Aov not well seen  7/13 Echo - LVEF 55-60%, mild MR, mild AS (mean gradient 17mmhg, AWA 1.3cm2), mild cLVH  5/13 EF 60% mild TR mild MR  Cath 4/12 LAD 40% ostial, RCA 40% prox, PDA 50%  3/12 EF 60, mild cLVh, mild As, mild TR, mild MR   3/12 Holter:  7 beat VT, pauses<2 seconds, PAC, PVC  Stress 3/12: afib with EKG changes, upsloping without perfusion defect    He  has a past medical history of Adhesive capsulitis of shoulder; Advance directive in chart (12/31/14); Arrhythmia; Atrial fibrillation ([address] BPH; Cancer ([address] (10/2010); Cancer ([facility] (8/2012); Constipation; Contact dermatitis and other eczema, due to unspecified cause; Dementia; Diverticular disease; DJD of shoulder; ED (erectile dysfunction); Fungal infection of nail ([id number]); Gallbladder calculus without cholecystitis (4/12/2016); GERD (gastroesophageal reflux disease); Headache; Hearing loss; Hip fracture, right ([address] Hypertension; Insomnia; Left knee pain (5/27/14 7/28/16); Left shoulder pain (3/31/14  1/7/16); Nosebleed; OAB (overactive bladder); Osteopenia (3/2007); PAC (premature atrial contraction); Pacemaker; Panic attacks; PAT (paroxysmal atrial tachycardia) ([address] Peripheral neuropathy ([address] PVC (premature ventricular contraction); S/P laparoscopic cholecystectomy (4/25/2016); Shingles (9/2010); Tinnitus; and Varicocele. Cardiovascular ROS: no chest pain or dyspnea on exertion  Respiratory ROS: no cough, shortness of breath, or wheezing  Neurological ROS: no TIA or stroke symptoms  All other systems negative except as above.      PE  Vitals:    08/01/17 1345 08/01/17 1512 08/01/17 1559 08/01/17 2100   BP: 157/85 161/85 141/83 151/84   Pulse: 81 78 79 85   Resp: 26 27 20 21   Temp: 98.7 °F (37.1 °C) 99.3 °F (37.4 °C) 99 °F (37.2 °C) (!) 100.9 °F (38.3 °C)   SpO2: 94% 97% 94% 92%   Weight:       Height:        Body mass index is 28.89 kg/(m^2).   General appearance - alert, well appearing, and in no distress  Mental status - affect appropriate to mood  Eyes - sclera anicteric, moist mucous membranes  Neck - supple, no significant adenopathy  Lymphatics - not assessed   Chest - clear to auscultation, no wheezes, rales or rhonchi  Heart - normal rate, regular rhythm, normal S1, S2, 2/6 WENDY   Abdomen - soft, nontender, nondistended, no masses or organomegaly  Back exam - full range of motion, no tenderness  Neurological - cranial nerves II through XII grossly intact, no focal deficit  Musculoskeletal - no muscular tenderness noted, normal strength  Extremities - peripheral pulses normal, no pedal edema  Skin - normal coloration  no rashes    Recent Labs:  Lab Results   Component Value Date/Time    Cholesterol, total 177 08/01/2017 11:35 AM    HDL Cholesterol 49 08/01/2017 11:35 AM    LDL, calculated 110 08/01/2017 11:35 AM    Triglyceride 90 08/01/2017 11:35 AM    CHOL/HDL Ratio 3.6 08/01/2017 11:35 AM     Lab Results   Component Value Date/Time    Creatinine 0.95 08/01/2017 11:35 AM     Lab Results   Component Value Date/Time    BUN 13 08/01/2017 11:35 AM     Lab Results   Component Value Date/Time    Potassium 3.7 08/01/2017 11:35 AM     Lab Results   Component Value Date/Time    Hemoglobin A1c 5.7 06/05/2017 09:29 AM     Lab Results   Component Value Date/Time    HGB 13.0 08/01/2017 11:35 AM     Lab Results   Component Value Date/Time    PLATELET 667 34/22/5725 11:35 AM       Reviewed:  Past Medical History:   Diagnosis Date    Adhesive capsulitis of shoulder      [facility]on Neigh notes-injected 3/17/16    Advance directive in chart 12/31/14    SEE SCANNED Form    Arrhythmia     Atrial fibrillation (HCC)     BPH     [facility] Sharyn [facility] Cancer ([facility] 10/2010    basal cell dr Phelps Filter Cancer [facility]) 8/2012    squamous cell    Constipation     Contact dermatitis and other eczema, due to unspecified cause     ak    Dementia     7/27/15 copy of neuropsych eval    Diverticular disease     dr Max Spicer DJD of shoulder     dr Maggy Lord  7/22/15 notes    ED (erectile dysfunction)     [facility]    [facility] infection of nail [id number]    [facility]    Gallbladder calculus without cholecystitis 4/12/2016    GERD (gastroesophageal reflux disease)     reflux dr Jim mann    Headache     migraines    Hearing loss     Hip fracture, right ([facility]     Hypertension     Insomnia     Left knee pain 5/27/14 7/28/16    patellofemoral arthrosis of right knee  1/26/17 f/u    Left shoulder pain 3/31/14  1/7/16    [facility]D 5/5/17    Nosebleed     6/8/17 note from ENT Mandi Snowden May    OAB (overactive bladder)     [facility]    Osteopenia 3/2007    PAC (premature atrial contraction)     Pacemaker     7/29/15 note about pacer check    Panic attacks     PAT (paroxysmal atrial tachycardia) (HCC)     Peripheral neuropathy (HCC)     PVC (premature ventricular contraction)     S/P laparoscopic cholecystectomy 4/25/2016    Shingles 9/2010    dr Bertram An   [facility] Tinnitus     Varicocele     dr Harmon Listen     History   Smoking Status    Former Smoker    Packs/day: 1.00    Years: 30.00    Types: Cigarettes    Quit date: 1/1/1982   Smokeless Tobacco    Never Used     History   Alcohol Use No     Allergies   Allergen Reactions    Erythromycin Nausea Only    Pcn [Penicillins] Unknown (comments)       Current Facility-Administered Medications   Medication Dose Route Frequency    tamsulosin (FLOMAX) capsule 0.4 mg  0.4 mg Oral QHS    [START ON 8/2/2017] aspirin delayed-release tablet 81 mg  81 mg Oral DAILY    [START ON 8/2/2017] cholecalciferol (VITAMIN D3) tablet 2,000 Units  2,000 Units Oral DAILY    docusate sodium (COLACE) capsule 100 mg  100 mg Oral BID    [START ON 8/2/2017] escitalopram oxalate (LEXAPRO) tablet 10 mg  10 mg Oral DAILY    [START ON 8/2/2017] metoprolol succinate (TOPROL-XL) XL tablet 25 mg  25 mg Oral DAILY    sodium chloride (NS) flush 5-10 mL  5-10 mL IntraVENous Q8H    sodium chloride (NS) flush 5-10 mL  5-10 mL IntraVENous PRN    0.9% sodium chloride infusion  50 mL/hr IntraVENous CONTINUOUS    acetaminophen (TYLENOL) tablet 650 mg  650 mg Oral Q4H PRN    hydrALAZINE (APRESOLINE) 20 mg/mL injection 10 mg  10 mg IntraVENous Q6H PRN       MD Kim Felix [facility] heart and Vascular [place]  [facility], [address] [facility]  [address]

## 2017-08-03 NOTE — H&P
Hospitalist Progress Note  Eder Goncalves MD  Office: [phone]  Cell: [phone]      Date of Service:  2017  NAME:  Az Willard  :  1931  MRN:  [id number]      Admission Summary:     The patient is a 80year old  male with past medical history of BPH, hypertension, atrial fibrillation,  Diverticular disease, GERD, dementia who presented to the ED with complaint bilateral lower extremity weakness. Interval history / Subjective:      F/u for bilateral lower extremity weakness. No acute overnight events. Patient is a poor historian secondary to dementia. Assessment & Plan:     Bilateral lower extremity weakness/generalised body weakness: This is likely secondary to deconditioning. CT of the head shows central volume loss, progressive as compared to 2014. No acute ICH. PT recommending SNF Vs LTC vs home with HHPT. Patient's family request patient be discharged to a SNF as wife may not be able to care for patient at home. - Consult case management. Consult OT    Dementia; Appears progressive and advanced. He is calm and without any aggressive behavior. TSH 1.09. Check Vitamin b12  Continue supportive care    Atrial fibrillation: Paroxysmal. He is rate controlled and in SR. S/p pacemaker. Continue aspirin and metoprolol for rate control. Essential HTN: BP is stable  Continue metoprolol      Code status: Full  DVT prophylaxis: SCD    Care Plan discussed with: Patient/Family and Nurse  Disposition: TBD. To SNF VS Home with 24 hr care     Hospital Problems  Date Reviewed: 2017          Codes Class Noted POA    * (Principal)Altered mental status ICD-10-CM: R41.82  ICD-9-CM: 780.97  2017 Unknown                Review of Systems:   Pertinent items are noted in HPI. Vital Signs:    Last 24hrs VS reviewed since prior progress note.  Most recent are:  Visit Vitals    /83 (BP 1 Location: Left arm, BP Patient Position: At rest)    Pulse 79    Temp 98.7 °F (37.1 °C)    Resp 20    Ht 6' 1.5\" (1.867 m)    Wt 100.7 kg (222 lb)    SpO2 92%    BMI 28.89 kg/m2         Intake/Output Summary (Last 24 hours) at 08/02/17 2103  Last data filed at 08/02/17 8589   Gross per 24 hour   Intake              150 ml   Output                0 ml   Net              150 ml        Physical Examination:             Constitutional:  No acute distress, cooperative, pleasant    ENT:  Oral mucous moist, oropharynx benign. Neck supple,    Resp:  CTA bilaterally. No wheezing/rhonchi/rales. No accessory muscle use   CV:  Regular rhythm, normal rate, no murmurs, gallops, rubs    GI:  Soft, non distended, non tender. normoactive bowel sounds, no hepatosplenomegaly     Musculoskeletal:  No edema, warm, 2+ pulses throughout    Neurologic:  Moves all extremities. Oriented to name but not to place or time. CN II-XII reviewed            Data Review:          Labs:     Recent Labs      08/01/17   1135   WBC  3.7*   HGB  13.0   HCT  38.6   PLT  150     Recent Labs      08/02/17   0011  08/01/17   1135   NA  139  134*   K  3.8  3.7   CL  106  104   CO2  25  24   BUN  12  13   CREA  0.96  0.95   GLU  111*  120*   CA  9.2  9.3   MG   --   1.9     Recent Labs      08/01/17   1135   SGOT  18   ALT  19   AP  55   TBILI  0.5   TP  7.2   ALB  3.6   GLOB  3.6     No results for input(s): INR, PTP, APTT in the last 72 hours. No lab exists for component: INREXT   No results for input(s): FE, TIBC, PSAT, FERR in the last 72 hours. Lab Results   Component Value Date/Time    Folate 15.4 05/12/2015 11:30 AM      No results for input(s): PH, PCO2, PO2 in the last 72 hours.   Recent Labs      08/02/17   0011  08/01/17   1800  08/01/17   1135   TROIQ  <0.04  <0.04  <0.04     Lab Results   Component Value Date/Time    Cholesterol, total 177 08/01/2017 11:35 AM    HDL Cholesterol 49 08/01/2017 11:35 AM    LDL, calculated 110 08/01/2017 11:35 AM Triglyceride 90 08/01/2017 11:35 AM    CHOL/HDL Ratio 3.6 08/01/2017 11:35 AM     Lab Results   Component Value Date/Time    Glucose (POC) 152 06/16/2014 12:39 PM     Lab Results   Component Value Date/Time    Color YELLOW/STRAW 08/01/2017 09:55 PM    Appearance CLEAR 08/01/2017 09:55 PM    Specific gravity 1.009 08/01/2017 09:55 PM    pH (UA) 6.0 08/01/2017 09:55 PM    Protein NEGATIVE  08/01/2017 09:55 PM    Glucose NEGATIVE  08/01/2017 09:55 PM    Ketone NEGATIVE  08/01/2017 09:55 PM    Bilirubin NEGATIVE  08/01/2017 09:55 PM    Urobilinogen 0.2 08/01/2017 09:55 PM    Nitrites NEGATIVE  08/01/2017 09:55 PM    Leukocyte Esterase NEGATIVE  08/01/2017 09:55 PM    Epithelial cells FEW 08/01/2017 09:55 PM    Bacteria NEGATIVE  08/01/2017 09:55 PM    WBC 0-4 08/01/2017 09:55 PM    RBC 0-5 08/01/2017 09:55 PM         Medications Reviewed:     Current Facility-Administered Medications   Medication Dose Route Frequency    tamsulosin (FLOMAX) capsule 0.4 mg  0.4 mg Oral QHS    aspirin delayed-release tablet 81 mg  81 mg Oral DAILY    cholecalciferol (VITAMIN D3) tablet 2,000 Units  2,000 Units Oral DAILY    docusate sodium (COLACE) capsule 100 mg  100 mg Oral BID    escitalopram oxalate (LEXAPRO) tablet 10 mg  10 mg Oral DAILY    metoprolol succinate (TOPROL-XL) XL tablet 25 mg  25 mg Oral DAILY    sodium chloride (NS) flush 5-10 mL  5-10 mL IntraVENous Q8H    sodium chloride (NS) flush 5-10 mL  5-10 mL IntraVENous PRN    0.9% sodium chloride infusion  50 mL/hr IntraVENous CONTINUOUS    acetaminophen (TYLENOL) tablet 650 mg  650 mg Oral Q4H PRN    hydrALAZINE (APRESOLINE) 20 mg/mL injection 10 mg  10 mg IntraVENous Q6H PRN     ______________________________________________________________________  EXPECTED LENGTH OF STAY: - - -  ACTUAL LENGTH OF STAY:          0                 Hero Damian MD

## 2017-08-03 NOTE — PROGRESS NOTES
Bedside and Verbal shift change report given to [facility] NAZARIO RN (oncoming nurse) by Miryam Lees RN (offgoing nurse). Report included the following information SBAR, Kardex, Intake/Output and Med Rec Status.

## 2017-08-03 NOTE — PROGRESS NOTES
Hospitalist Progress Note  Eder Goncalves MD  Office: [phone]  Cell: [phone]      Date of Service:  2017  NAME:  Az Willard  :  1931  MRN:  [id number]      Admission Summary:     The patient is a 80year old  male with past medical history of BPH, hypertension, atrial fibrillation,  Diverticular disease, GERD, dementia who presented to the ED with complaint bilateral lower extremity weakness. Interval history / Subjective:       F/u for bilateral lower extremity weakness.     No acute overnight events. Patient is a poor historian secondary to dementia. Assessment & Plan:     Bilateral lower extremity weakness/generalised body weakness: This is likely secondary to deconditioning. CT of the head shows central volume loss, progressive as compared to 2014. No acute ICH. PT recommending SNF Vs LTC vs home with HHPT. Patient's family request patient be discharged to a SNF as wife may not be able to care for patient at home. - Consult case management. Consult OT     Dementia; Appears progressive and advanced. He is calm and without any aggressive behavior. TSH 1.09. Check Vitamin b12  Continue supportive care     Atrial fibrillation: Paroxysmal. He is rate controlled and in SR. S/p pacemaker. Continue aspirin and metoprolol for rate control.     Essential HTN: BP is stable  Continue metoprolol        Code status: Full  DVT prophylaxis: SCD     Care Plan discussed with: Patient/Family and Nurse  Disposition: TBD. To SNF VS Home with 24 hr care        Hospital Problems  Date Reviewed: 2017          Codes Class Noted POA    * (Principal)Altered mental status ICD-10-CM: R41.82  ICD-9-CM: 780.97  2017 Unknown                Review of Systems:   Pertinent items are noted in HPI. Vital Signs:    Last 24hrs VS reviewed since prior progress note.  Most recent are:  Visit Vitals    /83 (BP 1 Location: Left arm, BP Patient Position: At rest)    Pulse 79    Temp 98.7 °F (37.1 °C)    Resp 20    Ht 6' 1.5\" (1.867 m)    Wt 100.7 kg (222 lb)    SpO2 92%    BMI 28.89 kg/m2         Intake/Output Summary (Last 24 hours) at 08/02/17 2126  Last data filed at 08/02/17 1225   Gross per 24 hour   Intake              150 ml   Output                0 ml   Net              150 ml        Physical Examination:             Constitutional:  No acute distress, cooperative, pleasant    ENT:  Oral mucous moist, oropharynx benign. Neck supple,    Resp:  CTA bilaterally. No wheezing/rhonchi/rales. No accessory muscle use   CV:  Regular rhythm, normal rate, no murmurs, gallops, rubs    GI:  Soft, non distended, non tender. normoactive bowel sounds, no hepatosplenomegaly     Musculoskeletal:  No edema, warm, 2+ pulses throughout    Neurologic:  Moves all extremities. Awake. Oriented to name but not to place or time. ,             Data Review:          Labs:     Recent Labs      08/01/17   1135   WBC  3.7*   HGB  13.0   HCT  38.6   PLT  150     Recent Labs      08/02/17   0011  08/01/17   1135   NA  139  134*   K  3.8  3.7   CL  106  104   CO2  25  24   BUN  12  13   CREA  0.96  0.95   GLU  111*  120*   CA  9.2  9.3   MG   --   1.9     Recent Labs      08/01/17   1135   SGOT  18   ALT  19   AP  55   TBILI  0.5   TP  7.2   ALB  3.6   GLOB  3.6     No results for input(s): INR, PTP, APTT in the last 72 hours. No lab exists for component: INREXT   No results for input(s): FE, TIBC, PSAT, FERR in the last 72 hours. Lab Results   Component Value Date/Time    Folate 15.4 05/12/2015 11:30 AM      No results for input(s): PH, PCO2, PO2 in the last 72 hours.   Recent Labs      08/02/17   0011  08/01/17   1800  08/01/17   1135   TROIQ  <0.04  <0.04  <0.04     Lab Results   Component Value Date/Time    Cholesterol, total 177 08/01/2017 11:35 AM    HDL Cholesterol 49 08/01/2017 11:35 AM    LDL, calculated 110 08/01/2017 11:35 AM Triglyceride 90 08/01/2017 11:35 AM    CHOL/HDL Ratio 3.6 08/01/2017 11:35 AM     Lab Results   Component Value Date/Time    Glucose (POC) 152 06/16/2014 12:39 PM     Lab Results   Component Value Date/Time    Color YELLOW/STRAW 08/01/2017 09:55 PM    Appearance CLEAR 08/01/2017 09:55 PM    Specific gravity 1.009 08/01/2017 09:55 PM    pH (UA) 6.0 08/01/2017 09:55 PM    Protein NEGATIVE  08/01/2017 09:55 PM    Glucose NEGATIVE  08/01/2017 09:55 PM    Ketone NEGATIVE  08/01/2017 09:55 PM    Bilirubin NEGATIVE  08/01/2017 09:55 PM    Urobilinogen 0.2 08/01/2017 09:55 PM    Nitrites NEGATIVE  08/01/2017 09:55 PM    Leukocyte Esterase NEGATIVE  08/01/2017 09:55 PM    Epithelial cells FEW 08/01/2017 09:55 PM    Bacteria NEGATIVE  08/01/2017 09:55 PM    WBC 0-4 08/01/2017 09:55 PM    RBC 0-5 08/01/2017 09:55 PM         Medications Reviewed:     Current Facility-Administered Medications   Medication Dose Route Frequency    tamsulosin (FLOMAX) capsule 0.4 mg  0.4 mg Oral QHS    aspirin delayed-release tablet 81 mg  81 mg Oral DAILY    cholecalciferol (VITAMIN D3) tablet 2,000 Units  2,000 Units Oral DAILY    docusate sodium (COLACE) capsule 100 mg  100 mg Oral BID    escitalopram oxalate (LEXAPRO) tablet 10 mg  10 mg Oral DAILY    metoprolol succinate (TOPROL-XL) XL tablet 25 mg  25 mg Oral DAILY    sodium chloride (NS) flush 5-10 mL  5-10 mL IntraVENous Q8H    sodium chloride (NS) flush 5-10 mL  5-10 mL IntraVENous PRN    0.9% sodium chloride infusion  50 mL/hr IntraVENous CONTINUOUS    acetaminophen (TYLENOL) tablet 650 mg  650 mg Oral Q4H PRN    hydrALAZINE (APRESOLINE) 20 mg/mL injection 10 mg  10 mg IntraVENous Q6H PRN     ______________________________________________________________________  EXPECTED LENGTH OF STAY: - - -  ACTUAL LENGTH OF STAY:          0                 Patricia Philip MD

## 2017-08-03 NOTE — PROGRESS NOTES
Bedside shift change report given to Adarsh Calvillo RN (oncoming nurse) by Iirs Porter RN (offgoing nurse). Report included the following information SBAR and Kardex.

## 2017-08-03 NOTE — PROGRESS NOTES
CM reviewed chart and met with pt's daughter, son, and wife this am.  Pt's family understood that either needed to return home with 24 hours caregivers or needed to consider admission into a facility. Pt's family is leaning more towards 24 hour caregivers at home. They are calling currently agency to see if they can provided extended hours, CM also provided them with a list of private duty agencies to call if they need to consider another agency. CM also provided pt with a list of SNFs to look into incase they decided on placement. Family stated that both [facility] were close, but they also were interested in contacting [facility] as well. CM will continue to follow. CM has tried to call daughter to follow up on progress, left daughter a message. Received call back from daughter, their agency is working on trying to get a caregiver to start tonight, however they said it would not likely happen until tomorrow night. Daughter also stated that they are currently getting ready to tour a facility. Daughter stated that they plan on having caregivers in place for 24 hour care starting tomorrow. CM will continue to follow.   Camilla Clifton, BETTIEW, ACM

## 2017-08-03 NOTE — PROGRESS NOTES
Physical Therapy  8. 1.17    Chart reviewed. PT approached by RN, RN stating MD requesting patient be OOB to chair. In to assist RN with potential transfer, however patient supine, not opening eyes when cued, drowsy and wincing in pain. Patient not appropriate for transfer to chair due to safety and increasingly compared command following and drowsiness. PT assisted RN with repositioning patient in bed, noting significant pain in L shoulder during this. F/u tomorrow as appropriate for further treatment. Thank you. **Based on patient's evaluation yesterday and his presentation today, patient would require 24 hr assist x2 persons (not including his elderly wife), hospital bed, w/c with cushion, and potential cecile lift for OOB mobility to chair if patient were to d/c home (due to his observation status). Would also need HHPT/OT for family training in patient mobility.  Shailesh Burns, PT, DPT

## 2017-08-03 NOTE — PROGRESS NOTES
Problem: Self Care Deficits Care Plan (Adult)  Goal: *Acute Goals and Plan of Care (Insert Text)  Occupational Therapy Goals  Initiated 8/3/2017  1. Patient will perform grooming standing at the sink with minimal assistance/contact guard assist within 7 day(s). 2. Patient will perform toilet transfers with minimal assistance/contact guard assist within 7 day(s). 3. Patient will perform all aspects of toileting with minimal assistance/contact guard assist within 7 day(s). 4. Patient will participate in upper extremity therapeutic exercise/activities with supervision/set-up within 7 day(s). OCCUPATIONAL THERAPY EVALUATION  Patient: Huber Acuña (73 y.o. male)  Date: 8/3/2017  Primary Diagnosis: Altered mental status  Altered mental status        Precautions:   Fall, Bed Alarm (sitter; dementia)      ASSESSMENT :  Based on the objective data described below, the patient presents with generalized weakness, decreased standing balance and impaired cognition (although it should be noted pt does have a history of dementia but home care aid reports he is below his baseline). Pt receives assist at baseline for dressing/bathing but is ambulatory with a SPC (although tends to ambulate w/o it even though he is supposed to use), but can feed himself, brush his teeth at the sink and ambulate to/from the bathroom (is also continent). Pt currently is dependent for all ADLs and noted needing assist for feeding at lunch today. Pt demonstrated physical ability to feed self during evaluation and drank from a cup using a straw with setup and maximal VCs so anticipate he can feed self with VCs/supervision to initiate tasks. Pt able to move to EOB with MOD A and side step along EOB with MOD A to MIN A x 2 following simple 1 step commands, then returned to supine position. Feel pt would be safe with supervision and using a recliner style chair to sit up next session.        Patient will benefit from skilled intervention to address the above impairments. Patients rehabilitation potential is considered to be Guarded  Factors which may influence rehabilitation potential include:   [ ]             None noted  [X]             Mental ability/status  [ ]             Medical condition  [X]             Home/family situation and support systems  [ ]             Safety awareness  [ ]             Pain tolerance/management  [ ]             Other:        PLAN :  Recommendations and Planned Interventions:  [X]               Self Care Training                  [X]        Therapeutic Activities  [X]               Functional Mobility Training    [ ]        Cognitive Retraining  [X]               Therapeutic Exercises           [X]        Endurance Activities  [X]               Balance Training                   [ ]        Neuromuscular Re-Education  [ ]               Visual/Perceptual Training     [X]   Home Safety Training  [X]               Patient Education                 [X]        Family Training/Education  [ ]               Other (comment):     Frequency/Duration: Patient will be followed by occupational therapy 3 times a week to address goals. Discharge Recommendations: Home Health with 24 hour care  Vs [facility]  Further Equipment Recommendations for Discharge: BSC, wc, RW if home       SUBJECTIVE:   Patient pleasant and cooperative. Did not recognize his wife but recognized his caretaker by name.         OBJECTIVE DATA SUMMARY:   HISTORY:   Past Medical History:   Diagnosis Date    Adhesive capsulitis of shoulder        Sunny Soni notes-injected 3/17/16    Advance directive in chart 12/31/14     SEE SCANNED Form    Arrhythmia      Atrial fibrillation ([facility] [address]      BPH       [facility] [place]    Cancer [facility]) 10/2010     basal cell dr Yaya Tijerina Cancer [facility]) 8/2012     squamous cell    Constipation      Contact dermatitis and other eczema, due to unspecified cause       ak    Dementia       7/27/15 copy of daniel garcia    Diverticular disease       dr Rodrigo Morillo DJD of shoulder       dr Perla Valentine  7/22/15 notes    ED (erectile dysfunction)       [facility]    [facility] infection of nail [id number]     [facility]    Gallbladder calculus without cholecystitis 4/12/2016    GERD (gastroesophageal reflux disease)       reflux dr Sita mann    Headache       migraines    Hearing loss      Hip fracture, right ([address]      Hypertension      Insomnia      Left knee pain 5/27/14 7/28/16     patellofemoral arthrosis of right knee  1/26/17 f/u    Left shoulder pain 3/31/14  1/7/16     [facility] 5/5/17    Nosebleed       6/8/17 note from [facility]    OAB (overactive bladder)       [facility]    Osteopenia 3/2007    PAC (premature atrial contraction)      Pacemaker       7/29/15 note about pacer check    Panic attacks      PAT (paroxysmal atrial tachycardia) (HCC)      Peripheral neuropathy (HCC)      PVC (premature ventricular contraction)      S/P laparoscopic cholecystectomy 4/25/2016    Shingles 9/2010     dr Rosalia Quezada Tinnitus      Varicocele       dr Layo Ryan     Past Surgical History:   Procedure Laterality Date    EGD        HX APPENDECTOMY   11/2008     dr ashlee Pemberton GI         colonoscopy    HX HEART CATHETERIZATION   4/6/2012     mild to moderate CAD in LAD and OM, RCA, less than 50% lesions    HX HEENT         cataract left    HX LAP CHOLECYSTECTOMY   4/13/16    HX ORTHOPAEDIC         rt femur fx.  HX PACEMAKER        INS PPM/ICD LED DUAL ONLY   4/26/2012          IR INSERT PACEMAKER FLUORO GUIDE   4/26/2012          [place] COLONOSCOPY FLX DX W/COLLJ SPEC WHEN PFRMD   1/27/2012          [place] EGD TRANSORAL BIOPSY SINGLE/MULTIPLE   1/27/2012              Prior Level of Function/Home Situation: hx provided by caregiver.   Pt is ambulatory with SPC (but often forgets to use), is continent, ambulates to/from bathroom but has assist for bathing/dressing. He can feed/groom himself. Expanded or extensive additional review of patient history:      Home Situation  Home Environment: Private residence  One/Two Story Residence: One story  Living Alone: No  Support Systems: Child(travis), Spouse/Significant Other/Partner, Home care staff (caregiver 9-5 everyday)  Patient Expects to be Discharged to[de-identified] Private residence  Current DME Used/Available at Home: [employer], [employer]  [X]  Right hand dominant             [ ]  Left hand dominant     EXAMINATION OF PERFORMANCE DEFICITS:  Cognitive/Behavioral Status:  Neurologic State: Alert;Confused  Orientation Level: Oriented to person;Disoriented to time;Disoriented to situation;Disoriented to place  Cognition: Follows commands;Memory loss     Perseveration: No perseveration noted  Safety/Judgement: Decreased awareness of environment;Decreased awareness of need for assistance;Decreased awareness of need for safety;Decreased insight into deficits; Fall prevention     Skin: intact     Edema: None noted     Hearing: Auditory  Auditory Impairment: None     Vision/Perceptual:          Range of Motion:  AROM: Generally decreased, functional     Strength:  Strength: Generally decreased, functional     Coordination:  Coordination: Generally decreased, functional  Fine Motor Skills-Upper: Left Intact; Right Intact    Gross Motor Skills-Upper: Left Intact; Right Intact     Tone & Sensation:  Tone: Abnormal     Balance:  Sitting: Intact  Standing: Impaired  Standing - Static: Fair  Standing - Dynamic : Poor     Functional Mobility and Transfers for ADLs:  Bed Mobility:  Supine to Sit: Moderate assistance     Transfers:  Sit to Stand: Moderate assistance  Toilet Transfer :  Moderate assistance (MOD a to stand; MIn A X 2 to take steps)     ADL Assessment:  Feeding: Setup (may need assist due to cognition)     Oral Facial Hygiene/Grooming: Setup     Bathing: Maximum assistance     Upper Body Dressing: Maximum assistance Lower Body Dressing: Total assistance     Toileting: Total assistance  Cognitive Retraining  Safety/Judgement: Decreased awareness of environment;Decreased awareness of need for assistance;Decreased awareness of need for safety;Decreased insight into deficits; Fall prevention     Functional Measure:  Barthel Index:      Bathin  Bladder: 0  Bowels: 0  Groomin  Dressin  Feedin  Mobility: 0  Stairs: 0  Toilet Use: 0  Transfer (Bed to Chair and Back): 5  Total: 15         Barthel and G-code impairment scale:  Percentage of impairment CH  0% CI  1-19% CJ  20-39% CK  40-59% CL  60-79% CM  80-99% CN  100%   Barthel Score 0-100 100 99-80 79-60 59-40 20-39 1-19    0   Barthel Score 0-20 20 17-19 13-16 9-12 5-8 1-4 0      The Barthel ADL Index: Guidelines  1. The index should be used as a record of what a patient does, not as a record of what a patient could do. 2. The main aim is to establish degree of independence from any help, physical or verbal, however minor and for whatever reason. 3. The need for supervision renders the patient not independent. 4. A patient's performance should be established using the best available evidence. Asking the patient, friends/relatives and nurses are the usual sources, but direct observation and common sense are also important. However direct testing is not needed. 5. Usually the patient's performance over the preceding 24-48 hours is important, but occasionally longer periods will be relevant. 6. Middle categories imply that the patient supplies over 50 per cent of the effort. 7. Use of aids to be independent is allowed. Verónica Jo., Barthel, D.W. (2169). Functional evaluation: the Barthel Index. [address] [facility] (14)2. PRISCILA Humphries, Xavi Luque., Sharan Pritchard., Clarissa, [facility] Evangelista Grimes (). Measuring the change indisability after inpatient rehabilitation; comparison of the responsiveness of the Barthel Index and Functional [place] Measure.  Journal of Neurology, Neurosurgery, and Psychiatry, 664), 103-013. [place] Patience, [place], RATNA Rios, & Ignacio Colmenares M.A. (2004.) Assessment of post-stroke quality of life in cost-effectiveness studies: The usefulness of the Barthel Index and the EuroQoL-5D. Quality of Life Research, 13, 087-62         G codes: In compliance with CMSs Claims Based Outcome Reporting, the following G-code set was chosen for this patient based on their primary functional limitation being treated: The outcome measure chosen to determine the severity of the functional limitation was the Barthel Index with a score of 15/100 which was correlated with the impairment scale. · Self Care:               - CURRENT STATUS:    CM - 80%-99% impaired, limited or restricted               - GOAL STATUS:           CK - 40%-59% impaired, limited or restricted               - D/C STATUS:                       ---------------To be determined---------------      Occupational Therapy Evaluation Charge Determination   History Examination Decision-Making   MEDIUM Complexity : Expanded review of history including physical, cognitive and psychosocial  history  MEDIUM Complexity : 3-5 performance deficits relating to physical, cognitive , or psychosocial skils that result in activity limitations and / or participation restrictions HIGH Complexity : Patient presents with comorbidities that affect occupational performance. Signifigant modification of tasks or assistance (eg, physical or verbal) with assessment (s) is necessary to enable patient to complete evaluation       Based on the above components, the patient evaluation is determined to be of the following complexity level: MEDIUM  Pain:  Pain Scale 1: Numeric (0 - 10)  Pain Intensity 1: 0              Activity Tolerance:   No s/s of distress     Please refer to the flowsheet for vital signs taken during this treatment.   After treatment:   [ ] Patient left in no apparent distress sitting up in chair  [X] Patient left in no apparent distress in bed  [X] Call bell left within reach  [ ] Nursing notified  [ ] Caregiver present  [ ] Bed alarm activated      COMMUNICATION/EDUCATION:   The patients plan of care was discussed with: Physical Therapist and Registered Nurse. [ ] Home safety education was provided and the patient/caregiver indicated understanding. [ ] Patient/family have participated as able in goal setting and plan of care. [ ] Patient/family agree to work toward stated goals and plan of care. [ ] Patient understands intent and goals of therapy, but is neutral about his/her participation. [X] Patient is unable to participate in goal setting and plan of care. This patients plan of care is appropriate for delegation to \[facility]\"".      Thank you for this referral.  Tin Whitaker OT  Time Calculation: 18 mins

## 2017-08-04 NOTE — PROGRESS NOTES
Problem: Falls - Risk of  Goal: *Absence of falls  Outcome: Progressing Towards Goal  Bed in low and locked position. Gripper socks on patient.     Problem: Pressure Injury - Risk of  Goal: *Prevention of pressure ulcer  Outcome: Progressing Towards Goal    08/03/17 2145 08/04/17 0928   Wound Prevention and Protection Methods   Orientation of Wound Prevention --  Posterior   Location of Wound Prevention --  Sacrum/Coccyx   Dressing Present  --  No   Read Only, Retired: Wound Treatment (non-mechanical)   Wound Offloading (Prevention Methods) Bed, pressure reduction mattress;Pillows;Repositioning;Turning --

## 2017-08-04 NOTE — PROGRESS NOTES
0800 Bedside and Verbal shift change report given to [address] (oncoming nurse) by Luis Felipe Nye (offgoing nurse). Report included the following information SBAR, Kardex, ED Summary, Procedure Summary, Intake/Output, MAR and Recent Results.

## 2017-08-04 NOTE — PROGRESS NOTES
Hospitalist Progress Note  Eduar Menjivar MD  Office: [phone]  Cell: [phone]      Date of Service:  8/3/2017  NAME:  Aditi Avitia  :  1931  MRN:  [id number]      Admission Summary:     The patient is a 80year old  male with past medical history of BPH, hypertension, atrial fibrillation,  Diverticular disease, GERD, dementia who presented to the ED with complaint bilateral lower extremity weakness. Interval history / Subjective:       F/u for bilateral lower extremity weakness.     No acute overnight events. Patient is a poor historian secondary to dementia. Assessment & Plan:     Bilateral lower extremity weakness/generalised body weakness: This is likely secondary to deconditioning. CT of the head shows central volume loss, progressive as compared to 2014. No acute ICH. PT recommending SNF Vs LTC vs home with HHPT with 24 hr care  - Continue PT/OT     Dementia; Appears progressive and advanced. He is calm and without any aggressive behavior. TSH 1.09. Vitamin b12 low normal at 272. Start empiric oral vital b12 supplementation  Continue supportive care     Atrial fibrillation: Paroxysmal. He is rate controlled and in SR. S/p pacemaker. Continue aspirin and metoprolol for rate control.     Essential HTN: BP is stable but labile  Continue metoprolol        Code status: Full  DVT prophylaxis: SCD     Care Plan discussed with: Patient/Family and Nurse  Disposition: He will be discharged home with HHPT with 24 hour care. Hospital Problems  Date Reviewed: 2017          Codes Class Noted POA    * (Principal)Altered mental status ICD-10-CM: R41.82  ICD-9-CM: 780.97  2017 Unknown                Review of Systems:   Pertinent items are noted in HPI. Vital Signs:    Last 24hrs VS reviewed since prior progress note.  Most recent are:  Visit Vitals    BP (!) 154/91 (BP 1 Location: Left arm, BP Patient Position: At rest;Supine)    Pulse 80    Temp 98.8 °F (37.1 °C)    Resp 17    Ht 6' 1.5\" (1.867 m)    Wt 100.7 kg (222 lb)    SpO2 94%    BMI 28.89 kg/m2         Intake/Output Summary (Last 24 hours) at 08/03/17 2116  Last data filed at 08/03/17 1337   Gross per 24 hour   Intake              275 ml   Output                0 ml   Net              275 ml        Physical Examination:             Constitutional:  No acute distress, cooperative, pleasant    ENT:  Oral mucous moist, oropharynx benign. Neck supple,    Resp:  CTA bilaterally. No wheezing/rhonchi/rales. No accessory muscle use   CV:  Regular rhythm, normal rate, no murmurs, gallops, rubs    GI:  Soft, non distended, non tender. normoactive bowel sounds, no hepatosplenomegaly     Musculoskeletal:  No edema, warm, 2+ pulses throughout    Neurologic:  Moves all extremities. Awake. Oriented to name but not to place or time. ,             Data Review:          Labs:     Recent Labs      08/01/17   1135   WBC  3.7*   HGB  13.0   HCT  38.6   PLT  150     Recent Labs      08/02/17   0011  08/01/17   1135   NA  139  134*   K  3.8  3.7   CL  106  104   CO2  25  24   BUN  12  13   CREA  0.96  0.95   GLU  111*  120*   CA  9.2  9.3   MG   --   1.9     Recent Labs      08/01/17   1135   SGOT  18   ALT  19   AP  55   TBILI  0.5   TP  7.2   ALB  3.6   GLOB  3.6     No results for input(s): INR, PTP, APTT in the last 72 hours. No lab exists for component: INREXT, INREXT   No results for input(s): FE, TIBC, PSAT, FERR in the last 72 hours. Lab Results   Component Value Date/Time    Folate 15.4 05/12/2015 11:30 AM      No results for input(s): PH, PCO2, PO2 in the last 72 hours.   Recent Labs      08/02/17   0011  08/01/17   1800  08/01/17   1135   TROIQ  <0.04  <0.04  <0.04  <0.04     Lab Results   Component Value Date/Time    Cholesterol, total 177 08/01/2017 11:35 AM    HDL Cholesterol 49 08/01/2017 11:35 AM    LDL, calculated 110 08/01/2017 11:35 AM Triglyceride 90 08/01/2017 11:35 AM    CHOL/HDL Ratio 3.6 08/01/2017 11:35 AM     Lab Results   Component Value Date/Time    Glucose (POC) 152 06/16/2014 12:39 PM     Lab Results   Component Value Date/Time    Color YELLOW/STRAW 08/01/2017 09:55 PM    Appearance CLEAR 08/01/2017 09:55 PM    Specific gravity 1.009 08/01/2017 09:55 PM    pH (UA) 6.0 08/01/2017 09:55 PM    Protein NEGATIVE  08/01/2017 09:55 PM    Glucose NEGATIVE  08/01/2017 09:55 PM    Ketone NEGATIVE  08/01/2017 09:55 PM    Bilirubin NEGATIVE  08/01/2017 09:55 PM    Urobilinogen 0.2 08/01/2017 09:55 PM    Nitrites NEGATIVE  08/01/2017 09:55 PM    Leukocyte Esterase NEGATIVE  08/01/2017 09:55 PM    Epithelial cells FEW 08/01/2017 09:55 PM    Bacteria NEGATIVE  08/01/2017 09:55 PM    WBC 0-4 08/01/2017 09:55 PM    RBC 0-5 08/01/2017 09:55 PM         Medications Reviewed:     Current Facility-Administered Medications   Medication Dose Route Frequency    cyanocobalamin (VITAMIN B12) tablet 1,000 mcg  1,000 mcg Oral DAILY    haloperidol lactate (HALDOL) injection 0.5 mg  0.5 mg IntraVENous Q6H PRN    tamsulosin (FLOMAX) capsule 0.4 mg  0.4 mg Oral QHS    aspirin delayed-release tablet 81 mg  81 mg Oral DAILY    cholecalciferol (VITAMIN D3) tablet 2,000 Units  2,000 Units Oral DAILY    docusate sodium (COLACE) capsule 100 mg  100 mg Oral BID    escitalopram oxalate (LEXAPRO) tablet 10 mg  10 mg Oral DAILY    metoprolol succinate (TOPROL-XL) XL tablet 25 mg  25 mg Oral DAILY    sodium chloride (NS) flush 5-10 mL  5-10 mL IntraVENous Q8H    sodium chloride (NS) flush 5-10 mL  5-10 mL IntraVENous PRN    acetaminophen (TYLENOL) tablet 650 mg  650 mg Oral Q4H PRN    hydrALAZINE (APRESOLINE) 20 mg/mL injection 10 mg  10 mg IntraVENous Q6H PRN     ______________________________________________________________________  EXPECTED LENGTH OF STAY: - - -  ACTUAL LENGTH OF STAY:          0                 Ashish Garcia MD

## 2017-08-04 NOTE — DISCHARGE SUMMARY
Discharge Summary       PATIENT ID: Huber Acuña  MRN: [id number]   YOB: 1931    DATE OF ADMISSION: 8/1/2017 10:59 AM    DATE OF DISCHARGE: 8/4/2017  PRIMARY CARE PROVIDER: Lilly Castleman, MD       DISCHARGING PHYSICIAN: Karlos Jane MD    To contact this individual call [phone] and ask the  to page. If unavailable ask to be transferred the Adult Hospitalist Department. CONSULTATIONS: IP CONSULT TO HOSPITALIST    PROCEDURES/SURGERIES: * No surgery found *    ADMITTING DIAGNOSES & HOSPITAL COURSE:     The patient is a 80year old  male with past medical history of BPH, hypertension, atrial fibrillation,  Diverticular disease, GERD, dementia who presented to the ED with complaint bilateral lower extremity weakness.     Patient was relatively stable on admission. Workup including Head CT scan and metabolic workup were unremarkable. Physical therapy and occupational therapy was consulted. Based on his declining functional status, it was recommended that he needs home health physical therapy with 24 hour supervision. DISCHARGE DIAGNOSES / PLAN:      Bilateral lower extremity weakness/generalised body weakness: This is likely secondary to physical deconditioning. CT of the head shows central volume loss, progressive as compared to 2014. No acute ICH or stroke. PT consulted and recommended SNF Vs LTC vs home with HHPT with 24 hr care      Dementia; Appears progressive and advanced. He is calm and without any aggressive behavior. TSH 1.09. Vitamin b12 low normal at 272. Start empiric oral vital b12 supplementation  Continue supportive care      Atrial fibrillation: Paroxysmal. He is rate controlled and in SR. S/p pacemaker. Continue aspirin and metoprolol for rate control.      Essential HTN: BP is stable but labile  Continue metoprolol         Care Plan discussed with: Patient/Family and Nurse  Disposition prior to discharge: He is hemodynamically stable.  Home with Home health PT with 24 hr care       PENDING TEST RESULTS:   At the time of discharge the following test results are still pending:     FOLLOW UP APPOINTMENTS:    Follow-up Information     Follow up With Details Comments [address], [place]   [address] Zein[address] Jones  [address] [facility] [address]  [phone]             ADDITIONAL CARE RECOMMENDATIONS:     DIET: Cardiac Diet    ACTIVITY: Activity as tolerated    WOUND CARE: None    EQUIPMENT needed:       DISCHARGE MEDICATIONS:  Current Discharge Medication List      START taking these medications    Details   cyanocobalamin 1,000 mcg tablet Take 1 Tab by mouth daily. Indications: PREVENTION OF VITAMIN B12 DEFICIENCY  Qty: 60 Tab, Refills: 2         CONTINUE these medications which have NOT CHANGED    Details   acetaminophen (TYLENOL) 325 mg tablet Take 650 mg by mouth two (2) times daily as needed for Pain. alfuzosin SR (UROXATRAL) 10 mg SR tablet Take 10 mg by mouth every evening. aspirin delayed-release 81 mg tablet Take 81 mg by mouth daily. cholecalciferol (VITAMIN D3) 1,000 unit tablet Take 2,000 Units by mouth daily. docusate sodium (COLACE) 100 mg capsule Take 100 mg by mouth two (2) times a day. escitalopram oxalate (LEXAPRO) 10 mg tablet Take 10 mg by mouth every morning. metoprolol succinate (TOPROL-XL) 25 mg XL tablet Take 25 mg by mouth daily. psyllium (METAMUCIL) packet Take 1 Packet by mouth daily. NOTIFY YOUR PHYSICIAN FOR ANY OF THE FOLLOWING:   Fever over 101 degrees for 24 hours. Chest pain, shortness of breath, fever, chills, nausea, vomiting, diarrhea, change in mentation, falling, weakness, bleeding. Severe pain or pain not relieved by medications. Or, any other signs or symptoms that you may have questions about.     DISPOSITION:  x  Home With:   OT  PT  SOCORROx  RN       Long term SNF/Inpatient Rehab    Independent/assisted living    Hospice    Other: PATIENT CONDITION AT DISCHARGE:     Functional status    Poor    x Deconditioned     Independent      Cognition     Lucid     Forgetful    x Dementia      Catheters/lines (plus indication)    Souza     PICC     PEG    x None      Code status    x Full code     DNR      PHYSICAL EXAMINATION AT DISCHARGE:   Refer to Progress Note      CHRONIC MEDICAL DIAGNOSES:  Problem List as of 8/4/2017  Date Reviewed: 8/1/2017          Codes Class Noted - Resolved    * (Principal)Altered mental status ICD-10-CM: R41.82  ICD-9-CM: 780.97  8/1/2017 - Present        Bradycardia ICD-10-CM: R00.1  ICD-9-CM: 427.89  4/27/2016 - Present        S/P laparoscopic cholecystectomy ICD-10-CM: Z90.49  ICD-9-CM: V45.89  4/25/2016 - Present        Dementia ICD-10-CM: F03.90  ICD-9-CM: 294.20  3/2/2015 - Present        Vasovagal syncope ICD-10-CM: R55  ICD-9-CM: 780.2  12/31/2014 - Present        S/P placement of cardiac pacemaker ICD-10-CM: Z95.0  ICD-9-CM: V45.01  12/29/2014 - Present        CAD (coronary artery disease) ICD-10-CM: I25.10  ICD-9-CM: 414.00  10/10/2014 - Present        Atrial fibrillation ([facility] ICD-10-CM: I48.91  ICD-9-CM: 427.31  10/10/2014 - Present        Aortic stenosis ICD-10-CM: I35.0  ICD-9-CM: 424.1  10/10/2014 - Present    Overview Signed 12/31/2014  1:09 PM by Jayde Heard MD     mild             Vitamin D deficiency ICD-10-CM: E55.9  ICD-9-CM: 268.9  2/24/2014 - Present        Nocturia ICD-10-CM: R35.1  ICD-9-CM: 788.43  5/28/2013 - Present        Chronic left shoulder pain ICD-10-CM: M25.512, G89.29  ICD-9-CM: 719.41, 338.29  5/11/2012 - Present        Tachycardia-bradycardia syndrome ([facility] ICD-10-CM: I49.5  ICD-9-CM: 427.81  4/12/2012 - Present    Overview Signed 4/27/2012  7:48 AM by Rain Worthy MD     S/p dual chamber [employer] pacemaker implant 4/26/2012             Sick sinus syndrome [facility]) ICD-10-CM: I49.5  ICD-9-CM: 427.81  4/12/2012 - Present        Constipation ICD-10-CM: K59.00  ICD-9-CM: 564.00  12/6/2011 - Present        RLS (restless legs syndrome) ICD-10-CM: G25.81  ICD-9-CM: 333.94  6/14/2011 - Present        Arthritis ICD-10-CM: M19.90  ICD-9-CM: 716.90  12/30/2010 - Present        GERD (gastroesophageal reflux disease) ICD-10-CM: K21.9  ICD-9-CM: 530.81  12/30/2010 - Present        Urine incontinence ICD-10-CM: R32  ICD-9-CM: 788.30  10/28/2010 - Present        Essential hypertension ICD-10-CM: I10  ICD-9-CM: 401.9  2/18/2010 - Present        Pure hypercholesterolemia ICD-10-CM: E78.00  ICD-9-CM: 272.0  2/18/2010 - Present        Hyperglycemia ICD-10-CM: R73.9  ICD-9-CM: 790.29  2/18/2010 - Present        RESOLVED: Gallbladder calculus without cholecystitis ICD-10-CM: K80.20  ICD-9-CM: 574.20  4/12/2016 - 4/25/2016        RESOLVED: Syncope ICD-10-CM: R55  ICD-9-CM: 780.2  12/29/2014 - 1/5/2015        RESOLVED: Syncope and collapse ICD-10-CM: R55  ICD-9-CM: 780.2  12/29/2014 - 1/5/2015        RESOLVED: Insomnia ICD-10-CM: G47.00  ICD-9-CM: 780.52  5/11/2012 - 11/3/2014        RESOLVED: Shoulder pain ICD-10-CM: M25.519  ICD-9-CM: 719.41  12/6/2011 - 5/11/2012        RESOLVED: Trigeminal neuralgia ICD-10-CM: G50.0  ICD-9-CM: 350.1  10/14/2010 - 5/28/2013        RESOLVED: Memory disturbance ICD-10-CM: R41.3  ICD-9-CM: 780.93  9/9/2010 - 5/22/2017        RESOLVED: Allergic rhinitis, cause unspecified ICD-10-CM: J30.9  ICD-9-CM: 477.9  2/11/2010 - 5/22/2017        RESOLVED: Right hip pain ICD-10-CM: M25.551  ICD-9-CM: 719.45  1/11/2010 - 5/22/2017              Greater than 30 minutes were spent with the patient on counseling and coordination of care    Signed:   Jamshid Mcgarry MD  8/4/2017  8:36 AM

## 2017-08-15 NOTE — TELEPHONE ENCOUNTER
Call from Physical Therapist Caroline Gudino and he is calling to request hospital bed for patient. Patient is in a private group nursing home since discharge from the hospital.   Daughter is concerned about his decline over the last week and Caroline Gudino thinks that a hospital bed would help the staff get him up out of the bed more frequently. They also have a cecile lift they may need to use for him. Caroline Gudino is going out to check patient tomorrow and he will call back with his findings. He is in the process of finding a vendor that takes the patient's insurance--he will let me know tomorrow. We have not seen patient since May and Medicare will most likely require a face to face eval for the bed. Caroline Gudino will check on this as well and let us know.  Advised Dr David Marquez will be glad to order the needed supplies

## 2017-08-16 NOTE — TELEPHONE ENCOUNTER
Return call from Omi Elias- PT seeing patient and he did evaluation today. He was able to get patient up walking today and is working with the staff on transfers and using the cecile lift. He has contacted the  [address] is a vendor they can use for a hospital bed and they would also like a wheelchair. Omi Elias will contact patient's dtr to have a follow up visit scheduled. We can not order the supplies without a face to face visit.

## 2017-08-18 NOTE — TELEPHONE ENCOUNTER
Spoke with dtr and she has questions about the hospital bed and wheelchair. I spoke with Damien Carbajal at [phone] and he said patient would have to have the face to face evaluation before we can order these needed supplies. His last visit was in May and did not address the need for the supplies.  This info to daughter and Damien Raven will also look into how best to transport to the office-he required transport service when discharged from the hospital.

## 2017-08-18 NOTE — TELEPHONE ENCOUNTER
[place], patients daughter requesting a return call to discuss health issues concerns. Her contact # is [phone].

## 2017-08-28 NOTE — PROGRESS NOTES
Chief Complaint   Patient presents with   [facility] Follow Up     8/1/17 [facility]. Patient needs a wheelchair and hospital bed. His bed and wheel chair needs to be for a tall person. 1. Have you been to the ER, urgent care clinic since your last visit? Hospitalized since your last visit? Yes When: 8/1/17 Where: [facility] Reason for visit: Altered Mental status. 2. Have you seen or consulted any other health care providers outside of the [facility] since your last visit? Include any pap smears or colon screening. No     The patient was counseled on the dangers of tobacco use, and Patient is a non smoker. Reviewed strategies to maximize success, including Continue not to smoke. Advance Care Plan is on file. I have reviewed Health Maintenance with the patient and updated.

## 2017-08-28 NOTE — MR AVS SNAPSHOT
Visit Information Date & Time Provider Department Dept. Phone Encounter #  
 8/28/2017  2:00 PM Lore Mata MD [facility] [phone] [id number] Follow-up Instructions Return if symptoms worsen or fail to improve. Your Appointments 10/12/2017 11:00 AM  
PACEMAKER with [id number] (NAV SCHEDULING) Appt Note: 6 month follow up & pacer check [address] [place] [address]  
[address] [place] [address]  
  
    
 10/12/2017 11:20 AM  
ESTABLISHED PATIENT with Waldemar Garcia MD  
[facility] ([address]) Appt Note: 6 month follow up & pacer check [address] [place] [address]  
[address] [place] [address] Upcoming Health Maintenance Date Due  
 GLAUCOMA SCREENING Q2Y 11/26/2017* INFLUENZA AGE 9 TO ADULT 11/26/2017* DTaP/Tdap/Td series (1 - Tdap) 9/1/2021* MEDICARE YEARLY EXAM 5/23/2018 *Topic was postponed. The date shown is not the original due date. Allergies as of 8/28/2017  Review Complete On: 8/28/2017 By: Zoya Jay RN Severity Noted Reaction Type Reactions Erythromycin  08/06/2009    Nausea Only Pcn [Penicillins]  08/06/2009    Unknown (comments) Current Immunizations  Reviewed on 10/26/2015 Name Date H1N1 FLU VACCINE 1/11/2010 Influenza High Dose Vaccine PF 9/18/2015, 9/12/2014 Influenza Vaccine 9/29/2014 Influenza Vaccine Split 9/4/2010 Pneumococcal Conjugate (PCV-13) 11/3/2014 TD Vaccine 9/1/2011, 9/20/2000 ZZZ-RETIRED (DO NOT USE) Pneumococcal Vaccine (Unspecified Type) 10/1/1998 Zoster 3/26/2007 Not reviewed this visit You Were Diagnosed With   
  
 Codes Comments  Altered mental status, unspecified altered mental status type    - Primary ICD-10-CM: R41.82 
ICD-9-CM: 780.97 Dementia without behavioral disturbance, unspecified dementia type     ICD-10-CM: F03.90 ICD-9-CM: 294.20 Weakness generalized     ICD-10-CM: R53.1 ICD-9-CM: 780.79 Vitals BP Pulse Temp Resp Height(growth percentile) Weight(growth percentile) 120/66 (BP 1 Location: Left leg, BP Patient Position: Sitting) (!) 52 97.9 °F (36.6 °C) (Oral) 16 6' 1.5\" (1.867 m) 222 lb (100.7 kg) SpO2 BMI Smoking Status 95% 28.89 kg/m2 Former Smoker BMI and BSA Data Body Mass Index Body Surface Area  
 28.89 kg/m 2 2.29 m 2 Preferred Pharmacy Pharmacy Name Phone [address] [place], [address] [phone] Your Updated Medication List  
  
   
This list is accurate as of: 8/28/17  2:33 PM.  Always use your most recent med list.  
  
  
  
  
 acetaminophen 325 mg tablet Commonly known as:  TYLENOL Take 650 mg by mouth two (2) times daily as needed for Pain. alfuzosin SR 10 mg SR tablet Commonly known as:  Maeola Kristen Take 10 mg by mouth every evening. aspirin delayed-release 81 mg tablet Take 81 mg by mouth daily. cholecalciferol 1,000 unit tablet Commonly known as:  VITAMIN D3 Take 2,000 Units by mouth daily. cyanocobalamin 1,000 mcg tablet Take 1 Tab by mouth daily. Indications: PREVENTION OF VITAMIN B12 DEFICIENCY  
  
 docusate sodium 100 mg capsule Commonly known as:  Radha Busing Take 100 mg by mouth two (2) times a day. escitalopram oxalate 10 mg tablet Commonly known as:  Marlena Narrow Take 10 mg by mouth every morning. metoprolol succinate 25 mg XL tablet Commonly known as:  TOPROL-XL Take 25 mg by mouth daily. psyllium packet Commonly known as:  METAMUCIL Take 1 Packet by mouth daily. Follow-up Instructions Return if symptoms worsen or fail to improve.   
  
To-Do List   
 08/29/2017 6:00 AM  
 Appointment with Carol Billings at [address] [facility] SERVICES! Dear Lizeth Moses: Thank you for requesting a "[employer]" account. Our records indicate that you have previously registered for a "[employer]" account but its currently inactive. Please call our "[employer]" support line at [phone]. Additional Information If you have questions, please visit the Frequently Asked Questions section of the "[employer]" website at [email or web address] Remember, "[employer]" is NOT to be used for urgent needs. For medical emergencies, dial 911. Now available from your iPhone and Android! Please provide this summary of care documentation to your next provider. Your primary care clinician is listed as [id number] If you have any questions after today's visit, please call [phone].

## 2017-08-28 NOTE — PROGRESS NOTES
Wife in hospital for 3-4 nights. When she came home patient became more fatigued, shaky. Went to ER 8/1. Admitted for alt mental status. Discharged end of week to assisted living facility. Needs wheelchair for his frame and hospital bed. Getting PT 2-3 times weekly. Still not walking on own. Able to go 15 feet with walker. Needs hospital bed to be able to assist him with getting in and out of bed. No reason for the weakness noted at hospital admission. B12 added for low nml level. No meds stopped. Visit Vitals    /66 (BP 1 Location: Left leg, BP Patient Position: Sitting)    Pulse (!) 52    Temp 97.9 °F (36.6 °C) (Oral)    Resp 16    Ht 6' 1.5\" (1.867 m)    Wt 222 lb (100.7 kg)    SpO2 95%    BMI 28.89 kg/m2       Patient alert and cooperative. Reviewed above. Assessment:  1. Altered mental status in the face of early dementia with weakness and inability to ambulate. Plan:  1. Needs orders for hospital bed and wheelchair at the assisted living facility. 2. Follow up here otherwise prn.

## 2017-08-30 NOTE — LETTER
8/30/2017 3:10 PM 
 
Mr. Ngo Pew [address] [facility] [address] [address] [employer] To Whom It May Concern: 
 
 
Please order a wheelchair for our patient, Gustavo Gabriel. He has dementia and per his physical therapist, he is lethargic and unable to ambulate safely. His mental condition and lethargy puts him at high risk for falls. Sincerely, Lilly Castleman, MD

## 2017-08-30 NOTE — PROGRESS NOTES
Phone call to [facility], patients Physical Therapist and we were calling to let him know that we can not order hospital bed for patient since he does not meet the Medicare criteria for this item. [facility] still needs him to have a wheelchair. Patient is very lethargic today and they are having a hard time getting up him up moving today. His vital signs are stable and his blood pressure is 120/70 and he is afebrile. I also spoke to Bhargavi Chang at [facility] to see if we could order a wheelchair. They still need a letter that states he is not able to ambulate and is a fall risk.  We will have this written and sent to [facility]

## 2017-08-31 PROBLEM — M00.9 ARTHRITIS, SEPTIC, KNEE (HCC): Status: ACTIVE | Noted: 2017-01-01

## 2017-08-31 NOTE — IP AVS SNAPSHOT
[address] [facility] [address] [place] [address] 
[phone] Patient: Trey Root MRN: [id number] You are allergic to the following Allergen Reactions Erythromycin Nausea Only Recent Documentation Height Weight BMI Smoking Status 1.867 m 90.5 kg 25.97 kg/m2 Former Smoker Unresulted Labs Order Current Status CULTURE, BLOOD Preliminary result Emergency Contacts Name Discharge Info Relation Home Work Mobile Radha Rios DISCHARGE CAREGIVER [3] Spouse [3] [phone] SharrijuanitoKayleigh DISCHARGE CAREGIVER [3] Child [2] [phone] About your hospitalization You were admitted on:  August 31, 2017 You last received care in the:  [address] [facility] You were discharged on:  September 6, 2017 Unit phone number:  [phone] Why you were hospitalized Your primary diagnosis was: Arthritis, Septic, Knee (Hcc) Providers Seen During Your Hospitalizations Provider Role Specialty Primary office phone Cynthia Gusman MD Attending Provider Emergency Medicine [phone] Thao Overton MD Attending Provider Hospitalist [phone] Carrington Avendaño MD Attending Provider Internal Medicine [phone] Diaz Mosley MD Attending Provider Internal Medicine [phone] Your Primary Care Physician (PCP) Primary Care Physician Office Phone Office Fax Olimpia Horne [phone] Follow-up Information Follow up With Details Comments Contact Info Roberta DOUGLAS [facility]   [address] [place] [place] [address] [place] [address] 
[phone] Mary Jo Fofana 
[address] [facility] [address] [employer] [address] [place], [place] [id number] 
[phone] Your Appointments Thursday October 12, 2017 11:00 AM EDT  
PACEMAKER with Cruz Mai [facility] ([facility]) [address] [facility]  
[phone] Thursday October 12, 2017 11:20 AM EDT  
ESTABLISHED PATIENT with Timothy Cline MD  
[facility] ([address]) [address] [facility] 57  
[phone] Current Discharge Medication List  
  
START taking these medications Dose & Instructions Dispensing Information Comments Morning Noon Evening Bedtime  
 cephALEXin 500 mg capsule Commonly known as:  Sharon Blank Your last dose was: Your next dose is:    
   
   
 Dose:  500 mg Take 1 Cap by mouth four (4) times daily for 14 days. Quantity:  56 Cap Refills:  0 CONTINUE these medications which have NOT CHANGED Dose & Instructions Dispensing Information Comments Morning Noon Evening Bedtime  
 acetaminophen 325 mg tablet Commonly known as:  TYLENOL Your last dose was: Your next dose is:    
   
   
 Dose:  650 mg Take 650 mg by mouth two (2) times daily as needed for Pain. Refills:  0  
     
   
   
   
  
 alfuzosin SR 10 mg SR tablet Commonly known as:  Arbcory Danes Your last dose was: Your next dose is:    
   
   
 Dose:  10 mg Take 10 mg by mouth every evening. Refills:  0  
     
   
   
   
  
 aspirin delayed-release 81 mg tablet Your last dose was: Your next dose is:    
   
   
 Dose:  81 mg Take 81 mg by mouth daily. Refills:  0  
     
   
   
   
  
 cholecalciferol 1,000 unit tablet Commonly known as:  VITAMIN D3 Your last dose was: Your next dose is:    
   
   
 Dose:  2000 Units Take 2,000 Units by mouth daily. Refills:  0  
     
   
   
   
  
 cyanocobalamin 1,000 mcg tablet Your last dose was: Your next dose is:    
   
   
 Dose:  1000 mcg Take 1 Tab by mouth daily.  Indications: PREVENTION OF VITAMIN B12 DEFICIENCY  
 Quantity:  60 Tab Refills:  2  
     
   
   
   
  
 docusate sodium 100 mg capsule Commonly known as:  Beto Hobson Your last dose was: Your next dose is:    
   
   
 Dose:  100 mg Take 100 mg by mouth two (2) times a day. Refills:  0  
     
   
   
   
  
 escitalopram oxalate 10 mg tablet Commonly known as:  Brayden Aas Your last dose was: Your next dose is:    
   
   
 Dose:  10 mg Take 10 mg by mouth every morning. Refills:  0  
     
   
   
   
  
 metoprolol succinate 25 mg XL tablet Commonly known as:  TOPROL-XL Your last dose was: Your next dose is:    
   
   
 Dose:  25 mg Take 25 mg by mouth daily. Refills:  0  
     
   
   
   
  
 psyllium packet Commonly known as:  METAMUCIL Your last dose was: Your next dose is:    
   
   
 Dose:  1 Packet Take 1 Packet by mouth daily. Refills:  0 Where to Get Your Medications These medications were sent to [address] [place] [address], [address] [place], [place] [id number] Phone:  [phone]  
  cephALEXin 500 mg capsule Discharge Instructions Discharge Instructions PATIENT ID: Mela Araujo MRN: [id number] YOB: 1931 DATE OF ADMISSION: 8/31/2017 11:36 AM   
DATE OF DISCHARGE: 9/6/2017 PRIMARY CARE PROVIDER: Kentrell Silva MD  
 
ATTENDING PHYSICIAN: Katelin Brooks MD 
DISCHARGING PROVIDER: Katelin Brooks MD   
To contact this individual call [phone] and ask the  to page. If unavailable ask to be transferred the Adult Hospitalist Department. DISCHARGE DIAGNOSES  
- prepatellar bursitis of the right knee CONSULTATIONS: IP CONSULT TO ORTHOPEDIC SURGERY 
IP CONSULT TO INFECTIOUS DISEASES PROCEDURES/SURGERIES: Procedure(s): 
INCISION AND DRAINAGE PREPATELLAR BURSA  RIGHT KNEE PENDING TEST RESULTS:  
 At the time of discharge the following test results are still pending: none FOLLOW UP APPOINTMENTS:  
Follow-up Information Follow up With Details Comments Contact Info Deborah DOUGLAS [facility]   [address] Mar[address] [place] [address] 
[phone] Mary Jo Ely 
[address] [facility] [address] [employer] [place] [address] 
[phone] ADDITIONAL CARE RECOMMENDATIONS:  
· PT/ OT eval and treat DIET: Regular Diet ACTIVITY: up with assistance WOUND CARE: right knee as per protocol EQUIPMENT needed: right knee immobilizer DISCHARGE MEDICATIONS: 
 See Medication Reconciliation Form · It is important that you take the medication exactly as they are prescribed. · Keep your medication in the bottles provided by the pharmacist and keep a list of the medication names, dosages, and times to be taken in your wallet. · Do not take other medications without consulting your doctor. NOTIFY YOUR PHYSICIAN FOR ANY OF THE FOLLOWING:  
Fever over 101 degrees for 24 hours. Chest pain, shortness of breath, fever, chills, nausea, vomiting, diarrhea, change in mentation, falling, weakness, bleeding. Severe pain or pain not relieved by medications. Or, any other signs or symptoms that you may have questions about. DISPOSITION: 
  Home With: 
x OT x PT  HH  RN  
  
x SNF/Inpatient Rehab/LTAC Independent/assisted living Hospice Other: CDMP Checked:  
Yes x PROBLEM LIST Updated: 
Yes x Signed:  
William Minor MD 
9/6/2017 
2:27 PM 
 
Discharge Orders None ACO Transitions of Care [address] offers a voluntary care coordination program to provide high quality service and care to [facility] fee-for-service beneficiaries. Melly Cequint was designed to help you enhance your health and well-being through the following services: ? Transitions of Care  support for individuals who are transitioning from one care setting to another (example: Hospital to home). ? Chronic and Complex Care Coordination  support for individuals and caregivers of those with serious or chronic illnesses or with more than one chronic (ongoing) condition and those who take a number of different medications. If you meet specific medical criteria, a [address] [place] [address] Rd may call you directly to coordinate your care with your primary care physician and your other care providers. For questions about the [facility] programs, please, contact your physicians office. For general questions or additional information about Accountable Care Organizations: 
Please visit www.medicare.gov/acos. html or call 1-800-MEDICARE (1-344.508.3108) TTY users should call 5-989.158.7916. Introducing [facility] SERVICES! Dear [place]: Thank you for requesting a [employer] account. Our records indicate that you have previously registered for a [employer] account but its currently inactive. Please call our [employer] support line at [phone]. Additional Information If you have questions, please visit the Frequently Asked Questions section of the [employer] website at [email or web address] Remember, [employer] is NOT to be used for urgent needs. For medical emergencies, dial 911. Now available from your iPhone and Android! General Information Please provide this summary of care documentation to your next provider. Patient Signature:  ____________________________________________________________ Date:  ____________________________________________________________  
  
[place] [facility] Provider Signature:  ____________________________________________________________ Date:  ____________________________________________________________

## 2017-08-31 NOTE — ED NOTES
Wife at nurses station requesting food for pt. Informed her order for dinner tray was placed at 1753 for double portions as requested. Still awaiting dinner tray. Wife responds, \"Well its been two hours. \"  Explained to pts wife that its only been a little over an hour. Wife dissatisfied and walked back into room. Attempting to call dietary with no success.

## 2017-08-31 NOTE — ED NOTES
Spoke with dr. Ashish Pickering in [place] regarding pt. States will assign other admitting physician to see pt.   Notified charge rn

## 2017-08-31 NOTE — ED PROVIDER NOTES
HPI Comments: 80 y.o. male with extensive past medical history, please see list, significant for Dementia, BCCA, A-fib, Pacemaker, HTN, DJD of shoulder, osteopenia,  who presents from [facility] via EMS with chief complaint of right knee pain. EMS reports that pt's facility stated that the pt has \"always\" been c/o left shoulder pain and right knee pain. Recently , however, the right knee has become red, swollen, and warm to the touch. EMS notes the pt to be tachycardic in the low 100's en route (120's initially). Review of the pt's chart reveals that he was seen 3 days ago by his PCP for f/u after being admitted here for AMS 8/1 - 8/4/17. Pt states that his right knee has been bothering him for the last year, and believes he has arthritis in his left shoulder. There are no other acute medical concerns at this time. Social hx: Former smoker (quit 1982) No alcohol use. PCP: Daniel Perez MD    Note written by Scott To, as dictated by Cynthia Gusman MD  12:01 PM     The history is provided by the patient and the EMS personnel. History limited by: dementia. No  was used.         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    Constipation     Contact dermatitis and other eczema, due to unspecified cause     ak    Dementia     7/27/15 copy of neuropsych eval    Diverticular disease     dr Cheli Martinez DJD of shoulder     dr Jeniffer Fraire  7/22/15 notes    ED (erectile dysfunction)     [facility]    Fungal infection of nail [id number]    [facility]    Gallbladder calculus without cholecystitis 4/12/2016    GERD (gastroesophageal reflux disease)     reflux dr Claudia Morales Headache     migraines    Hearing loss     Hip fracture, right ([address]     Hypertension     Insomnia     Left knee pain 5/27/14 7/28/16    patellofemoral arthrosis of right knee  1/26/17 f/u    Left shoulder pain 3/31/14  1/7/16    [facility]D 5/5/17    Nosebleed     6/8/17 note from ENT Zina Susan May    OAB (overactive bladder)     [facility]    Osteopenia 3/2007    PAC (premature atrial contraction)     Pacemaker     7/29/15 note about pacer check    Panic attacks     PAT (paroxysmal atrial tachycardia) (HCC)     Peripheral neuropathy (HCC)     PVC (premature ventricular contraction)     S/P laparoscopic cholecystectomy 4/25/2016    Shingles 9/2010    dr Chrystal Denton   [facility] Tinnitus     Varicocele     dr Zoya Martin       Past Surgical History:   Procedure Laterality Date    EGD      HX APPENDECTOMY  11/2008    dr ashlee Hughes GI      colonoscopy    HX HEART CATHETERIZATION  4/6/2012    mild to moderate CAD in LAD and OM, RCA, less than 50% lesions    HX HEENT      cataract left    HX LAP CHOLECYSTECTOMY  4/13/16    HX ORTHOPAEDIC      rt femur fx.  HX PACEMAKER      INS PPM/ICD LED DUAL ONLY  4/26/2012         IR INSERT PACEMAKER FLUORO GUIDE  4/26/2012         [place] COLONOSCOPY FLX DX W/COLLJ SPEC WHEN PFRMD  1/27/2012         [place] EGD TRANSORAL BIOPSY SINGLE/MULTIPLE  1/27/2012              Family History:   Problem Relation Age of Onset    Stroke Father     Dementia Father     Heart Disease Mother     Headache Daughter     Other Sister      fibromyalgia    Arthritis-osteo Brother     Other Brother      stomach       Social History     Social History    Marital status:      Spouse name: N/A    Number of children: N/A    Years of education: N/A     Occupational History    Not on file.      Social History Main Topics    Smoking status: Former Smoker     Packs/day: 1.00     Years: 30.00     Types: Cigarettes     Quit date: 1/1/1982    Smokeless tobacco: Never Used    Alcohol use No    Drug use: No    Sexual activity: Yes     Partners: Female     Other Topics Concern    Not on file     Social History Narrative         ALLERGIES: Erythromycin and Pcn [penicillins]    Review of Systems   Unable to perform ROS: Dementia       Vitals:    08/31/17 1153   BP: 107/74   Pulse: 85   Resp: 19   Temp: 98.6 °F (37 °C)   SpO2: 96%   Height: 6' 1.5\" (1.867 m)            Physical Exam   Constitutional: He appears well-developed and well-nourished. No distress. Chronically ill appearance   HENT:   Head: Normocephalic and atraumatic. Eyes: Pupils are equal, round, and reactive to light. Neck: Normal range of motion. Neck supple. Cardiovascular: Normal rate, regular rhythm and normal heart sounds. Exam reveals no gallop and no friction rub. No murmur heard. Pulmonary/Chest: Effort normal and breath sounds normal. No respiratory distress. He has no wheezes. Abdominal: Soft. Bowel sounds are normal. He exhibits no distension. There is no tenderness. There is no rebound and no guarding. Musculoskeletal: Normal range of motion. Right knee: Erythematous, swollen, and warm to the touch with lateral edema; unable to tolerate ROM. Left shoulder: decreased ROM without crepitus or instep; no pin point tenderness. Neurological: He is alert. Skin: Skin is warm. No rash noted. He is not diaphoretic. Psychiatric: He has a normal mood and affect. His behavior is normal. Judgment and thought content normal.   Nursing note and vitals reviewed. Note written by Scott Lezama, as dictated by Cecy Bowser MD 12:05 PM     [facility]  ED Course   This is an 80-year-old male with extensive past medical history including dementia, chronic joint pain, degenerative joint disease, arthritis, presenting with complaints of left shoulder pain, and right knee pain.  His complaints are documented in his previous charts, secondary likely to arthritis. However upon presentation today, right knee is noted to be swollen, erythematous, and hot to the touch. The patient is as above, demented, and cannot contribute to his review of systems. She is resistant to range of motion of the right knee, left shoulder range with assistance, without crepitus or instability. His exam is otherwise unremarkable. Plan to obtain CMP, CBC, uric acid, ESR, CRP, as well as plain films of the left shoulder right knee. We'll make a disposition based on the patient's diagnostics response to therapy. Procedures    PROGRESS NOTE:  1:50 PM  Orthopedics is at bedside to see the pt.      2:15 PM  Plan to admit, will initiate abx. Plan for OR tomorrow.

## 2017-08-31 NOTE — PROGRESS NOTES
Patient from the [facility] complaining with shoulder and knee pain. Assessment: Septic pre-patellar bursitis, right knee     Plan: Aspiration performed in ER. After prepping the knee with betadine, an 18G needle was introduced into the pre-patellar bursa of the right knee. 3cc of pus was aspirated and sent for cell count, culture, and gram stain. Knee placed in compressive dressing with knee immobilizer and ice. IV antibiotics started. Consult Hospitalist for admission and medical management. Plan to take to OR tomorrow for I&D of right knee pre-patellar bursa. NPO after midnight.      Ham Mena RN CRM

## 2017-08-31 NOTE — ED NOTES
Assumed care of patient from [facility]. Patient resting on stretcher, NAD noted at this time; Denies complaints. Bed low and locked, call bell in reach. Will continue to monitor.

## 2017-08-31 NOTE — IP AVS SNAPSHOT
[address] 
[phone] Patient: Ashish Castillo MRN: [id number] Current Discharge Medication List  
  
START taking these medications Dose & Instructions Dispensing Information Comments Morning Noon Evening Bedtime  
 cephALEXin 500 mg capsule Commonly known as:  Reinaldo Jeramiee Your last dose was: Your next dose is:    
   
   
 Dose:  500 mg Take 1 Cap by mouth four (4) times daily for 14 days. Quantity:  56 Cap Refills:  0 CONTINUE these medications which have NOT CHANGED Dose & Instructions Dispensing Information Comments Morning Noon Evening Bedtime  
 acetaminophen 325 mg tablet Commonly known as:  TYLENOL Your last dose was: Your next dose is:    
   
   
 Dose:  650 mg Take 650 mg by mouth two (2) times daily as needed for Pain. Refills:  0  
     
   
   
   
  
 alfuzosin SR 10 mg SR tablet Commonly known as:  Dre Chaney Your last dose was: Your next dose is:    
   
   
 Dose:  10 mg Take 10 mg by mouth every evening. Refills:  0  
     
   
   
   
  
 aspirin delayed-release 81 mg tablet Your last dose was: Your next dose is:    
   
   
 Dose:  81 mg Take 81 mg by mouth daily. Refills:  0  
     
   
   
   
  
 cholecalciferol 1,000 unit tablet Commonly known as:  VITAMIN D3 Your last dose was: Your next dose is:    
   
   
 Dose:  2000 Units Take 2,000 Units by mouth daily. Refills:  0  
     
   
   
   
  
 cyanocobalamin 1,000 mcg tablet Your last dose was: Your next dose is:    
   
   
 Dose:  1000 mcg Take 1 Tab by mouth daily. Indications: PREVENTION OF VITAMIN B12 DEFICIENCY Quantity:  60 Tab Refills:  2  
     
   
   
   
  
 docusate sodium 100 mg capsule Commonly known as:  Clarissa Zuñiga Your last dose was: Your next dose is:    
   
   
 Dose:  100 mg Take 100 mg by mouth two (2) times a day. Refills:  0  
     
   
   
   
  
 escitalopram oxalate 10 mg tablet Commonly known as:  Louvella Milo Your last dose was: Your next dose is:    
   
   
 Dose:  10 mg Take 10 mg by mouth every morning. Refills:  0  
     
   
   
   
  
 metoprolol succinate 25 mg XL tablet Commonly known as:  TOPROL-XL Your last dose was: Your next dose is:    
   
   
 Dose:  25 mg Take 25 mg by mouth daily. Refills:  0  
     
   
   
   
  
 psyllium packet Commonly known as:  METAMUCIL Your last dose was: Your next dose is:    
   
   
 Dose:  1 Packet Take 1 Packet by mouth daily. Refills:  0 Where to Get Your Medications These medications were sent to [address] [place] [address], [facility] [address] [address] Phone:  [phone]  
  cephALEXin 500 mg capsule

## 2017-08-31 NOTE — ED NOTES
Pt resting in stretcher. Awaiting hospitalist admission orders/evaluation. Wife at bedside. Pt tolerating PO intake.

## 2017-08-31 NOTE — ED TRIAGE NOTES
Arrives via EMS from [facility] for chronic left shoulder pain and chronic right knee pain but states recently right knee became red, swollen, and increased warmth. EMS reports HR originally in 120's, low 100's en route.

## 2017-08-31 NOTE — ED NOTES
ED EKG interpretation (12:02 PM):  Rhythm: sinus arrhythmia. Rate (approx.): 85; ST/T wave: without ST or T wave changes.    Note written by Billi Fleischer, Scribe, as dictated by Mar Cruz MD 3:21 PM

## 2017-08-31 NOTE — CONSULTS
Geriatric Ortho Consult  Patient: Mela Araujo  YOB: 1931   MRN: [id number]      Consult Date: 8/31/2017     Chief Complaint: Right knee pain  ED Presentation Time: < 8 hours  Mechanism of Injury: Patient with dementia, unable to provide reliable history  Ambulatory Status: Chair Bound  Past Medical History:   Past Medical History:   Diagnosis Date    Adhesive capsulitis of shoulder      Velvet Levin notes-injected 3/17/16    Advance directive in chart 12/31/14    SEE SCANNED Form    Arrhythmia     Atrial fibrillation ([address]     B[facility]     [facility]    Cancer [facility]) 10/2010    basal cell dr Moses Lal Cancer [facility]) 8/2012    squamous cell    Constipation     Contact dermatitis and other eczema, due to unspecified cause     ak    Dementia     7/27/15 copy of neuropsych eval    Diverticular disease     dr James Garza DJD of shoulder     dr Js Boyd  7/22/15 notes    ED [facility]ile dysfunction)     [facility]    F[facility] nail [id number]    [facility]    Gallbladder calculus without cholecystitis 4/12/2016    GERD (gastroesophageal reflux disease)     reflux dr Tanner Zeinab Headache     migraines    Hearing loss     Hip fracture, right ([address]     Hypertension     Insomnia     Left knee pain 5/27/14 7/28/16    patellofemoral arthrosis of right knee  1/26/17 f/u    Left shoulder pain 3/31/14  1/7/16    [facility] 5/5/17    Nosebleed     6/8/17 note from ENT Lyndy Pilling May    OAB (overactive bladder)     [facility]    Osteopenia 3/2007    PAC (premature atrial contraction)     Pacemaker     7/29/15 note about pacer check    Panic attacks     PAT (paroxysmal atrial tachycardia) ([address]     Peripheral neuropathy ([address]     PVC (premature ventricular contraction)     S/P laparoscopic cholecystectomy 4/25/2016    Shingles 9/2010    dr Kaykay Graham Tinnitus     Varicocele     dr Abigail Shah       Allergies: Allergies   Allergen Reactions    Erythromycin Nausea Only    Pcn [Penicillins] Unknown (comments)      Past Surgical History:   Past Surgical History:   Procedure Laterality Date    EGD      HX APPENDECTOMY  11/2008    dr ashlee Marie GI      colonoscopy    HX HEART CATHETERIZATION  4/6/2012    mild to moderate CAD in LAD and OM, RCA, less than 50% lesions    HX HEENT      cataract left    HX LAP CHOLECYSTECTOMY  4/13/16    HX ORTHOPAEDIC      rt femur fx.  HX PACEMAKER      INS PPM/ICD LED DUAL ONLY  4/26/2012         IR INSERT PACEMAKER FLUORO GUIDE  4/26/2012         VA COLONOSCOPY FLX DX W/COLLJ SPEC WHEN PFRMD  1/27/2012         VA EGD TRANSORAL BIOPSY SINGLE/MULTIPLE  1/27/2012           Social History:   Social History     Social History    Marital status:      Spouse name: N/A    Number of children: N/A    Years of education: N/A     Occupational History    Not on file. Social History Main Topics    Smoking status: Former Smoker     Packs/day: 1.00     Years: 30.00     Types: Cigarettes     Quit date: 1/1/1982    Smokeless tobacco: Never Used    Alcohol use No    Drug use: No    Sexual activity: Yes     Partners: Female     Other Topics Concern    Not on file     Social History Narrative      Dwelling Status: Skilled Nursing Facility  Current Anticoagulant Medications: Aspirin    Labs:    Lab Results   Component Value Date/Time    HGB 13.4 08/31/2017 11:56 AM    WBC 11.5 08/31/2017 11:56 AM    INR 1.0 12/29/2014 10:50 AM    Albumin 3.2 08/31/2017 11:56 AM     X-RAYS:   XR KNEE RT MAX 2 VWS 8/31/2017 12:20 PM  INDICATION:   Right knee pain, swelling, erythema and abrasion. COMPARISON: None. FINDINGS: Two views of the right knee demonstrate degenerative change of the  patellofemoral joint and mild narrowing of the tibiofemoral joint with lateral  osteophytes. There is no fracture or other acute abnormality. .  There is no  effusion.   There is soft tissue swelling anterior and below the patella. IMPRESSION:  Osteoarthrosis with anterior soft tissue swelling    Physical Exam:   General: 79yo male, cooperative, no distress, appears stated age  CNS: advanced dementia, introduces self (he goes by Alpha Poor), not oriented to time or place   Vascular: pedal pulses normal and no edema   Skin: right anterior knee erythema and fluctuance, appears to be confined to bursa (extra-articular)   Extremities: right knee as above, bursa exquisitely tender, ROM limited by pain    Assessment: Septic pre-patellar bursitis, right knee    Plan: Aspiration performed in ER. After prepping the knee with betadine, an 18G needle was introduced into the pre-patellar bursa of the right knee. 3cc of pus was aspirated and sent for cell count, culture, and gram stain. Knee placed in compressive dressing with knee immobilizer and ice. IV antibiotics started. Consult Hospitalist for admission and medical management. Plan to take to OR tomorrow for I&D of right knee pre-patellar bursa. NPO after midnight.  Discussed with attending orthopedic surgeon, Dr. Jessica Dale     Signed by: KATRINA French   Today's Date: August 31, 2017

## 2017-08-31 NOTE — PROGRESS NOTES
Admission Medication Reconciliation:    Information obtained from: Medical records (dated 8/4/17)    Significant PMH/Disease States:   Past Medical History:   Diagnosis Date    Adhesive capsulitis of shoulder      Emmit Spatz notes-injected 3/17/16    Advance directive in chart 12/31/14    SEE SCANNED Form    Arrhythmia     Atrial [facility]     BPH     [facility] Jackson Lean    [facility]) 10/2010    basal cell dr Phelps Filter [facility]) 8/2012    squamous cell    Constipation     Contact dermatitis and other eczema, due to unspecified cause     ak    Dementia     7/27/15 copy of neuropsych eval    Diverticular disease     dr Max Spicer DJD of shoulder     dr Maggy Lord  7/22/15 notes    [facility]    [facility] nail [id number]    [facility]    Gallbladder calculus without cholecystitis 4/12/2016    GERD (gastroesophageal reflux disease)     reflux dr Sierra Iron    Headache     migraines    Hearing loss     Hip fracture, right ([facility]     Hypertension     Insomnia     Left knee pain 5/27/14 7/28/16    patellofemoral arthrosis of right knee  1/26/17 f/u    Left shoulder pain 3/31/14  1/7/16    [facility] 5/5/17    Nosebleed     6/8/17 note from ENT Mandi Hinkle    OAB (overactive bladder)     [facility]    Osteopenia 3/2007    PAC (premature atrial contraction)     Pacemaker     7/29/15 note about pacer check    Panic attacks     PAT (paroxysmal atrial tachycardia) (HCC)     Peripheral neuropathy (HCC)     PVC (premature ventricular contraction)     S/P laparoscopic cholecystectomy 4/25/2016    Shingles 9/2010    dr Bertram An   [facility] Tinnitus     Varicocele     dr Faustino Montes       Chief Complaint for this Admission:  knee pain    Allergies:  Erythromycin and Pcn [penicillins]    Prior to Admission Medications:   Prior to Admission Medications   Prescriptions Last Dose Informant Patient Reported? Taking?   acetaminophen (TYLENOL) 325 mg tablet   Yes Yes   Sig: Take 650 mg by mouth two (2) times daily as needed for Pain. alfuzosin SR (UROXATRAL) 10 mg SR tablet   Yes Yes   Sig: Take 10 mg by mouth every evening. aspirin delayed-release 81 mg tablet   Yes Yes   Sig: Take 81 mg by mouth daily. cholecalciferol (VITAMIN D3) 1,000 unit tablet   Yes Yes   Sig: Take 2,000 Units by mouth daily. cyanocobalamin 1,000 mcg tablet   No Yes   Sig: Take 1 Tab by mouth daily. Indications: PREVENTION OF VITAMIN B12 DEFICIENCY   docusate sodium (COLACE) 100 mg capsule   Yes Yes   Sig: Take 100 mg by mouth two (2) times a day. escitalopram oxalate (LEXAPRO) 10 mg tablet   Yes Yes   Sig: Take 10 mg by mouth every morning. metoprolol succinate (TOPROL-XL) 25 mg XL tablet   Yes Yes   Sig: Take 25 mg by mouth daily. psyllium (METAMUCIL) packet   Yes Yes   Sig: Take 1 Packet by mouth daily. Facility-Administered Medications: None       Comments/Recommendations:   N/A    Thank you for allowing me to participate in the care of this patient. The above list now represents the patient's most up-to-date PTA medication list.  Please call the inpatient pharmacy at [phone] with any questions. Jasmine Lopez. D.  Candidate 2018

## 2017-09-01 NOTE — PROGRESS NOTES
Hospitalist Progress Note  Byron Schmitz MD  Office: [phone]        Date of Service:  2017  NAME:  Kendra Orourke  :  1931  MRN:  [id number]      Admission Summary:   The patient is an 70-year-old male   with cognitive impairment, most likely secondary to dementia, history of   atrial fibrillation, bilateral lower extremity weakness, essential   hypertension, who presents today complaining of significant pain and   swelling in his right knee. The patient apparently lives at [facility]   and facility reported that the patient has arthritis and has \"pain always\"   in his right knee and left shoulder. In speaking to the patient I was not   able to get a good history secondary to dementia and no family   relatives are present at the bedside but the history was relied on the   ER note. Apparently the patient started having significant pain in his   right knee with redness, swelling and warmth to touch, and was found   to be mildly tachycardic en route with EMS and was asked to be   evaluated for the same. The patient was evaluated by Orthopedics,   who did a right knee aspiration and the patient was found to be septic   arthritic and the patient was requested to be admitted under the   hospitalist service. I was not notified by the ER physician regarding the   admission and there was a delay in evaluating the patient. Currently   the patient is resting in bed and denies any complaints or problems. Denies any headache, blurry vision, sore throat, trouble swallowing,   trouble with speech, any chest pain, shortness of breath, cough, fever,   chills, abdominal pain, constipation, diarrhea, urinary symptoms, focal   or generalized neurological weakness that is new, recent travel, sick   contacts, falls, injuries, trauma to the knee or any other concerns or   problems. The patient denies any hematemesis, melena, hemoptysis.    The patient was found to have a fever of 101.4 in the past.      Interval history / Subjective:   PatieRnt sleepy after OR. Wife and son at bedside. They stated Mr Mine Aiken has severe dementia to the point he does not recognize family including his wife. Assessment & Plan:   Septic right prepatellar bursitis  -He underwent I &D in the OR 9/1  -Fluid culture came back with heavy staph. Antibiotics narrowed down to Vancomycin  -Infectious disease consulted. -Right LE venous doppler negative for DVT    History of paroxysmal atrial fibrillation  -Currently NSR. Continue lopressor    Hypertension. Stable on lopressor. .   Advanced dementia ,not able to recognize wife and children:lives at [facility],need 24/7 care giver  Body mass index is 25.16 kg/(m^2). Diet:cardiac  Code status: Wife and son both expressed he is DNR. Code status updated to reflect this. DVT prophylaxis: heparin  Care Plan discussed with: wife and son at bedside. Discharge planning/disposition:TBD    Hospital Problems  Date Reviewed: 8/31/2017          Codes Class Noted POA    * (Principal)Arthritis, septic, knee [facility]) ICD-10-CM: M00.9  ICD-9-CM: 711.06  8/31/2017 Unknown                Review of Systems:   Review of systems not obtained due to patient factors. Physical Examination:      Last 24hrs VS reviewed since prior progress note. Most recent are:  Visit Vitals    /80    Pulse 83    Temp (!) 101.4 °F (38.6 °C)    Resp 16    Ht 6' 1.5\" (1.867 m)    Wt 87.7 kg (193 lb 5.5 oz)    SpO2 97%    BMI 25.16 kg/m2           Constitutional:  sleepy post operatively   Eyes: Non icteric,non pallor,no erythema,discharge,PERRLA   ENT:  Oral mucous moist, oropharynx benign. Neck supple,    Resp:  CTA bilaterally. No wheezing/rhonchi/rales. No accessory muscle use   CV:  Regular rhythm, normal rate, no murmurs, gallops, rubs    GI:  Soft, non distended, non tender.  normoactive bowel sounds, no hepatosplenomegaly    :  No CVA or suprapubic tenderness   Skin  : No erythema,rash,bullae,dipigmentation     Musculoskeletal:  Right knee wrapped     Neurologic: Sleeping at he time of eval.            Intake/Output Summary (Last 24 hours) at 09/01/17 1854  Last data filed at 09/01/17 0955   Gross per 24 hour   Intake             2200 ml   Output              450 ml   Net             1750 ml          Data Review:    Review and/or order of clinical lab test  Review and/or order of tests in the radiology section of CPT  Review and/or order of tests in the medicine section of CPT      Labs:     Recent Labs      09/01/17   0400  08/31/17   1156   WBC  9.7  11.5*   HGB  11.5*  13.4   HCT  34.4*  40.0   PLT  261  280     Recent Labs      09/01/17   0400  08/31/17   1156   NA  135*  135*   K  3.6  3.6   CL  102  100   CO2  23  27   BUN  14  15   CREA  0.86  1.01   GLU  126*  127*   CA  9.8  10.3*   URICA   --   3.7     Recent Labs      08/31/17   1156   SGOT  16   ALT  27   AP  88   TBILI  0.7   TP  8.2   ALB  3.2*   GLOB  5.0*     No results for input(s): INR, PTP, APTT in the last 72 hours. No lab exists for component: INREXT   No results for input(s): FE, TIBC, PSAT, FERR in the last 72 hours. Lab Results   Component Value Date/Time    Folate 15.4 05/12/2015 11:30 AM      No results for input(s): PH, PCO2, PO2 in the last 72 hours.   Recent Labs      08/31/17   1156   TROIQ  <0.04     Lab Results   Component Value Date/Time    Cholesterol, total 177 08/01/2017 11:35 AM    HDL Cholesterol 49 08/01/2017 11:35 AM    LDL, calculated 110 08/01/2017 11:35 AM    Triglyceride 90 08/01/2017 11:35 AM    CHOL/HDL Ratio 3.6 08/01/2017 11:35 AM     Lab Results   Component Value Date/Time    Glucose (POC) 152 06/16/2014 12:39 PM     Lab Results   Component Value Date/Time    Color YELLOW/STRAW 08/01/2017 09:55 PM    Appearance CLEAR 08/01/2017 09:55 PM    Specific gravity 1.009 08/01/2017 09:55 PM    pH (UA) 6.0 08/01/2017 09:55 PM    Protein NEGATIVE  08/01/2017 09:55 PM    Glucose NEGATIVE 08/01/2017 09:55 PM    Ketone NEGATIVE  08/01/2017 09:55 PM    Bilirubin NEGATIVE  08/01/2017 09:55 PM    Urobilinogen 0.2 08/01/2017 09:55 PM    Nitrites NEGATIVE  08/01/2017 09:55 PM    Leukocyte Esterase NEGATIVE  08/01/2017 09:55 PM    Epithelial cells FEW 08/01/2017 09:55 PM    Bacteria NEGATIVE  08/01/2017 09:55 PM    WBC 0-4 08/01/2017 09:55 PM    RBC 0-5 08/01/2017 09:55 PM         Medications Reviewed:     Current Facility-Administered Medications   Medication Dose Route Frequency    0.9% sodium chloride infusion  100 mL/hr IntraVENous CONTINUOUS    [START ON 9/2/2017] heparin (porcine) injection 5,000 Units  5,000 Units SubCUTAneous Q12H    naloxone (NARCAN) injection 0.4 mg  0.4 mg IntraVENous PRN    traMADol (ULTRAM) tablet 50 mg  50 mg Oral Q8H PRN    tamsulosin (FLOMAX) capsule 0.4 mg  0.4 mg Oral QHS    aspirin delayed-release tablet 81 mg  81 mg Oral DAILY    docusate sodium (COLACE) capsule 100 mg  100 mg Oral BID    escitalopram oxalate (LEXAPRO) tablet 10 mg  10 mg Oral 7am    metoprolol succinate (TOPROL-XL) XL tablet 25 mg  25 mg Oral DAILY    sodium chloride (NS) flush 5-10 mL  5-10 mL IntraVENous Q8H    sodium chloride (NS) flush 5-10 mL  5-10 mL IntraVENous PRN    acetaminophen (TYLENOL) tablet 650 mg  650 mg Oral Q4H PRN    Vancomycin pharmacy to dose   Other Rx Dosing/Monitoring    vancomycin (VANCOCIN) 1500 mg in  ml infusion  1,500 mg IntraVENous Q16H     ______________________________________________________________________  EXPECTED LENGTH OF STAY: 3d 0h  ACTUAL LENGTH OF STAY:          1                 Maryann Delgado MD

## 2017-09-01 NOTE — OP NOTES
Prepatellar bursa infection Operative Note      Patient: Tracey Vick MRN: [id number]  SSN: [id number]    YOB: 1931  Age: 80 y.o. Sex: male      DATE OF SURGERY:  9/1/2017    Indications:  80-year-old male with acute purulent prepatellar bursa infection and need of operative I and D. Preoperative Diagnosis: right prepatellar bursa infection    Postoperative Diagnosis: right prepatellar bursa and the    Surgeon: Spencer Ohara MD    Anesthesia: General    Procedure:   Right prepatellar bursa incision and drainage    Specimen: None    Procedure Details: After the procedure had been explained to the patient's wife including the risks benefits and possible complications the patient's white gave telephone consent. Patient was then taken to the operating room placed on the operating table in a supine position. After satisfactory time-out procedure had been performed and the patient had been confirmed to be on antibiotics which was the vancomycin that  hewas given earlier, patient's right leg was prepped and draped in the usual sterile fashion. After a 2nd time-out  A longitudinal incision was made directly over the anterior aspect of the prepatellar bursa. Dissection was then down to the bursa which was opened. Irrigation was done with 1 L saline. Thorough debridement was then done using sharp and blunt dissection. The entirety of the bursa sac was carefully removed. Irrigation was then done again with another L saline a 2nd pass to was done to remove any remaining bursa tissue miss the 1st time. 1/3 L of saline was then passed through the bursa to ensure that all infection was removed. A quarter-inch Penrose drain was then placed at the base of the wound and the incision was closed with interrupted 3 O nylon sutures. Sterile dressing was applied with a bulky dressing and an immobilizer. The pneumatic tourniquet was deflated. Sponge count and needle counts were correct.   Tracey webb the operating room       Estimated Blood Loss:  50 cc    Complications:  None    Signed: Spencer Duncan MD (9/1/2017 at 9:16 AM)

## 2017-09-01 NOTE — PROGRESS NOTES
Primary Nurse Ernie Claudio and Jo Pabon, RN performed a dual skin assessment on this patient Impairment noted- see wound doc flow sheet  Jarek score is 13

## 2017-09-01 NOTE — H&P
[address]   HISTORY AND PHYSICAL       Name:  Js Ojeda   MR#:  [id number]   :  1931   Account #:  [de-identified]        Date of Adm:  2017       CHIEF COMPLAINT: Right knee pain. HISTORY OF PRESENT ILLNESS: The patient is an 70-year-old male   with cognitive impairment, most likely secondary to dementia, history of   atrial fibrillation, bilateral lower extremity weakness, essential   hypertension, who presents today complaining of significant pain and   swelling in his right knee. The patient apparently lives at [facility]   and facility reported that the patient has arthritis and has \"pain always\"   in his right knee and left shoulder. In speaking to the patient I was not   able to get a good history secondary to dementia and no family   relatives are present at the bedside but the history was relied on the   ER note. Apparently the patient started having significant pain in his   right knee with redness, swelling and warmth to touch, and was found   to be mildly tachycardic en route with EMS and was asked to be   evaluated for the same. The patient was evaluated by Orthopedics,   who did a right knee aspiration and the patient was found to be septic   arthritic and the patient was requested to be admitted under the   hospitalist service. I was not notified by the ER physician regarding the   admission and there was a delay in evaluating the patient. Currently   the patient is resting in bed and denies any complaints or problems. Denies any headache, blurry vision, sore throat, trouble swallowing,   trouble with speech, any chest pain, shortness of breath, cough, fever,   chills, abdominal pain, constipation, diarrhea, urinary symptoms, focal   or generalized neurological weakness that is new, recent travel, sick   contacts, falls, injuries, trauma to the knee or any other concerns or   problems.  The patient denies any hematemesis, melena, hemoptysis. The patient was found to have a fever of 101.4 in the past.    PAST MEDICAL HISTORY: See above. HOME MEDICATIONS: Currently the patient, per chart, is on   1. Cyanocobalamine. 2. Acetaminophen. 3. Alfuzosin. 4. Aspirin 81 mg daily. 5. Colace 100 mg daily. 6. Lexapro 10 mg daily. 7. Metoprolol 25 mg daily. 8. Metamucil. SOCIAL HISTORY: Former smoker, used to smoke 1 pack per day. Occasional alcohol. Denies IV drug abuse. Lives at home. REVIEW OF SYSTEMS: All systems were reviewed and were found to   be essentially negative except for symptoms mentioned above. ALLERGIES   1. ERYTHROMYCIN. 2. PENICILLIN. PHYSICAL EXAMINATION   GENERAL: Alert x1, awake, pleasantly confused male, appears to be   stated age. HEENT: Pupils equal and reactive to light. Dry mucous membranes. Tympanic membranes clear. NECK: Supple. CHEST: Clear to auscultation bilaterally. CORONARY: S1, S2 were heard. ABDOMEN: Soft, nontender, nondistended. Bowel sounds are   physiological.   EXTREMITIES: No clubbing, no cyanosis. Right knee in bandage and   brace. Left leg shows no edema. NEUROPSYCHIATRIC: Pleasant mood and affect. Very limited exam   secondary to patient noncompliance. DTR 2+/3. SKIN: Warm. VITAL SIGNS: Temperature 101.4, pulse 86, respiratory rate 19, blood   pressure , pulse oximetry 96% on room air. LABORATORY DATA: White count 11.5, hemoglobin 13.4, hematocrit   40, platelets 678,080. Sodium 125, potassium 3.6, chloride 100,   bicarbonate 27, anion gap 8, glucose 127, BUN 15, creatinine 1.01,   calcium 10.3, bilirubin total 0.7, ALT , AST 16, alkaline phosphatase   88. Lactic acid 1.9. Uric acid 3.7. Troponin less than 0.04. C-reactive   protein  Bursal fluid shows RBC count of greater than 100, WBC   count of 21,950, no crystals. IMAGING: X-ray of the knee shows osteoarthrosis with anterior soft   tissue swelling.      X-ray of the left shoulder shows severe degenerative changes. EKG: EKG shows normal sinus rhythm with sinus arrhythmia. ASSESSMENT AND PLAN   1. Septic arthritis. The patient will be admitted to the hospital. We will   start the patient on broad-spectrum IV antibiotics, IV fluids, pain control   and continue to monitor. We will follow along with Orthopedics. We will   get a Doppler of the lower extremity to rule out deep vein thrombosis   (DVT). We will provide elevation and partial weightbearing and further   intervention will be per hospital course. We will reassess as needed. Continue to monitor. Blood cultures were not drawn in the ER, which   has been ordered. We will closely monitor and reassess as needed. 2. History of paroxysmal atrial fibrillation, currently in sinus rhythm. We   will continue to monitor. Continue home medications. 3. Hypertension. Continue home medications and continue to monitor. 4. History of dementia. The patient is on fall precautions. Continue   home medications. 5. Gastrointestinal and deep vein thrombosis prophylaxis. The patient   will be on heparin.         Keisha Ruvalcaba MD MM / Ginny.Bekah   D:  08/31/2017   19:19   T:  08/31/2017   20:04   Job #:  [id number]

## 2017-09-01 NOTE — ANESTHESIA POSTPROCEDURE EVALUATION
Post-Anesthesia Evaluation and Assessment    Patient: Murray Everett MRN: [id number]  SSN: [id number]    YOB: 1931  Age: 80 y.o. Sex: male       Cardiovascular Function/Vital Signs  Visit Vitals    /69    Pulse 75    Temp 36.8 °C (98.3 °F)    Resp 16    Ht 6' 1.5\" (1.867 m)    Wt 64.4 kg (142 lb)    SpO2 95%    BMI 18.48 kg/m2       Patient is status post general anesthesia for Procedure(s):  INCISION AND DRAINAGE PREPATELLAR BURSA  RIGHT KNEE. Nausea/Vomiting: None    Postoperative hydration reviewed and adequate. Pain:  Pain Scale 1:  (pt moaning. medicated) (09/01/17 0915)  Pain Intensity 1: 0 (09/01/17 0721)   Managed    Neurological Status:   Neuro (WDL):  (dementia history) (09/01/17 0954)   At baseline    Mental Status and Level of Consciousness: Arousable    Pulmonary Status:   O2 Device: Nasal cannula (09/01/17 0954)   Adequate oxygenation and airway patent    Complications related to anesthesia: None    Post-anesthesia assessment completed.  No concerns    Signed By: Colleen Lee MD     September 1, 2017

## 2017-09-01 NOTE — PERIOP NOTES
TRANSFER - OUT REPORT:    Verbal report given to Vincent Mardeisy (name) on Le Saver  being transferred to [facility] (unit) for routine post - op       Report consisted of patients Situation, Background, Assessment and   Recommendations(SBAR). Time Pre op antibiotic given:given on floor  Anesthesia Stop time: 0848  Souza Present on Transfer to floor:n  Order for Souza on Chart:n    Information from the following report(s) SBAR, OR Summary, Intake/Output, MAR and Accordion was reviewed with the receiving nurse. Opportunity for questions and clarification was provided. Is the patient on 02? YES       L/Min 1       Other     Is the patient on a monitor? NO    Is the nurse transporting with the patient? NO    Surgical Waiting Area notified of patient's transfer from PACU? YES      The following personal items collected during your admission accompanied patient upon transfer:   Dental Appliance: Dental Appliances: None  Vision: Visual Aid: Glasses  Hearing Aid:    Jewelry: Jewelry: None  Clothing: Clothing: None  Other Valuables:  Other Valuables: None  Valuables sent to safe:

## 2017-09-01 NOTE — ROUTINE PROCESS
TRANSFER - OUT REPORT:    Verbal report given to Marcell Hennessy RN *(name) on Sandro Herbret  being transferred to  (unit) for routine progression of care       Report consisted of patients Situation, Background, Assessment and   Recommendations(SBAR). Information from the following report(s) SBAR, ED Summary, [facility] and Recent Results was reviewed with the receiving nurse. Lines:   Peripheral IV 04/14/16 Left Hand (Active)       Peripheral IV 08/01/17 Right Wrist (Active)       Peripheral IV 08/31/17 Left Antecubital (Active)        Opportunity for questions and clarification was provided.       Patient transported with:  Ck

## 2017-09-01 NOTE — PROGRESS NOTES
Primary Nurse Ana Rodriguez and ZANE Nieves performed a dual skin assessment on this patient No impairment noted  Jarek score is 20

## 2017-09-01 NOTE — PROGRESS NOTES
Paged hospitalist  [phone] - Page returned from hospitalist, x1 Blood culture + x1 Lactic Acid

## 2017-09-01 NOTE — ANESTHESIA PREPROCEDURE EVALUATION
Anesthetic History   No history of anesthetic complications            Review of Systems / Medical History  Patient summary reviewed, nursing notes reviewed and pertinent labs reviewed    Pulmonary  Within defined limits                 Neuro/Psych   Within defined limits           Cardiovascular    Hypertension: well controlled          CAD         GI/Hepatic/Renal     GERD           Endo/Other  Within defined limits           Other Findings            Physical Exam    Airway  Mallampati: II  TM Distance: > 6 cm  Neck ROM: normal range of motion   Mouth opening: Normal     Cardiovascular  Regular rate and rhythm,  S1 and S2 normal,  no murmur, click, rub, or gallop             Dental  No notable dental hx       Pulmonary  Breath sounds clear to auscultation               Abdominal  GI exam deferred       Other Findings            Anesthetic Plan    ASA: 2  Anesthesia type: general          Induction: Intravenous  Anesthetic plan and risks discussed with: Patient

## 2017-09-01 NOTE — CDMP QUERY
Dear Hospitalists,    Patient is noted to have a BMI of 18.48. Please clarify if this patient is:     =>Underweight  =>Cachexia  =>Failure to Thrive  =>Other Explanation of clinical findings  =>Unable to Determine (no explanation of clinical findings)    The medical record reflects the following clinical findings, treatment, and risk factors:    Presentation- BMI 18.48, Ht: 6' 1.5\" (1.867 m), Wt: 64.4 kg (142 lb)      Please clarify and document your clinical opinion in the progress notes and discharge summary including the definitive and/or presumptive diagnosis, (suspected or probable), related to the above clinical findings. Please include clinical findings supporting your diagnosis.     Thank You  Alfredo Vazquez,MSN,BSN,RN,[facility]  [phone]

## 2017-09-01 NOTE — PROGRESS NOTES
IMPRESSION    1. Septic prepatellar bursitis    2. Severe cognitive impairment     3. History of atrial fibrillation     4. HTN    PLAN    1. Continue vancomycin for now. Ff up cultures.

## 2017-09-01 NOTE — PROGRESS NOTES
Pharmacist Note - Vancomycin Dosing    Consult provided for this 80 y.o. male for indication of septic arthritis . Antibiotic regimen(s): vancomycin    Recent Labs      17   1156   WBC  11.5*   CREA  1.01   BUN  15     Frequency of BMP: daily  Height: 186.7 cm  Weight: 100.7kg  Est CrCl: ~70 ml/min; Temp (24hrs), Av.1 °F (37.8 °C), Min:98.6 °F (37 °C), Max:101.4 °F (38.6 °C)    Cultures:: blood: in process  : bursal fluid: 4+ gram positive cocci in clusters    Goal trough = 15 - 20 mcg/mL    Therapy will be initiated with a loading dose of 2000 mg IV x 1 to be followed by a maintenance dose of 1500 mg IV every 16 hours. Pharmacy to follow patient daily and order levels / make dose adjustments as appropriate.

## 2017-09-01 NOTE — PROCEDURES
[facility]  *** FINAL REPORT ***    Name: Ernie Ureña  MRN: [id number]    Inpatient  : 05 Dec 1931  HIS Order #: [id number]  [address] [facility] Visit #: [id number]  Date: 31 Aug 2017    TYPE OF TEST: Peripheral Venous Testing    REASON FOR TEST  R/O DVT    Right Leg:-  Deep venous thrombosis:           No  Superficial venous thrombosis:    No  Deep venous insufficiency:        Not examined  Superficial venous insufficiency: Not examined      INTERPRETATION/FINDINGS  PROCEDURE:  Color duplex ultrasound imaging of lower extremity veins. FINDINGS:       Right: The common femoral, deep femoral, femoral, popliteal,  posterior tibial, peroneal, and great saphenous are patent and without   evidence of thrombus; each is is fully compressible and there is no  narrowing of the flow channel on color Doppler imaging. Phasic flow  is observed in the common femoral vein. Left:   The common femoral vein is patent and without evidence of   thrombus. Phasic flow is observed. This extremity was not otherwise   evaluated. IMPRESSION:  No evidence of right lower extremity vein thrombosis. There is an incidental finding of a prominent right groin lymph node. ADDITIONAL COMMENTS    I have personally reviewed the data relevant to the interpretation of  this  study. TECHNOLOGIST: Garth Westfall RVT  Signed: 2017 09:36 PM    PHYSICIAN: Kadie Mariano MD  Signed: 2017 05:12 PM

## 2017-09-01 NOTE — ED NOTES
Pt sitting up in stretcher. Eating dinner. PIV fluids infusing. Paired culture discontinued and replaced with single culture order that was sent on hold upon arrival to ED as 2 different antibiotics have already been administered. Ace bandage and knee immobilizer placed by williams Mahmood earlier today remains in place. Knee elevated for comfort.

## 2017-09-01 NOTE — CONSULTS
CONSULT NOTE    Subjective:     Date of Consultation:  September 1, 2017  Referring Physician:  James Dave    The patient is an 80-year-old male with  dementia, history of atrial fibrillation, bilateral lower extremity weakness, essential hypertension, who presents today complaining of significant pain and swelling in his right knee. The patient apparently lives at [facility] and facility reported that the patient has arthritis and has \"pain always\" in his right knee and left shoulder. Patient history difficult due to secondary to dementia and no family relatives are present at the bedside but the history was relied on the ER note. Apparently the patient started having significant pain in his right knee with redness, swelling and warmth to touch, and was found to be mildly tachycardic en route with EMS. The patient was evaluated by and had a right knee prepatellar bursa aspiration and found to have angella pus in the bursa. Although a poor historian, he denies any headache, blurry vision, sore throat, trouble swallowing, trouble with speech, any chest pain, shortness of breath, cough, fever, chills, abdominal pain, constipation, diarrhea, urinary symptoms, focal or generalized neurological weakness that is new, recent travel, sick contacts, falls, injuries, trauma to the knee or any other concerns or problems. The patient denies any hematemesis, melena, hemoptysis. The patient was found to have a fever of 101.4.     Patient Active Problem List    Diagnosis Date Noted    Arthritis, septic, knee ([facility] [address] 08/31/2017    Altered mental status 08/01/2017    Bradycardia 04/27/2016    S/P laparoscopic cholecystectomy 04/25/2016    Dementia 03/02/2015    Vasovagal syncope 12/31/2014    S/P placement of cardiac pacemaker 12/29/2014    CAD (coronary artery disease) 10/10/2014    Atrial fibrillation ([facility] [address] 10/10/2014    Aortic stenosis 10/10/2014    Vitamin D deficiency 02/24/2014    Nocturia 05/28/2013    Chronic left shoulder pain 05/11/2012    Tachycardia-bradycardia syndrome ([facility] [address] 04/12/2012    Sick sinus syndrome (HCC) 04/12/2012    Constipation 12/06/2011    RLS (restless legs syndrome) 06/14/2011    Arthritis 12/30/2010    GERD (gastroesophageal reflux disease) 12/30/2010    Urine incontinence 10/28/2010    Essential hypertension 02/18/2010    Pure hypercholesterolemia 02/18/2010    Hyperglycemia 02/18/2010     Family History   Problem Relation Age of Onset    Stroke Father     Dementia Father     Heart Disease Mother     Headache Daughter     Other Sister      fibromyalgia    Arthritis-osteo Brother     Other Brother      stomach      Social History   Substance Use Topics    Smoking status: Former Smoker     Packs/day: 1.00     Years: 30.00     Types: Cigarettes     Quit date: 1/1/1982    Smokeless tobacco: Never Used    Alcohol use No     Past Medical History:   Diagnosis Date    Adhesive capsulitis of shoulder      Ebb Potash notes-injected 3/17/16    Advance directive in chart 12/31/14    SEE SCANNED Form    Arrhythmia     Atrial fibrillation ([facility] [address]     BPH     [facility] T[facility] Frazier    Cancer [facility]) 10/2010    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 Madera 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    Constipation     Contact dermatitis and other eczema, due to unspecified cause     ak    Dementia     7/27/15 copy of neuropsych eval    Diverticular disease     dr Josh Yu DJD of shoulder     dr Flor Kumar  7/22/15 notes    ED (erectile dysfunction)     [facility]    F[facility] infection of nail [id number]    [facility]    Gallbladder calculus without cholecystitis 4/12/2016    GERD (gastroesophageal reflux disease)     reflux dr Melinda mann    Headache     migraines    Hearing loss     Hip fracture, right ([facility] [address]     Hypertension     Insomnia     Left knee pain 5/27/14 7/28/16    patellofemoral arthrosis of right knee  1/26/17 f/u    Left shoulder pain 3/31/14  1/7/16    [facility] 5/5/17    Nosebleed     6/8/17 note from ENT Giovanni Truong May    OAB (overactive bladder)     [facility]    Osteopenia 3/2007    PAC (premature atrial contraction)     Pacemaker     7/29/15 note about pacer check    Panic attacks     PAT (paroxysmal atrial tachycardia) (HCC)     Peripheral neuropathy (HCC)     PVC (premature ventricular contraction)     S/P laparoscopic cholecystectomy 4/25/2016    Shingles 9/2010    dr Karen Nayak Imam Tinnitus     Varicocele     dr Merline Brave      Past Surgical History:   Procedure Laterality Date    EGD      HX APPENDECTOMY  11/2008    dr ashlee Rasheed GI      colonoscopy    HX HEART CATHETERIZATION  4/6/2012    mild to moderate CAD in LAD and OM, RCA, less than 50% lesions    HX HEENT      cataract left    HX LAP CHOLECYSTECTOMY  4/13/16    HX ORTHOPAEDIC      rt femur fx.  HX PACEMAKER      INS PPM/ICD LED DUAL ONLY  4/26/2012         IR INSERT PACEMAKER FLUORO GUIDE  4/26/2012         IA COLONOSCOPY FLX DX W/COLLJ SPEC WHEN PFRMD  1/27/2012         IA EGD TRANSORAL BIOPSY SINGLE/MULTIPLE  1/27/2012           Prior to Admission medications    Medication Sig Start Date End Date Taking? Authorizing Provider   cyanocobalamin 1,000 mcg tablet Take 1 Tab by mouth daily. Indications: PREVENTION OF VITAMIN B12 DEFICIENCY 8/4/17  Yes Karlos Jane MD   acetaminophen (TYLENOL) 325 mg tablet Take 650 mg by mouth two (2) times daily as needed for Pain. Yes Historical Provider   alfuzosin SR (UROXATRAL) 10 mg SR tablet Take 10 mg by mouth every evening. Yes Historical Provider   aspirin delayed-release 81 mg tablet Take 81 mg by mouth daily. Yes Historical Provider   cholecalciferol (VITAMIN D3) 1,000 unit tablet Take 2,000 Units by mouth daily. Yes Historical Provider   docusate sodium (COLACE) 100 mg capsule Take 100 mg by mouth two (2) times a day.    Yes Historical Provider   escitalopram oxalate (LEXAPRO) 10 mg tablet Take 10 mg by mouth every morning. Yes Historical Provider   metoprolol succinate (TOPROL-XL) 25 mg XL tablet Take 25 mg by mouth daily. Yes Historical Provider   psyllium (METAMUCIL) packet Take 1 Packet by mouth daily. Yes Historical Provider     Current Facility-Administered Medications   Medication Dose Route Frequency    tamsulosin (FLOMAX) capsule 0.4 mg  0.4 mg Oral QHS    aspirin delayed-release tablet 81 mg  81 mg Oral DAILY    docusate sodium (COLACE) capsule 100 mg  100 mg Oral BID    escitalopram oxalate (LEXAPRO) tablet 10 mg  10 mg Oral 7am    metoprolol succinate (TOPROL-XL) XL tablet 25 mg  25 mg Oral DAILY    sodium chloride (NS) flush 5-10 mL  5-10 mL IntraVENous Q8H    sodium chloride (NS) flush 5-10 mL  5-10 mL IntraVENous PRN    0.9% sodium chloride infusion  75 mL/hr IntraVENous CONTINUOUS    aztreonam (AZACTAM) 1 g in 0.9% sodium chloride (MBP/ADV) 100 mL  1 g IntraVENous Q8H    acetaminophen (TYLENOL) tablet 650 mg  650 mg Oral Q4H PRN    heparin (porcine) injection 5,000 Units  5,000 Units SubCUTAneous Q12H    0.9% sodium chloride infusion  75 mL/hr IntraVENous CONTINUOUS    Vancomycin pharmacy to dose   Other Rx Dosing/Monitoring    vancomycin (VANCOCIN) 1500 mg in  ml infusion  1,500 mg IntraVENous Q16H      Allergies   Allergen Reactions    Erythromycin Nausea Only    Pcn [Penicillins] Unknown (comments)        Review of Systems:  A comprehensive review of systems was negative except for that written in the HPI.     Mental Status: severe dementia    Objective:     Patient Vitals for the past 8 hrs:   BP Temp Pulse Resp SpO2   17 0421 154/74 98.5 °F (36.9 °C) 88 18 91 %   17 2333 100/62 98.5 °F (36.9 °C) 84 16 95 %     Temp (24hrs), Av.4 °F (37.4 °C), Min:98.2 °F (36.8 °C), Max:101.4 °F (38.6 °C)      Physical Exam:  General appearance: alert, cooperative, combative, mild distress, slowed mentation, appears stated age, confused, disoriented  Lungs: clear to auscultation bilaterally  Heart: regular rate and rhythm  Abdomen: soft, non-tender. Bowel sounds normal. No masses,  no organomegaly  Extremities: Right knee swollen and erythematous with evidence of purulence in prepatellar bursa. Two small areas of thinned skin on either side of the bursa. Minimal drainage from aspiration site. Otherwise extremities normal, atraumatic, no cyanosis or edema   Pulses: 2+ and symmetric  Neurologic: Grossly normal      Imaging Review: swelling anterior to right knee     Labs:   Recent Results (from the past 24 hour(s))   CBC WITH AUTOMATED DIFF    Collection Time: 08/31/17 11:56 AM   Result Value Ref Range    WBC 11.5 (H) 4.1 - 11.1 K/uL    RBC 4.54 4.10 - 5.70 M/uL    HGB 13.4 12.1 - 17.0 g/dL    HCT 40.0 36.6 - 50.3 %    MCV 88.1 80.0 - 99.0 FL    MCH 29.5 26.0 - 34.0 PG    MCHC 33.5 30.0 - 36.5 g/dL    RDW 12.6 11.5 - 14.5 %    PLATELET 646 963 - 269 K/uL    NEUTROPHILS 79 (H) 32 - 75 %    LYMPHOCYTES 10 (L) 12 - 49 %    MONOCYTES 11 5 - 13 %    EOSINOPHILS 0 0 - 7 %    BASOPHILS 0 0 - 1 %    ABS. NEUTROPHILS 9.1 (H) 1.8 - 8.0 K/UL    ABS. LYMPHOCYTES 1.1 0.8 - 3.5 K/UL    ABS. MONOCYTES 1.2 (H) 0.0 - 1.0 K/UL    ABS. EOSINOPHILS 0.0 0.0 - 0.4 K/UL    ABS. BASOPHILS 0.0 0.0 - 0.1 K/UL   METABOLIC PANEL, COMPREHENSIVE    Collection Time: 08/31/17 11:56 AM   Result Value Ref Range    Sodium 135 (L) 136 - 145 mmol/L    Potassium 3.6 3.5 - 5.1 mmol/L    Chloride 100 97 - 108 mmol/L    CO2 27 21 - 32 mmol/L    Anion gap 8 5 - 15 mmol/L    Glucose 127 (H) 65 - 100 mg/dL    BUN 15 6 - 20 MG/DL    Creatinine 1.01 0.70 - 1.30 MG/DL    BUN/Creatinine ratio 15 12 - 20      GFR est AA >60 >60 ml/min/1.73m2    GFR est non-AA >60 >60 ml/min/1.73m2    Calcium 10.3 (H) 8.5 - 10.1 MG/DL    Bilirubin, total 0.7 0.2 - 1.0 MG/DL    ALT (SGPT) 27 12 - 78 U/L    AST (SGOT) 16 15 - 37 U/L    Alk.  phosphatase 88 45 - 117 U/L    Protein, total 8.2 6.4 - 8.2 g/dL    Albumin 3.2 (L) 3.5 - 5.0 g/dL    Globulin 5.0 (H) 2.0 - 4.0 g/dL    A-G Ratio 0.6 (L) 1.1 - 2.2     TROPONIN I    Collection Time: 08/31/17 11:56 AM   Result Value Ref Range    Troponin-I, Qt. <0.04 <0.05 ng/mL   SED RATE (ESR)    Collection Time: 08/31/17 11:56 AM   Result Value Ref Range    Sed rate, automated 57 (H) 0 - 20 mm/hr   C REACTIVE PROTEIN, QT    Collection Time: 08/31/17 11:56 AM   Result Value Ref Range    C-Reactive protein 14.80 (H) 0.00 - 0.60 mg/dL   URIC ACID    Collection Time: 08/31/17 11:56 AM   Result Value Ref Range    Uric acid 3.7 3.5 - 7.2 MG/DL   CULTURE, BLOOD    Collection Time: 08/31/17 11:56 AM   Result Value Ref Range    Special Requests: NO SPECIAL REQUESTS      Culture result: NO GROWTH AFTER 8 HOURS     LACTIC ACID    Collection Time: 08/31/17 11:57 AM   Result Value Ref Range    Lactic acid 1.9 0.4 - 2.0 MMOL/L   EKG, 12 LEAD, INITIAL    Collection Time: 08/31/17 12:02 PM   Result Value Ref Range    Ventricular Rate 85 BPM    Atrial Rate 85 BPM    P-R Interval 168 ms    QRS Duration 78 ms    Q-T Interval 342 ms    QTC Calculation (Bezet) 406 ms    Calculated P Axis 46 degrees    Calculated R Axis 31 degrees    Calculated T Axis 41 degrees    Diagnosis       Normal sinus rhythm with sinus arrhythmia or PAC's and intermittent atrial   pacing  Nonspecific ST abnormality  When compared with ECG of 01-AUG-2017 11:24,  Sinus rhythm has replaced Electronic atrial pacemaker  Confirmed by Ascencion Connor MD, [employer] ([id number]) on 8/31/2017 4:17:25 PM     CELL COUNT, BODY FLUID    Collection Time: 08/31/17  2:04 PM   Result Value Ref Range    BODY FLUID TYPE BURSAL FLUID      FLUID COLOR RED      FLUID APPEARANCE BLOODY      FLUID RBC COUNT >100 /cu mm    FLUID WBC COUNT 60666 (H) 0 - 5 /cu mm   CRYSTALS, SYNOVIAL FLUID    Collection Time: 08/31/17  2:04 PM   Result Value Ref Range    FLUID TYPE(7) BURSAL FLUID      Crystals, body fluid NO CRYSTALS SEEN WITH POLARIZED LIGHT     CULTURE, BODY FLUID W GRAM STAIN Collection Time: 08/31/17  2:04 PM   Result Value Ref Range    Special Requests: NO SPECIAL REQUESTS      GRAM STAIN 4+ WBCS SEEN      GRAM STAIN 4+ GRAM POSITIVE COCCI IN CLUSTERS      Culture result: PENDING    POC EG7    Collection Time: 08/31/17  7:25 PM   Result Value Ref Range    Calcium, ionized (POC) 1.32 1.12 - 1.32 mmol/L    pH (POC) 7.476 (H) 7.35 - 7.45      pCO2 (POC) 38.3 35.0 - 45.0 MMHG    pO2 (POC) 31 (LL) 80 - 100 MMHG    HCO3 (POC) 28.2 (H) 22 - 26 MMOL/L    Base excess (POC) 5 mmol/L    sO2 (POC) 65 (L) 92 - 97 %    Site OTHER      Device: ROOM AIR      Allens test (POC) N/A      Specimen type (POC) VENOUS BLOOD     LACTIC ACID    Collection Time: 08/31/17 10:23 PM   Result Value Ref Range    Lactic acid 0.7 0.4 - 2.0 MMOL/L   METABOLIC PANEL, BASIC    Collection Time: 09/01/17  4:00 AM   Result Value Ref Range    Sodium 135 (L) 136 - 145 mmol/L    Potassium 3.6 3.5 - 5.1 mmol/L    Chloride 102 97 - 108 mmol/L    CO2 23 21 - 32 mmol/L    Anion gap 10 5 - 15 mmol/L    Glucose 126 (H) 65 - 100 mg/dL    BUN 14 6 - 20 MG/DL    Creatinine 0.86 0.70 - 1.30 MG/DL    BUN/Creatinine ratio 16 12 - 20      GFR est AA >60 >60 ml/min/1.73m2    GFR est non-AA >60 >60 ml/min/1.73m2    Calcium 9.8 8.5 - 10.1 MG/DL   CBC WITH AUTOMATED DIFF    Collection Time: 09/01/17  4:00 AM   Result Value Ref Range    WBC 9.7 4.1 - 11.1 K/uL    RBC 3.95 (L) 4.10 - 5.70 M/uL    HGB 11.5 (L) 12.1 - 17.0 g/dL    HCT 34.4 (L) 36.6 - 50.3 %    MCV 87.1 80.0 - 99.0 FL    MCH 29.1 26.0 - 34.0 PG    MCHC 33.4 30.0 - 36.5 g/dL    RDW 12.6 11.5 - 14.5 %    PLATELET 957 579 - 488 K/uL    NEUTROPHILS 73 32 - 75 %    LYMPHOCYTES 15 12 - 49 %    MONOCYTES 11 5 - 13 %    EOSINOPHILS 1 0 - 7 %    BASOPHILS 0 0 - 1 %    ABS. NEUTROPHILS 7.1 1.8 - 8.0 K/UL    ABS. LYMPHOCYTES 1.4 0.8 - 3.5 K/UL    ABS. MONOCYTES 1.0 0.0 - 1.0 K/UL    ABS. EOSINOPHILS 0.1 0.0 - 0.4 K/UL    ABS.  BASOPHILS 0.0 0.0 - 0.1 K/UL         Impression:     Patient Active Problem List    Diagnosis Date Noted    Arthritis, septic, knee ([facility] [address] 08/31/2017    Altered mental status 08/01/2017    Bradycardia 04/27/2016    S/P laparoscopic cholecystectomy 04/25/2016    Dementia 03/02/2015    Vasovagal syncope 12/31/2014    S/P placement of cardiac pacemaker 12/29/2014    CAD (coronary artery disease) 10/10/2014    Atrial fibrillation ([facility] [address] 10/10/2014    Aortic stenosis 10/10/2014    Vitamin D deficiency 02/24/2014    Nocturia 05/28/2013    Chronic left shoulder pain 05/11/2012    Tachycardia-bradycardia syndrome ([facility] [address] 04/12/2012    Sick sinus syndrome (HCC) 04/12/2012    Constipation 12/06/2011    RLS (restless legs syndrome) 06/14/2011    Arthritis 12/30/2010    GERD (gastroesophageal reflux disease) 12/30/2010    Urine incontinence 10/28/2010    Essential hypertension 02/18/2010    Pure hypercholesterolemia 02/18/2010    Hyperglycemia 02/18/2010     Principal Problem:    Arthritis, septic, knee ([facility] [address] (8/31/2017)        Plan:   Infected prepatellar bursa. Has gross pus in bursa. Needs I&D. Will plan to OR this am  Discussed with wife and she consents as he has dementia and is unable to make medical decisions. NPO  Consent on Chart.       Spencer(Sahara) Deshaun Pearce MD

## 2017-09-01 NOTE — PROGRESS NOTES
.. TRANSFER - IN REPORT:    Verbal report received from Sana(name) on Anabel Patiño  being received from ED(unit) for routine progression of care      Report consisted of patients Situation, Background, Assessment and   Recommendations(SBAR). Information from the following report(s) SBAR was reviewed with the receiving nurse. Opportunity for questions and clarification was provided. Assessment completed upon patients arrival to unit and care assumed.          Report given to primary nurse Silvia Alamo

## 2017-09-01 NOTE — ROUTINE PROCESS
TRANSFER - IN REPORT:    Verbal report received from [facility] (name) on Nehal Seals  being received from [address] [place] [address] (unit) for routine progression of care      Report consisted of patients Situation, Background, Assessment and   Recommendations(SBAR). Information from the following report(s) SBAR, Kardex, Intake/Output, MAR, Accordion and Recent Results was reviewed with the receiving nurse. Opportunity for questions and clarification was provided. Assessment completed upon patients arrival to unit and care assumed.

## 2017-09-02 NOTE — ROUTINE PROCESS
Bedside shift change report given to Stoney WongRN (oncoming nurse) by Dustin Morgan RN(offgoing nurse). Report given with SBAR.

## 2017-09-02 NOTE — PROGRESS NOTES
Bedside shift change report given to Rajinder (oncoming nurse) by Kenneth Liu (offgoing nurse). Report included the following information SBAR, Procedure Summary, Intake/Output and MAR.

## 2017-09-02 NOTE — CONSULTS
[facility] [address]    [facility]       Name:  Alex Pandey   MR#:  [id number]   :  1931   Account #:  [de-identified]    Date of Consultation:  2017   Date of Adm:  2017       REQUESTING PHYSICIAN: Dr. Ernie Aguirre: Infected right knee. HISTORY OF PRESENT ILLNESS: The patient is an 80-year-old man   whose medical history is significant for severe cognitive impairment. He cannot really give me a history and cannot relate to me the reason   for his admission. The history is thus taken from his wife and son as   well as the medical record. He lives in an assisted living facility. On   Monday, he stumbled and scraped his right knee. There was no open   wound, but his wife said that later that day, the right knee became red   and warm. Over the next days, it became more swollen and for this   reason, he was eventually sent to the emergency room. Orthopedics   was consulted and they did an aspirate. It showed 71,950 white blood   cells with 97% neutrophils. No crystals were seen. He was then   brought to the operating room where Dr. Randolph Mckay performed an   incision and drainage. Dr. Tracy Pham notes that the infection was only   contained in the bursa. The right knee joint itself was not involved. His   aspirate cultures are growing Staphylococcus aureus. He is currently   on vancomycin and we are being asked to see him in consult. ALLERGIES:   1. ERYTHROMYCIN. 2. PENICILLIN, BUT HE CANNOT TELL ME THE REACTION. CURRENT MEDICATIONS:   1. Aspirin. 2. Colace. 3. Lexapro. 4. Toprol. 5. Flomax. 6. Vancomycin. REVIEW OF SYSTEMS: Unobtainable as the patient does not answer   questions. PAST MEDICAL HISTORY:   1. Atrial fibrillation. 2. BPH. 3. Basal cell cancer. 4. Renal cell cancer. 5. Cognitive impairment. 6. History of erectile dysfunction. 7. Cholelithiasis. 8. Reflux. 9. Hypertension.    10. Peripheral neuropathy. 11. History of shingles. PAST SURGICAL HISTORY:   1. EGD. 2. Appendectomy. 3. Colonoscopy. 4. Heart catheterization. 5. Cataract surgery. 6. Laparoscopic cholecystectomy. 7. Right femur fracture. 8. History of pacemaker placement. FAMILY HISTORY: His father had a stroke. SOCIAL HISTORY: He is a former smoker; does not drink alcohol or   engage in recreational drug use. PHYSICAL EXAMINATION   GENERAL: He is not in respiratory distress. VITAL SIGNS: Temperature is 100.1, pulse 83, blood pressure 155/80,   respirations 16, saturating at 97%. HEAD AND NECK: He has pink conjunctivae, anicteric sclerae. No   JVD. No cervical lymphadenopathy. External ears are normal.   LUNGS: Clear to auscultation. No rales, wheezes, or rhonchi. HEART: No murmurs, rubs, or clicks. ABDOMEN: Soft, nontender. No guarding, no rebound. GENITOURINARY: No flank tenderness. MUSCULOSKELETAL: The right knee is bandaged. PSYCHIATRIC: He cannot answer any questions. INTEGUMENT: I did not notice any rash. NEUROLOGIC: He does not obey any commands. LABORATORY DATA: White blood count 9.7, creatinine 0.86, AST 16,   ALT 27, alkaline phosphatase 88, total bilirubin 0.7. CRP is   14.8. Surgical culture growing Staphylococcus aureus. IMPRESSION:   1. Septic prepatellar bursitis. 2. Severe cognitive impairment. 3. History of atrial fibrillation. 4. Hypertension. RECOMMENDATIONS: I would continue vancomycin now pending   cultures. Thank you for this consult.         MD Elysia Arce / [place] [facility]   D:  09/01/2017   19:45   T:  09/01/2017   20:10   Job #:  [id number]

## 2017-09-02 NOTE — PROGRESS NOTES
ID Progress Note  2017    Subjective:     Febrile yesterday. Patient more alert and has no complaints. Objective:     Vitals:   Visit Vitals    /74 (BP 1 Location: Right arm, BP Patient Position: At rest)    Pulse 72    Temp 98.6 °F (37 °C)    Resp 16    Ht 6' 1.5\" (1.867 m)    Wt 89.2 kg (196 lb 9.6 oz)    SpO2 95%    BMI 25.59 kg/m2        Tmax:  Temp (24hrs), Av.4 °F (37.4 °C), Min:98 °F (36.7 °C), Max:101.4 °F (38.6 °C)      Exam:    Not in distress  Lungs: clear to auscultation  Heart: s1, s2, no murmurs   Abdomen: soft, nontender  Extremities: right lower leg bandaged     Labs:   Lab Results   Component Value Date/Time    WBC 6.3 2017 02:33 AM    HGB 11.5 2017 02:33 AM    HCT 34.9 2017 02:33 AM    PLATELET 946  02:33 AM    MCV 87.3 2017 02:33 AM     Lab Results   Component Value Date/Time    Sodium 136 2017 02:33 AM    Potassium 3.7 2017 02:33 AM    Chloride 102 2017 02:33 AM    CO2 27 2017 02:33 AM    Anion gap 7 2017 02:33 AM    Glucose 105 2017 02:33 AM    BUN 10 2017 02:33 AM    Creatinine 0.78 2017 02:33 AM    BUN/Creatinine ratio 13 2017 02:33 AM    GFR est AA >60 2017 02:33 AM    GFR est non-AA >60 2017 02:33 AM    Calcium 9.0 2017 02:33 AM    Bilirubin, total 0.7 2017 11:56 AM    AST (SGOT) 16 2017 11:56 AM    Alk. phosphatase 88 2017 11:56 AM    Protein, total 8.2 2017 11:56 AM    Albumin 3.2 2017 11:56 AM    Globulin 5.0 2017 11:56 AM    A-G Ratio 0.6 2017 11:56 AM    ALT (SGPT) 27 2017 11:56 AM       Assessment:     1. Septic prepatellar bursitis. 2. Severe cognitive impairment. 3. History of atrial fibrillation. 4. Hypertension. Recommendations:     I spoke with Dr. Abdirizak Gustafson yesterday. The right knee joint is not involved. He will likely not need IV abx. Need to clarify pcn allergy.   \\    Addendum: I called his pharmacy. He was a prescribed pencillin in 2015 and he tolerated it well. I will give him cefazolin.      Manolo Davis MD

## 2017-09-02 NOTE — PROGRESS NOTES
Hospitalist Progress Note  Byron Schmitz MD  Office: [phone]        Date of Service:  2017  NAME:  Kendra Orourke  :  1931  MRN:  [id number]      Admission Summary:   The patient is an 80-year-old male   with cognitive impairment, most likely secondary to dementia, history of   atrial fibrillation, bilateral lower extremity weakness, essential   hypertension, who presents today complaining of significant pain and   swelling in his right knee. The patient apparently lives at [facility]   and facility reported that the patient has arthritis and has \"pain always\"   in his right knee and left shoulder. In speaking to the patient I was not   able to get a good history secondary to dementia and no family   relatives are present at the bedside but the history was relied on the   ER note. Apparently the patient started having significant pain in his   right knee with redness, swelling and warmth to touch, and was found   to be mildly tachycardic en route with EMS and was asked to be   evaluated for the same. The patient was evaluated by Orthopedics,   who did a right knee aspiration and the patient was found to be septic   arthritic and the patient was requested to be admitted under the   hospitalist service. I was not notified by the ER physician regarding the   admission and there was a delay in evaluating the patient. Currently   the patient is resting in bed and denies any complaints or problems. Denies any headache, blurry vision, sore throat, trouble swallowing,   trouble with speech, any chest pain, shortness of breath, cough, fever,   chills, abdominal pain, constipation, diarrhea, urinary symptoms, focal   or generalized neurological weakness that is new, recent travel, sick   contacts, falls, injuries, trauma to the knee or any other concerns or   problems. The patient denies any hematemesis, melena, hemoptysis.    The patient was found to have a fever of 101.4 in the past.      Interval history / Subjective:   More alert and interactive. Assessment & Plan:   Septic right prepatellar bursitis,the knee joint is spared per surgeon  -He underwent I &D in the OR 9/1  -Fluid culture with MSSA,sensitive to FLQ as well  -Antibiotics switched to ancef. May discharge on oral abx,such as FLQ. Will discuss with ID  -Right LE venous doppler negative for DVT  -Spiked fever on 9/1,blood culture repeated,ngf. The blood culture from 8/10 remained negative. Lactate normal    History of paroxysmal atrial fibrillation  -Currently NSR. Continue lopressor    Hypertension. Stable on lopressor. .   Advanced dementia ,not able to recognize wife and children:lives at [facility],need 24/7 care giver  Body mass index is 25.59 kg/(m^2). Diet:cardiac  Code status: Wife and son both expressed he is DNR. Code status updated to reflect this. DVT prophylaxis: heparin  Care Plan discussed with: wife and son     Discharge planning/disposition:TBD    Hospital Problems  Date Reviewed: 8/31/2017          Codes Class Noted POA    * (Principal)Arthritis, septic, knee [facility]) ICD-10-CM: M00.9  ICD-9-CM: 711.06  8/31/2017 Unknown                Review of Systems:   Review of systems not obtained due to patient factors. Physical Examination:      Last 24hrs VS reviewed since prior progress note. Most recent are:  Visit Vitals    /74 (BP 1 Location: Right arm, BP Patient Position: At rest)    Pulse 72    Temp 98.6 °F (37 °C)    Resp 16    Ht 6' 1.5\" (1.867 m)    Wt 89.2 kg (196 lb 9.6 oz)    SpO2 95%    BMI 25.59 kg/m2           Constitutional:  alert and awake   Eyes: Non icteric,non pallor,no erythema,discharge,PERRLA   ENT:  Oral mucous moist, oropharynx benign. Neck supple,    Resp:  CTA bilaterally. No wheezing/rhonchi/rales. No accessory muscle use   CV:  Regular rhythm, normal rate, no murmurs, gallops, rubs    GI:  Soft, non distended, non tender.  normoactive bowel sounds, no hepatosplenomegaly    :  No CVA or suprapubic tenderness   Skin  :  No erythema,rash,bullae,dipigmentation     Musculoskeletal:  Right knee wrapped     Neurologic: Alert and awake,interactive. No intake or output data in the 24 hours ending 09/02/17 1437       Data Review:    Review and/or order of clinical lab test  Review and/or order of tests in the radiology section of CPT  Review and/or order of tests in the medicine section of CPT      Labs:     Recent Labs      09/02/17   0233  09/01/17   0400   WBC  6.3  9.7   HGB  11.5*  11.5*   HCT  34.9*  34.4*   PLT  237  261     Recent Labs      09/02/17   0233  09/01/17   0400  08/31/17   1156   NA  136  135*  135*   K  3.7  3.6  3.6   CL  102  102  100   CO2  27  23  27   BUN  10  14  15   CREA  0.78  0.86  1.01   GLU  105*  126*  127*   CA  9.0  9.8  10.3*   URICA   --    --   3.7     Recent Labs      08/31/17   1156   SGOT  16   ALT  27   AP  88   TBILI  0.7   TP  8.2   ALB  3.2*   GLOB  5.0*     No results for input(s): INR, PTP, APTT in the last 72 hours. No lab exists for component: INREXT, INREXT   No results for input(s): FE, TIBC, PSAT, FERR in the last 72 hours. Lab Results   Component Value Date/Time    Folate 15.4 05/12/2015 11:30 AM      No results for input(s): PH, PCO2, PO2 in the last 72 hours.   Recent Labs      08/31/17   1156   TROIQ  <0.04     Lab Results   Component Value Date/Time    Cholesterol, total 177 08/01/2017 11:35 AM    HDL Cholesterol 49 08/01/2017 11:35 AM    LDL, calculated 110 08/01/2017 11:35 AM    Triglyceride 90 08/01/2017 11:35 AM    CHOL/HDL Ratio 3.6 08/01/2017 11:35 AM     Lab Results   Component Value Date/Time    Glucose (POC) 152 06/16/2014 12:39 PM     Lab Results   Component Value Date/Time    Color YELLOW/STRAW 08/01/2017 09:55 PM    Appearance CLEAR 08/01/2017 09:55 PM    Specific gravity 1.009 08/01/2017 09:55 PM    pH (UA) 6.0 08/01/2017 09:55 PM    Protein NEGATIVE  08/01/2017 09:55 PM Glucose NEGATIVE  08/01/2017 09:55 PM    Ketone NEGATIVE  08/01/2017 09:55 PM    Bilirubin NEGATIVE  08/01/2017 09:55 PM    Urobilinogen 0.2 08/01/2017 09:55 PM    Nitrites NEGATIVE  08/01/2017 09:55 PM    Leukocyte Esterase NEGATIVE  08/01/2017 09:55 PM    Epithelial cells FEW 08/01/2017 09:55 PM    Bacteria NEGATIVE  08/01/2017 09:55 PM    WBC 0-4 08/01/2017 09:55 PM    RBC 0-5 08/01/2017 09:55 PM         Medications Reviewed:     Current Facility-Administered Medications   Medication Dose Route Frequency    ceFAZolin in 0.9% NS (ANCEF) IVPB soln 2 g  2 g IntraVENous Q8H    0.9% sodium chloride infusion  100 mL/hr IntraVENous CONTINUOUS    heparin (porcine) injection 5,000 Units  5,000 Units SubCUTAneous Q12H    naloxone (NARCAN) injection 0.4 mg  0.4 mg IntraVENous PRN    traMADol (ULTRAM) tablet 50 mg  50 mg Oral Q8H PRN    tamsulosin (FLOMAX) capsule 0.4 mg  0.4 mg Oral QHS    aspirin delayed-release tablet 81 mg  81 mg Oral DAILY    docusate sodium (COLACE) capsule 100 mg  100 mg Oral BID    escitalopram oxalate (LEXAPRO) tablet 10 mg  10 mg Oral 7am    metoprolol succinate (TOPROL-XL) XL tablet 25 mg  25 mg Oral DAILY    sodium chloride (NS) flush 5-10 mL  5-10 mL IntraVENous Q8H    sodium chloride (NS) flush 5-10 mL  5-10 mL IntraVENous PRN    acetaminophen (TYLENOL) tablet 650 mg  650 mg Oral Q4H PRN     ______________________________________________________________________  EXPECTED LENGTH OF STAY: 3d 0h  ACTUAL LENGTH OF STAY:          2                 Kristy Wong MD

## 2017-09-02 NOTE — PROGRESS NOTES
Ortho Daily Progress Note    9/2/2017  10:29 AM    POD:  1 Day Post-Op  S/P:  Procedure(s):  INCISION AND DRAINAGE PREPATELLAR BURSA  RIGHT KNEE    Afebrile/VSS  Doing well without complaints of nausea  Pain well controlled  Calves soft/NTTP Bilaterally  , no drainage or dehiscence. Dressing clean and dry. Knee immobilizer intact  Moving lower extremities well. Neurocirculatory exam intact and within normal range. OR cx's pending.   Lab Results   Component Value Date/Time    HGB 11.5 09/02/2017 02:33 AM    INR 1.0 12/29/2014 10:50 AM         PLAN: Heavy S. aureus on cx, could DC Vanco, go to PO abx per ID  DVT prophylaxis: Heparin 5000 Q 12 hours  WBAT with PT-mobilization  Pain Control  Plan to D/C SNF likely next week      KATRINA Ross    Agree with plan

## 2017-09-02 NOTE — PROGRESS NOTES
Problem: Falls - Risk of  Goal: *Absence of Falls  Document Horacio Fall Risk and appropriate interventions in the flowsheet.    Outcome: Progressing Towards Goal  Fall Risk Interventions:        Mentation Interventions: Adequate sleep, hydration, pain control, Door open when patient unattended, Evaluate medications/consider consulting pharmacy, More frequent rounding, Reorient patient, Room close to nurse's station     Medication Interventions: Evaluate medications/consider consulting pharmacy, Teach patient to arise slowly     Elimination Interventions: Call light in reach, Toilet paper/wipes in reach, Toileting schedule/hourly rounds

## 2017-09-03 NOTE — PROGRESS NOTES
ID Progress Note  9/3/2017    Subjective:     Afebrile. He tolerated cefazolin. Objective:     Vitals:   Visit Vitals    /79 (BP 1 Location: Left arm, BP Patient Position: At rest)    Pulse 81    Temp 98 °F (36.7 °C)    Resp 16    Ht 6' 1.5\" (1.867 m)    Wt 89.2 kg (196 lb 9.6 oz)    SpO2 100%    BMI 25.59 kg/m2        Tmax:  Temp (24hrs), Av.4 °F (36.9 °C), Min:97.7 °F (36.5 °C), Max:98.9 °F (37.2 °C)      Exam:    Not in distress  Lungs: clear to auscultation  Heart: s1, s2, no murmurs   Abdomen: soft, nontender  Extremities: right lower leg bandaged     Labs:   Lab Results   Component Value Date/Time    WBC 6.3 2017 02:33 AM    HGB 11.5 2017 02:33 AM    HCT 34.9 2017 02:33 AM    PLATELET 710  02:33 AM    MCV 87.3 2017 02:33 AM     Lab Results   Component Value Date/Time    Sodium 137 2017 02:16 AM    Potassium 3.4 2017 02:16 AM    Chloride 103 2017 02:16 AM    CO2 26 2017 02:16 AM    Anion gap 8 2017 02:16 AM    Glucose 108 2017 02:16 AM    BUN 10 2017 02:16 AM    Creatinine 0.76 2017 02:16 AM    BUN/Creatinine ratio 13 2017 02:16 AM    GFR est AA >60 2017 02:16 AM    GFR est non-AA >60 2017 02:16 AM    Calcium 9.0 2017 02:16 AM    Bilirubin, total 0.7 2017 11:56 AM    AST (SGOT) 16 2017 11:56 AM    Alk. phosphatase 88 2017 11:56 AM    Protein, total 8.2 2017 11:56 AM    Albumin 3.2 2017 11:56 AM    Globulin 5.0 2017 11:56 AM    A-G Ratio 0.6 2017 11:56 AM    ALT (SGPT) 27 2017 11:56 AM       Assessment:     1. Septic prepatellar bursitis with MSSA. 2. Severe cognitive impairment. 3. History of atrial fibrillation. 4. Hypertension. Recommendations:     I spoke with Dr. Abdirizak Gustafson. The right knee joint is not involved. He will likely not need IV abx. He has tolerated cefazolin. Continue this for now.        Yonathan Velez, MD

## 2017-09-03 NOTE — PROGRESS NOTES
Hospitalist Progress Note  Simin Ramirez MD  Office: [phone]        Date of Service:  9/3/2017  NAME:  Nova Valencia YOB: 1931  MRN:  [id number]      Admission Summary:   The patient is an 69-year-old male   with cognitive impairment, most likely secondary to dementia, history of   atrial fibrillation, bilateral lower extremity weakness, essential   hypertension, who presents today complaining of significant pain and   swelling in his right knee. The patient apparently lives at [facility]   and facility reported that the patient has arthritis and has \"pain always\"   in his right knee and left shoulder. In speaking to the patient I was not   able to get a good history secondary to dementia and no family   relatives are present at the bedside but the history was relied on the   ER note. Apparently the patient started having significant pain in his   right knee with redness, swelling and warmth to touch, and was found   to be mildly tachycardic en route with EMS and was asked to be   evaluated for the same. The patient was evaluated by Orthopedics,   who did a right knee aspiration and the patient was found to be septic   arthritic and the patient was requested to be admitted under the   hospitalist service. I was not notified by the ER physician regarding the   admission and there was a delay in evaluating the patient. Currently   the patient is resting in bed and denies any complaints or problems. Denies any headache, blurry vision, sore throat, trouble swallowing,   trouble with speech, any chest pain, shortness of breath, cough, fever,   chills, abdominal pain, constipation, diarrhea, urinary symptoms, focal   or generalized neurological weakness that is new, recent travel, sick   contacts, falls, injuries, trauma to the knee or any other concerns or   problems. The patient denies any hematemesis, melena, hemoptysis.    The patient was found to have a fever of 101.4 in the past.      Interval history / Subjective:   More alert and interactive. Wife and son in the room. Assessment & Plan:   Septic right prepatellar bursitis,the knee joint is spared per surgeon  -He underwent I &D in the OR 9/1  -Fluid culture with MSSA,sensitive to FLQ as well  -Antibiotics switched to ancef. Discussed with Dr Larissa Ca can be discharge on keflex when ready,he is tolerating ancef. -Right LE venous doppler negative for DVT  -Spiked fever on 9/1,blood culture repeated,ngf. The blood culture from 8/10 remained negative. Lactate normal    History of paroxysmal atrial fibrillation  -Currently NSR. Continue lopressor    Hypertension. Stable on lopressor. .   Advanced dementia ,not able to recognize wife and children:lives at [facility],need 24/7 care giver  Body mass index is 25.59 kg/(m^2). Diet:cardiac  Code status: Wife and son both expressed he is DNR. Code status updated to reflect this. DVT prophylaxis: heparin  Care Plan discussed with: wife and son     Discharge planning/disposition:physical therapy evaluation requested,patient may need SNF    Hospital Problems  Date Reviewed: 8/31/2017          Codes Class Noted POA    * (Principal)Arthritis, septic, knee ([facility] [address] ICD-10-CM: M00.9  ICD-9-CM: 711.06  8/31/2017 Unknown                Review of Systems:   Review of systems not obtained due to patient factors. Physical Examination:      Last 24hrs VS reviewed since prior progress note. Most recent are:  Visit Vitals    /85 (BP 1 Location: Left arm, BP Patient Position: At rest)    Pulse 69    Temp 98.3 °F (36.8 °C)    Resp 16    Ht 6' 1.5\" (1.867 m)    Wt 89.2 kg (196 lb 9.6 oz)    SpO2 95%    BMI 25.59 kg/m2           Constitutional:  alert and awake   Eyes: Non icteric,non pallor,no erythema,discharge,PERRLA   ENT:  Oral mucous moist, oropharynx benign. Neck supple,    Resp:  CTA bilaterally. No wheezing/rhonchi/rales.  No accessory muscle use CV:  Regular rhythm, normal rate, no murmurs, gallops, rubs    GI:  Soft, non distended, non tender. normoactive bowel sounds, no hepatosplenomegaly    :  No CVA or suprapubic tenderness   Skin  :  No erythema,rash,bullae,dipigmentation     Musculoskeletal:  Right knee wrapped     Neurologic: Alert and awake,interactive. Intake/Output Summary (Last 24 hours) at 09/03/17 1720  Last data filed at 09/03/17 0856   Gross per 24 hour   Intake             1370 ml   Output                0 ml   Net             1370 ml          Data Review:    Review and/or order of clinical lab test  Review and/or order of tests in the radiology section of CPT  Review and/or order of tests in the medicine section of CPT      Labs:     Recent Labs      09/02/17   0233  09/01/17   0400   WBC  6.3  9.7   HGB  11.5*  11.5*   HCT  34.9*  34.4*   PLT  237  261     Recent Labs      09/03/17   0216  09/02/17   0233  09/01/17   0400   NA  137  136  135*   K  3.4*  3.7  3.6   CL  103  102  102   CO2  26  27  23   BUN  10  10  14   CREA  0.76  0.78  0.86   GLU  108*  105*  126*   CA  9.0  9.0  9.8     No results for input(s): SGOT, GPT, ALT, AP, TBIL, TBILI, TP, ALB, GLOB, GGT, AML, LPSE in the last 72 hours. No lab exists for component: AMYP, HLPSE  No results for input(s): INR, PTP, APTT in the last 72 hours. No lab exists for component: INREXT, INREXT   No results for input(s): FE, TIBC, PSAT, FERR in the last 72 hours. Lab Results   Component Value Date/Time    Folate 15.4 05/12/2015 11:30 AM      No results for input(s): PH, PCO2, PO2 in the last 72 hours. No results for input(s): CPK, CKNDX, TROIQ in the last 72 hours.     No lab exists for component: CPKMB  Lab Results   Component Value Date/Time    Cholesterol, total 177 08/01/2017 11:35 AM    HDL Cholesterol 49 08/01/2017 11:35 AM    LDL, calculated 110 08/01/2017 11:35 AM    Triglyceride 90 08/01/2017 11:35 AM    CHOL/HDL Ratio 3.6 08/01/2017 11:35 AM     Lab Results Component Value Date/Time    Glucose (POC) 152 06/16/2014 12:39 PM     Lab Results   Component Value Date/Time    Color YELLOW/STRAW 08/01/2017 09:55 PM    Appearance CLEAR 08/01/2017 09:55 PM    Specific gravity 1.009 08/01/2017 09:55 PM    pH (UA) 6.0 08/01/2017 09:55 PM    Protein NEGATIVE  08/01/2017 09:55 PM    Glucose NEGATIVE  08/01/2017 09:55 PM    Ketone NEGATIVE  08/01/2017 09:55 PM    Bilirubin NEGATIVE  08/01/2017 09:55 PM    Urobilinogen 0.2 08/01/2017 09:55 PM    Nitrites NEGATIVE  08/01/2017 09:55 PM    Leukocyte Esterase NEGATIVE  08/01/2017 09:55 PM    Epithelial cells FEW 08/01/2017 09:55 PM    Bacteria NEGATIVE  08/01/2017 09:55 PM    WBC 0-4 08/01/2017 09:55 PM    RBC 0-5 08/01/2017 09:55 PM         Medications Reviewed:     Current Facility-Administered Medications   Medication Dose Route Frequency    ceFAZolin in 0.9% NS (ANCEF) IVPB soln 2 g  2 g IntraVENous Q8H    0.9% sodium chloride infusion  100 mL/hr IntraVENous CONTINUOUS    heparin (porcine) injection 5,000 Units  5,000 Units SubCUTAneous Q12H    naloxone (NARCAN) injection 0.4 mg  0.4 mg IntraVENous PRN    traMADol (ULTRAM) tablet 50 mg  50 mg Oral Q8H PRN    tamsulosin (FLOMAX) capsule 0.4 mg  0.4 mg Oral QHS    aspirin delayed-release tablet 81 mg  81 mg Oral DAILY    docusate sodium (COLACE) capsule 100 mg  100 mg Oral BID    escitalopram oxalate (LEXAPRO) tablet 10 mg  10 mg Oral 7am    metoprolol succinate (TOPROL-XL) XL tablet 25 mg  25 mg Oral DAILY    sodium chloride (NS) flush 5-10 mL  5-10 mL IntraVENous Q8H    sodium chloride (NS) flush 5-10 mL  5-10 mL IntraVENous PRN    acetaminophen (TYLENOL) tablet 650 mg  650 mg Oral Q4H PRN     ______________________________________________________________________  EXPECTED LENGTH OF STAY: 3d 0h  ACTUAL LENGTH OF STAY:          3                 Derotha Severe, MD

## 2017-09-03 NOTE — PROGRESS NOTES
Ortho Daily Progress Note    9/3/2017  9:54 AM    POD:  2 Days Post-Op  S/P:  Procedure(s):  INCISION AND DRAINAGE PREPATELLAR BURSA  RIGHT KNEE    Afebrile/VSS, confused, fatigued today  Doing well without complaints of nausea  Pain well controlled  Calves soft/NTTP Bilaterally  Incision OK, no drainage or dehiscence, residual erythema- waning. Sutures intact  Dressing clean and dry, changed to a single ABD and ACE  Moving lower extremities well. Neurocirculatory exam intact and within normal range.     Lab Results   Component Value Date/Time    HGB 11.5 09/02/2017 02:33 AM    INR 1.0 12/29/2014 10:50 AM         PLAN: Converted to Ancef, maritza's NGTD  DVT prophylaxis: Heparin 5K bid  WBAT with PT-mobilization in immobilizer  Pain Control  Plan to D/C SNF later this week      KATRINA Irizarry

## 2017-09-03 NOTE — ROUTINE PROCESS
Bedside shift change report given to Champ GuerreroRN (oncoming nurse) by Catalina Hall RN(offgoing nurse). Report given with SBAR.

## 2017-09-03 NOTE — PROGRESS NOTES
Problem: Falls - Risk of  Goal: *Absence of Falls  Document Horacio Fall Risk and appropriate interventions in the flowsheet.    Outcome: Progressing Towards Goal  Fall Risk Interventions:        Mentation Interventions: Bed/chair exit alarm, Door open when patient unattended     Medication Interventions: Bed/chair exit alarm, Evaluate medications/consider consulting pharmacy     Elimination Interventions: Call light in reach, Patient to call for help with toileting needs                 Problem: Pressure Injury - Risk of  Goal: *Prevention of pressure ulcer  Outcome: Progressing Towards Goal    09/03/17 0856   Wound Prevention and Protection Methods   Orientation of Wound Prevention Posterior   Location of Wound Prevention Sacrum/Coccyx   Dressing Present  No   Read Only, Retired: Wound Treatment (non-mechanical)   Wound Offloading (Prevention Methods) Bed, pressure reduction mattress

## 2017-09-04 NOTE — PROGRESS NOTES
Bedside shift change report given to Sriram Kamara RN (oncoming nurse) by [employer], RN (offgoing nurse). Report included the following information SBAR, Kardex and Recent Results.

## 2017-09-04 NOTE — PROGRESS NOTES
Ortho Daily Progress Note    9/4/2017  9:44 AM    POD:  3 Days Post-Op  S/P:  Procedure(s):  INCISION AND DRAINAGE PREPATELLAR BURSA  RIGHT KNEE    Afebrile/VSS  Doing well without complaints of nausea  Pain well controlled  Calves soft/NTTP Bilaterally  Incision OK, no drainage or dehiscence. Dressing clean and dry. In knee immobilizer  Moving lower extremities well. Neurocirculatory exam intact and within normal range. Lab Results   Component Value Date/Time    HGB 11.5 09/02/2017 02:33 AM    INR 1.0 12/29/2014 10:50 AM         PLAN: ID following for S. Aureus- conversion to PO abx for DC  DVT prophylaxis: Heparin 5K Q12  WBAT with PT-mobilization- slow to mobilize thus far  Pain Control- has not been an issue  [facility]      KATRINA Diaz     Agree with plan.

## 2017-09-04 NOTE — PROGRESS NOTES
Bedside and Verbal shift change report given to Denisa Dukes RN (oncoming nurse) by Jose J Arce RN (offgoing nurse). Report included the following information SBAR.

## 2017-09-04 NOTE — PROGRESS NOTES
Hospitalist Progress Note  Dahiana Saldana MD  Office: [phone]        Date of Service:  2017  NAME:  Mela Araujo  :  1931  MRN:  [id number]      Admission Summary:   The patient is an 15-year-old male   with cognitive impairment, most likely secondary to dementia, history of   atrial fibrillation, bilateral lower extremity weakness, essential   hypertension, who presents today complaining of significant pain and   swelling in his right knee. The patient apparently lives at [facility]   and facility reported that the patient has arthritis and has \"pain always\"   in his right knee and left shoulder. In speaking to the patient I was not   able to get a good history secondary to dementia and no family   relatives are present at the bedside but the history was relied on the   ER note. Apparently the patient started having significant pain in his   right knee with redness, swelling and warmth to touch, and was found   to be mildly tachycardic en route with EMS and was asked to be   evaluated for the same. The patient was evaluated by Orthopedics,   who did a right knee aspiration and the patient was found to be septic   arthritic and the patient was requested to be admitted under the   hospitalist service. I was not notified by the ER physician regarding the   admission and there was a delay in evaluating the patient. Currently   the patient is resting in bed and denies any complaints or problems. Denies any headache, blurry vision, sore throat, trouble swallowing,   trouble with speech, any chest pain, shortness of breath, cough, fever,   chills, abdominal pain, constipation, diarrhea, urinary symptoms, focal   or generalized neurological weakness that is new, recent travel, sick   contacts, falls, injuries, trauma to the knee or any other concerns or   problems. The patient denies any hematemesis, melena, hemoptysis.    The patient was found to have a fever of 101.4 in the past.      Interval history / Subjective:   Sleepy today. Family not at bedside. Assessment & Plan:   Septic right prepatellar bursitis,the knee joint is spared per surgeon  -He underwent I &D in the OR 9/1  -Fluid culture with MSSA,sensitive to FLQ as well  -Antibiotics switched to ancef. Discussed with Dr Keenan Erickson can be discharge on keflex when ready,he is tolerating ancef. -Right LE venous doppler negative for DVT  -Spiked fever on 9/1,blood culture repeated,ngf. The blood culture from 8/10 remained negative. Lactate normal    History of paroxysmal atrial fibrillation  -Currently NSR. Continue lopressor    Hypertension. Stable on lopressor. .   Advanced dementia ,not able to recognize wife and children:lives at [facility],need 24/7 care giver  Body mass index is 25.97 kg/(m^2). Diet:cardiac  Code status: Wife and son both expressed he is DNR. Code status updated to reflect this. DVT prophylaxis: heparin  Care Plan discussed with: wife and son     Discharge planning/disposition:SNF,he is from the [facility] son they do not have rehab there. Hospital Problems  Date Reviewed: 8/31/2017          Codes Class Noted POA    * (Principal)Arthritis, septic, knee [facility]) ICD-10-CM: M00.9  ICD-9-CM: 711.06  8/31/2017 Unknown                Review of Systems:   Review of systems not obtained due to patient factors. Physical Examination:      Last 24hrs VS reviewed since prior progress note. Most recent are:  Visit Vitals    /87 (BP 1 Location: Right arm, BP Patient Position: At rest)    Pulse 76    Temp 98.1 °F (36.7 °C)    Resp 16    Ht 6' 1.5\" (1.867 m)    Wt 90.5 kg (199 lb 8.3 oz)    SpO2 91%    BMI 25.97 kg/m2           Constitutional:  sleepy today   Eyes: Non icteric,non pallor,no erythema,discharge,PERRLA   ENT:  Oral mucous moist, oropharynx benign. Neck supple,    Resp:  CTA bilaterally. No wheezing/rhonchi/rales.  No accessory muscle use   CV: Regular rhythm, normal rate, no murmurs, gallops, rubs    GI:  Soft, non distended, non tender. normoactive bowel sounds, no hepatosplenomegaly    :  No CVA or suprapubic tenderness   Skin  :  No erythema,rash,bullae,dipigmentation     Musculoskeletal:  Right knee wrapped     Neurologic: Sleepy today              Intake/Output Summary (Last 24 hours) at 09/04/17 1526  Last data filed at 09/04/17 0113   Gross per 24 hour   Intake             6388 ml   Output                0 ml   Net             6388 ml          Data Review:    Review and/or order of clinical lab test  Review and/or order of tests in the radiology section of CPT  Review and/or order of tests in the medicine section of CPT      Labs:     Recent Labs      09/02/17   0233   WBC  6.3   HGB  11.5*   HCT  34.9*   PLT  237     Recent Labs      09/04/17   0210  09/03/17   0216  09/02/17   0233   NA   --   137  136   K  3.7  3.4*  3.7   CL   --   103  102   CO2   --   26  27   BUN   --   10  10   CREA   --   0.76  0.78   GLU   --   108*  105*   CA   --   9.0  9.0     No results for input(s): SGOT, GPT, ALT, AP, TBIL, TBILI, TP, ALB, GLOB, GGT, AML, LPSE in the last 72 hours. No lab exists for component: AMYP, HLPSE  No results for input(s): INR, PTP, APTT in the last 72 hours. No lab exists for component: INREXT, INREXT   No results for input(s): FE, TIBC, PSAT, FERR in the last 72 hours. Lab Results   Component Value Date/Time    Folate 15.4 05/12/2015 11:30 AM      No results for input(s): PH, PCO2, PO2 in the last 72 hours. No results for input(s): CPK, CKNDX, TROIQ in the last 72 hours.     No lab exists for component: CPKMB  Lab Results   Component Value Date/Time    Cholesterol, total 177 08/01/2017 11:35 AM    HDL Cholesterol 49 08/01/2017 11:35 AM    LDL, calculated 110 08/01/2017 11:35 AM    Triglyceride 90 08/01/2017 11:35 AM    CHOL/HDL Ratio 3.6 08/01/2017 11:35 AM     Lab Results   Component Value Date/Time    Glucose (POC) 152 06/16/2014 12:39 PM     Lab Results   Component Value Date/Time    Color YELLOW/STRAW 08/01/2017 09:55 PM    Appearance CLEAR 08/01/2017 09:55 PM    Specific gravity 1.009 08/01/2017 09:55 PM    pH (UA) 6.0 08/01/2017 09:55 PM    Protein NEGATIVE  08/01/2017 09:55 PM    Glucose NEGATIVE  08/01/2017 09:55 PM    Ketone NEGATIVE  08/01/2017 09:55 PM    Bilirubin NEGATIVE  08/01/2017 09:55 PM    Urobilinogen 0.2 08/01/2017 09:55 PM    Nitrites NEGATIVE  08/01/2017 09:55 PM    Leukocyte Esterase NEGATIVE  08/01/2017 09:55 PM    Epithelial cells FEW 08/01/2017 09:55 PM    Bacteria NEGATIVE  08/01/2017 09:55 PM    WBC 0-4 08/01/2017 09:55 PM    RBC 0-5 08/01/2017 09:55 PM         Medications Reviewed:     Current Facility-Administered Medications   Medication Dose Route Frequency    ceFAZolin in 0.9% NS (ANCEF) IVPB soln 2 g  2 g IntraVENous Q8H    0.9% sodium chloride infusion  100 mL/hr IntraVENous CONTINUOUS    heparin (porcine) injection 5,000 Units  5,000 Units SubCUTAneous Q12H    naloxone (NARCAN) injection 0.4 mg  0.4 mg IntraVENous PRN    traMADol (ULTRAM) tablet 50 mg  50 mg Oral Q8H PRN    tamsulosin (FLOMAX) capsule 0.4 mg  0.4 mg Oral QHS    aspirin delayed-release tablet 81 mg  81 mg Oral DAILY    docusate sodium (COLACE) capsule 100 mg  100 mg Oral BID    escitalopram oxalate (LEXAPRO) tablet 10 mg  10 mg Oral 7am    metoprolol succinate (TOPROL-XL) XL tablet 25 mg  25 mg Oral DAILY    sodium chloride (NS) flush 5-10 mL  5-10 mL IntraVENous Q8H    sodium chloride (NS) flush 5-10 mL  5-10 mL IntraVENous PRN    acetaminophen (TYLENOL) tablet 650 mg  650 mg Oral Q4H PRN     ______________________________________________________________________  EXPECTED LENGTH OF STAY: 3d 0h  ACTUAL LENGTH OF STAY:          4                 Carrington Avendaño MD

## 2017-09-04 NOTE — PROGRESS NOTES
Problem: Falls - Risk of  Goal: *Absence of Falls  Document Horacio Fall Risk and appropriate interventions in the flowsheet.    Outcome: Progressing Towards Goal  Fall Risk Interventions:        Mentation Interventions: Bed/chair exit alarm     Medication Interventions: Bed/chair exit alarm     Elimination Interventions: Call light in reach

## 2017-09-04 NOTE — PROGRESS NOTES
Problem: Mobility Impaired (Adult and Pediatric)  Goal: *Acute Goals and Plan of Care (Insert Text)  Physical Therapy Goals  9/4/2017  1. Patient will move from supine to sit and sit to supine , scoot up and down and roll side to side in bed with moderate assistance within 7 day(s). 2. Patient will transfer from bed to chair and chair to bed with moderate assistance using the least restrictive device within 7 day(s). 3. Patient will perform sit to stand with moderate assistance within 7 day(s). 4. Patient will ambulate with moderate assistance for 5 feet with the least restrictive device within 7 day(s). PHYSICAL THERAPY EVALUATION  Patient: Veronica Michael (75 y.o. male)  Date: 9/4/2017  Primary Diagnosis: Arthritis, septic, knee (HCC)  UNKNOWN  Procedure(s) (LRB):  INCISION AND DRAINAGE PREPATELLAR BURSA  RIGHT KNEE (Right) 3 Days Post-Op   Precautions: WBAT with knee immobilizer on         ASSESSMENT :  Based on the objective data described below, the patient presents with impaired mobility and ambulation. Patient has dementia. According to the family he was ambulatory until about a month ago and more recently was getting PT to help with keeping him ambulatory. Today his wife and son are present. He is not speaking and barely opening his eyes. With max assist of 2 we had him come to sitting EOB. At times he was able to hold himself up in sitting. His son sat next to him on the bed and then helped to hold him up while his wife combed his hair. Sat for 10 minutes. Returned to supine to be cleaned up and then the bed was put in the chair position with the RLE supported on pillows due to the immobilizer. The family is hoping he will get back to ambulating but realize he may not. Patient will benefit from skilled intervention to address the above impairments.   Patients rehabilitation potential is considered to be Fair  Factors which may influence rehabilitation potential include:   [ ]         None noted  [X]         Mental ability/status  [ ]         Medical condition  [ ]         Home/family situation and support systems  [ ]         Safety awareness  [ ]         Pain tolerance/management  [ ]         Other:        PLAN :  Recommendations and Planned Interventions:  [X]           Bed Mobility Training             [ ]    Neuromuscular Re-Education  [X]           Transfer Training                   [ ]    Orthotic/Prosthetic Training  [X]           Gait Training                         [ ]    Modalities  [ ]           Therapeutic Exercises           [ ]    Edema Management/Control  [ ]           Therapeutic Activities            [X]    Patient and Family Training/Education  [ ]           Other (comment):     Frequency/Duration: Patient will be followed by physical therapy  5 times a week to address goals. Discharge Recommendations: Adal Johnson  Further Equipment Recommendations for Discharge: none       SUBJECTIVE:   Patient stated -not speaking.        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    Constipation      Contact dermatitis and other eczema, due to unspecified cause       ak    Dementia       7/27/15 copy of neuropsych eval    Diverticular disease       dr Max Spicer DJD of shoulder       dr Maggy Lord  7/22/15 notes    ED (erectile dysfunction)       [facility]    Fungal infection of nail [id number]     [facility]    Gallbladder calculus without cholecystitis 4/12/2016    GERD (gastroesophageal reflux disease)       reflux dr Jmi mann    Headache       migraines    Hearing loss      [facility] fracture, right ([facility] [address]      Hypertension      Insomnia      Left knee pain 5/27/14 7/28/16     patellofemoral arthrosis of right knee  1/26/17 f/u    Left shoulder pain 3/31/14  1/7/16     notes [facility] 5/5/17    Nosebleed       6/8/17 note from ENT Ozzy Conrad May    OAB (overactive bladder)       [facility]    Osteopenia 3/2007    PAC (premature atrial contraction)      Pacemaker       7/29/15 note about pacer check    Panic attacks      PAT (paroxysmal atrial tachycardia) (HCC)      Peripheral neuropathy (HCC)      PVC (premature ventricular contraction)      S/P laparoscopic cholecystectomy 4/25/2016    Shingles 9/2010     dr Demetrios Jeans Earlene Valdo Tinnitus      Varicocele       dr Harika Abraham     Past Surgical History:   Procedure Laterality Date    EGD        HX APPENDECTOMY   11/2008     dr ashlee Gtz GI         colonoscopy    HX HEART CATHETERIZATION   4/6/2012     mild to moderate CAD in LAD and OM, RCA, less than 50% lesions    HX HEENT         cataract left    HX LAP CHOLECYSTECTOMY   4/13/16    HX ORTHOPAEDIC         rt femur fx.  HX PACEMAKER        INS PPM/ICD LED DUAL ONLY   4/26/2012          IR INSERT PACEMAKER FLUORO GUIDE   4/26/2012          ND COLONOSCOPY FLX DX W/COLLJ SPEC WHEN PFRMD   1/27/2012          ND EGD TRANSORAL BIOPSY SINGLE/MULTIPLE   1/27/2012           Prior Level of Function/Home Situation: Had been ambulating with no AD until about a month ago.    Personal factors and/or comorbidities impacting plan of care:      Home Situation  Home Environment: Assisted living  One/Two Story Residence: One story  Living Alone: No  Support Systems: Child(travis)  Patient Expects to be Discharged to[de-identified] Assisted living  Current DME Used/Available at Home: Marleni Rodriguez     EXAMINATION/PRESENTATION/DECISION MAKING:   Critical Behavior:  Neurologic State: Alert, Confused  Orientation Level: Disoriented to place, Disoriented to situation, Disoriented to time, Oriented to person  Cognition: Decreased attention/concentration, Decreased command following, Impulsive, Memory loss     Hearing: Auditory  Auditory Impairment: Hard of hearing, bilateral  Skin:  Per nursing. Edema: per nursing. Range Of Motion:  AROM: Generally decreased, functional           PROM: Generally decreased, functional           Strength:    Strength: Within functional limits                    Tone & Sensation:   Tone: Normal                              Coordination:  Coordination: Within functional limits  Vision:      Functional Mobility:  Bed Mobility:  Rolling: Total assistance;Assist x2  Supine to Sit: Total assistance;Assist x2  Sit to Supine: Total assistance;Assist x2     Transfers:   Unable at this time. Balance:   Sitting: Impaired  Sitting - Static: Fair (occasional)  Ambulation/Gait Training:      Unable at this time. Functional Measure:  Barthel Index:      Bathin  Bladder: 0  Bowels: 0  Groomin  Dressin  Feedin  Mobility: 0  Stairs: 0  Toilet Use: 0  Transfer (Bed to Chair and Back): 0  Total: 0         Barthel and G-code impairment scale:  Percentage of impairment CH  0% CI  1-19% CJ  20-39% CK  40-59% CL  60-79% CM  80-99% CN  100%   Barthel Score 0-100 100 99-80 79-60 59-40 20-39 1-19    0   Barthel Score 0-20 20 17-19 13-16 9-12 5-8 1-4 0      The Barthel ADL Index: Guidelines  1. The index should be used as a record of what a patient does, not as a record of what a patient could do. 2. The main aim is to establish degree of independence from any help, physical or verbal, however minor and for whatever reason. 3. The need for supervision renders the patient not independent. 4. A patient's performance should be established using the best available evidence.  Asking the patient, friends/relatives and nurses are the usual sources, but direct observation and common sense are also important. However direct testing is not needed. 5. Usually the patient's performance over the preceding 24-48 hours is important, but occasionally longer periods will be relevant. 6. Middle categories imply that the patient supplies over 50 per cent of the effort. 7. Use of aids to be independent is allowed. Marty Verma., Barthel, D.W. (6923). Functional evaluation: the Barthel Index. [address] [facility] (14)2. Marjan Whipple bobby PRISCILA Rene, Jerald Gimenez., Adelia Fulton, Clarissa, [address] (1999). Measuring the change indisability after inpatient rehabilitation; comparison of the responsiveness of the Barthel Index and Functional [place] Measure. Journal of Neurology, Neurosurgery, and Psychiatry, 66(4), 845-661. ANKIT Rodriguez, RATNA Rios, & Rebecca Izaguirre MMARJORIE. (2004.) Assessment of post-stroke quality of life in cost-effectiveness studies: The usefulness of the Barthel Index and the EuroQoL-5D. Quality of Life Research, 13, 919-01         G codes: In compliance with CMSs Claims Based Outcome Reporting, the following G-code set was chosen for this patient based on their primary functional limitation being treated: The outcome measure chosen to determine the severity of the functional limitation was the Barthel with a score of 0/100 which was correlated with the impairment scale. · Mobility - Walking and Moving Around:               - CURRENT STATUS:    CN - 100% impaired, limited or restricted               - GOAL STATUS:           CN - 100% impaired, limited or restricted               - D/C STATUS:                       CN - 100% impaired, limited or restricted      Pain:  Pain Scale 1: Adult Nonverbal Pain Scale  Pain Intensity 1: 0              Activity Tolerance:   Good. Please refer to the flowsheet for vital signs taken during this treatment.   After treatment:   [ ]         Patient left in no apparent distress sitting up in chair  [X]         Patient left in no apparent distress in bed  [X]         Call bell left within reach  [X]         Nursing notified  [X]         Caregiver present  [ ]         Bed alarm activated      COMMUNICATION/EDUCATION:   The patients plan of care was discussed with: Registered Nurse, Rehabilitation Attendant and Certified Nursing Assistant/Patient Care Technician.  [X]         Fall prevention education was provided and the patient/caregiver indicated understanding. [X]         Patient/family have participated as able in goal setting and plan of care. [X]         Patient/family agree to work toward stated goals and plan of care. [ ]         Patient understands intent and goals of therapy, but is neutral about his/her participation. [ ]         Patient is unable to participate in goal setting and plan of care.      Thank you for this referral.  Keely Wahl, PT   Time Calculation: 20 mins

## 2017-09-05 NOTE — PROGRESS NOTES
Hospitalist Progress Note            Patient admitted with prepatellar bursitis of the right knee. Daily Progress Note: 2017    Assessment/Plan:   Septic right prepatellar bursitis,the knee joint is spared per surgeon  - he underwent I &D in the OR   - fluid culture with MSSA --> sensitive to cephalosporins  - continue with IV Ancef --> d/c on Keflex PO      History of paroxysmal atrial fibrillation  - currently NSR  - continue Lopressor     Hypertension   - stable on Lopressor     Advanced dementia      DISPO: planning to go to SNF tomorrow    * d/w family at bedside       Subjective:   Feeling OK. Eating well. Overnight events discussed with nursing. Review of Systems:   No CP, no SOB. Objective:     Visit Vitals    /90    Pulse 73    Temp 98.7 °F (37.1 °C)    Resp 16    Ht 6' 1.5\" (1.867 m)    Wt 90.5 kg (199 lb 8.3 oz)    SpO2 94%    BMI 25.97 kg/m2    O2 Flow Rate (L/min): 1 l/min O2 Device: Room air    Temp (24hrs), Av.5 °F (36.9 °C), Min:97.9 °F (36.6 °C), Max:99.2 °F (37.3 °C)            1901 -  0700  In: 6674 [P.O.:240; I.V.:6388]  Out: 700 [Urine:700]    EXAM:  General: WD, WN. Alert, cooperative, no acute distress    HEENT: NC, atraumatic. PERRL, EOMI. Anicteric sclerae. Neck:   Supple, trachea midline  Lungs:  CTA bilaterally. No Wheezing/Rhonchi/Rales. Heart:  Regular rhythm,  No murmur (), No Rubs, No Gallops  Abdomen: Soft, Non distended, Non tender.  +Bowel sounds, no HSM  Extremities: No c/c/e; right knee in immobilizer  Neurologic:  CN 2-12 gi, Alert and oriented X 0. No acute neurological distress   Psych:   Very poor insight. Not anxious nor agitated. Data Review:     No results found for this or any previous visit (from the past 24 hour(s)).     Principal Problem:    Arthritis, septic, knee ([address] (2017)        Medications reviewed  Current Facility-Administered Medications   Medication Dose Route Frequency    ceFAZolin in 0.9% NS (ANCEF) IVPB soln 2 g  2 g IntraVENous Q8H    0.9% sodium chloride infusion  100 mL/hr IntraVENous CONTINUOUS    heparin (porcine) injection 5,000 Units  5,000 Units SubCUTAneous Q12H    naloxone (NARCAN) injection 0.4 mg  0.4 mg IntraVENous PRN    traMADol (ULTRAM) tablet 50 mg  50 mg Oral Q8H PRN    tamsulosin (FLOMAX) capsule 0.4 mg  0.4 mg Oral QHS    aspirin delayed-release tablet 81 mg  81 mg Oral DAILY    docusate sodium (COLACE) capsule 100 mg  100 mg Oral BID    escitalopram oxalate (LEXAPRO) tablet 10 mg  10 mg Oral 7am    metoprolol succinate (TOPROL-XL) XL tablet 25 mg  25 mg Oral DAILY    sodium chloride (NS) flush 5-10 mL  5-10 mL IntraVENous Q8H    sodium chloride (NS) flush 5-10 mL  5-10 mL IntraVENous PRN    acetaminophen (TYLENOL) tablet 650 mg  650 mg Oral Q4H PRN       DVT prophylaxis: SC heparin    Total time spent with patient: 20 minutes    Rajesh Easton MD

## 2017-09-05 NOTE — PROGRESS NOTES
ID Progress Note  2017    Subjective:     Afebrile. He tolerated cefazolin. He says he is not in pain. Objective:     Vitals:   Visit Vitals    /85 (BP 1 Location: Right arm, BP Patient Position: At rest)    Pulse 80    Temp 98.9 °F (37.2 °C)    Resp 16    Ht 6' 1.5\" (1.867 m)    Wt 90.5 kg (199 lb 8.3 oz)    SpO2 94%    BMI 25.97 kg/m2        Tmax:  Temp (24hrs), Av.7 °F (37.1 °C), Min:97.9 °F (36.6 °C), Max:99.2 °F (37.3 °C)      Exam:    Not in distress  Lungs: clear to auscultation  Heart: s1, s2, no murmurs   Abdomen: soft, nontender  Extremities: right knee incision is clean. I can palpate the bony landmarks of the right knee. Labs:   Lab Results   Component Value Date/Time    WBC 6.3 2017 02:33 AM    HGB 11.5 2017 02:33 AM    HCT 34.9 2017 02:33 AM    PLATELET 871  02:33 AM    MCV 87.3 2017 02:33 AM     Lab Results   Component Value Date/Time    Sodium 137 2017 02:16 AM    Potassium 3.7 2017 02:10 AM    Chloride 103 2017 02:16 AM    CO2 26 2017 02:16 AM    Anion gap 8 2017 02:16 AM    Glucose 108 2017 02:16 AM    BUN 10 2017 02:16 AM    Creatinine 0.76 2017 02:16 AM    BUN/Creatinine ratio 13 2017 02:16 AM    GFR est AA >60 2017 02:16 AM    GFR est non-AA >60 2017 02:16 AM    Calcium 9.0 2017 02:16 AM    Bilirubin, total 0.7 2017 11:56 AM    AST (SGOT) 16 2017 11:56 AM    Alk. phosphatase 88 2017 11:56 AM    Protein, total 8.2 2017 11:56 AM    Albumin 3.2 2017 11:56 AM    Globulin 5.0 2017 11:56 AM    A-G Ratio 0.6 2017 11:56 AM    ALT (SGPT) 27 2017 11:56 AM       Assessment:     1. Septic prepatellar bursitis with MSSA. 2. Severe cognitive impairment. 3. History of atrial fibrillation. 4. Hypertension. Recommendations:     I spoke with Dr. Lisa Estrada. The right knee joint is not involved.  He would have received 7 days of IV abx by tomorrow. He can be switched to keflex 500 mg 4x a day for 14 more days.      Paty Abebe MD

## 2017-09-05 NOTE — ROUTINE PROCESS
Bedside and Verbal shift change report given to [facility], RN (oncoming nurse) by Alta Sifuentes RN (offgoing nurse).  Report included the following information SBAR. '

## 2017-09-05 NOTE — PROGRESS NOTES
Bedside and Verbal shift change report given to Honora Schwab (oncoming nurse) by Brando Amaya (offgoing nurse). Report included the following information SBAR, Kardex, Intake/Output, MAR and Recent Results.

## 2017-09-05 NOTE — PROGRESS NOTES
Problem: Mobility Impaired (Adult and Pediatric)  Goal: *Acute Goals and Plan of Care (Insert Text)  Physical Therapy Goals  9/4/2017  1. Patient will move from supine to sit and sit to supine , scoot up and down and roll side to side in bed with moderate assistance within 7 day(s). 2. Patient will transfer from bed to chair and chair to bed with moderate assistance using the least restrictive device within 7 day(s). 3. Patient will perform sit to stand with moderate assistance within 7 day(s). 4. Patient will ambulate with moderate assistance for 5 feet with the least restrictive device within 7 day(s). PHYSICAL THERAPY TREATMENT  Patient: Talat Arndt (33 y.o. male)  Date: 9/5/2017  Diagnosis: Arthritis, septic, knee (HCC)  UNKNOWN Arthritis, septic, knee (HCC)  Procedure(s) (LRB):  INCISION AND DRAINAGE PREPATELLAR BURSA  RIGHT KNEE (Right) 4 Days Post-Op  Precautions:    Chart, physical therapy assessment, plan of care and goals were reviewed. ASSESSMENT:  Pt more alert this morning eating his whole breakfast. Pt was able to initiate bed mobility to sit EOB. Pt did require physical assistance to complete task. Attempted to stand but unable with posterior lean. Pt did initiate but posterior lean. Pt may benefit from SNF at discharge. Progression toward goals:  [X]      Improving appropriately and progressing toward goals  [ ]      Improving slowly and progressing toward goals  [ ]      Not making progress toward goals and plan of care will be adjusted       PLAN:  Patient continues to benefit from skilled intervention to address the above impairments. Continue treatment per established plan of care. Discharge Recommendations:  Skilled Nursing Facility  Further Equipment Recommendations for Discharge:  TBD       SUBJECTIVE:   Patient stated I think I can.       OBJECTIVE DATA SUMMARY:   Critical Behavior:  Neurologic State: Alert, Confused  Orientation Level: Oriented to person  Cognition: Follows commands     Functional Mobility Training:  Bed Mobility:  Rolling: Maximum assistance; Additional time  Supine to Sit: Moderate assistance; Additional time;Assist x2  Sit to Supine: Maximum assistance; Additional time;Assist x2                       Transfers:  Sit to Stand: Maximum assistance; Additional time;Assist x2 (pt unable to achieve stand)                                Balance:  Sitting: Impaired  Sitting - Static: Fair (occasional)  Sitting - Dynamic: Poor (constant support)  Standing: Impaired  Standing - Static: Poor  Ambulation/Gait Training:                                                   Stairs:              Neuro Re-Education:     Therapeutic Exercises:      Pain:  Pain Scale 1: Visual  Pain Intensity 1: 0              Activity Tolerance:   poor  Please refer to the flowsheet for vital signs taken during this treatment.   After treatment:   [ ] Patient left in no apparent distress sitting up in chair  [X] Patient left in no apparent distress in bed  [X] Call bell left within reach  [X] Nursing notified  [ ] Caregiver present  [ ] Bed alarm activated      COMMUNICATION/COLLABORATION:   The patients plan of care was discussed with: Registered Nurse     Diaz Gomez PTA   Time Calculation: 14 mins

## 2017-09-05 NOTE — PROGRESS NOTES
Spiritual Care Partner Volunteer visited patient in [address] [place] [address] [address] on 9/5/17. Documented by:  Juana Marx M.Div.    Paging Service [phone] ([phone])

## 2017-09-05 NOTE — PROGRESS NOTES
Ortho Daily Progress Note      Patient: Chadd Lobo                   MRN: [id number]  Sex: male  YOB: 1931           Age: 80 y.o.    4 Days Post-Op    Procedure(s): INCISION AND DRAINAGE PREPATELLAR BURSA  RIGHT KNEE     Visit Vitals    /90    Pulse 73    Temp 98.7 °F (37.1 °C)    Resp 16    Ht 6' 1.5\" (1.867 m)    Wt 90.5 kg (199 lb 8.3 oz)    SpO2 94%    BMI 25.97 kg/m2        Lab Results:  HGB   Date/Time Value Ref Range Status   09/02/2017 02:33 AM 11.5 (L) 12.1 - 17.0 g/dL Final     INR   Date/Time Value Ref Range Status   12/29/2014 10:50 AM 1.0 0.9 - 1.1   Final     Comment:     A single therapeutic range for Vit K antagonists may not be optimal for all indications - see June, 2008 issue of Chest, American College of Chest Physicians Evidence-Based Clinical Practice Guidelines, 8th Edition. Physical Exam:    DRESSING: clean/dry  SWELLING: mild  NEUROLOGICAL: intact  PULSE:yes   GENERAL: cooperative, no distress, appears stated age, confused  WOUND: Wound clean, dry, intact - no significant drainage  MOTION: Knee immobilizer      Plan:    Antibiotics per ID  DC to assisted living    [address] [place], [place]  9/5/2017   11:25 AM    Agree with plan  .

## 2017-09-06 PROBLEM — M00.9 ARTHRITIS, SEPTIC, KNEE (HCC): Status: RESOLVED | Noted: 2017-01-01 | Resolved: 2017-01-01

## 2017-09-06 NOTE — PROGRESS NOTES
Ortho Daily Progress Note      Patient: Michael Tavarez                   MRN: [id number]  Sex: male  YOB: 1931           Age: 80 y.o.    5 Days Post-Op    Procedure(s): INCISION AND DRAINAGE PREPATELLAR BURSA  RIGHT KNEE    Doing well, no complaints, Afeb/VSS, incision healing well, decreasing swelling and redness, minimal drainage, OK for discharge from ortho standpoint, continue po ABX per ID, remove stitches in a week, follow-up in office in 2 weeks    [address] [place] [address]  9/6/2017   12:02 PM      .agree with d/c

## 2017-09-06 NOTE — PROGRESS NOTES
Bedside shift change report given to Ziggy French RN (oncoming nurse) by William Weir RN (offgoing nurse). Report included the following information SBAR, Kardex, Intake/Output, MAR and Recent Results.

## 2017-09-06 NOTE — PROGRESS NOTES
Problem: Falls - Risk of  Goal: *Absence of Falls  Document Horacio Fall Risk and appropriate interventions in the flowsheet.    Outcome: Progressing Towards Goal  Fall Risk Interventions:        Mentation Interventions: Adequate sleep, hydration, pain control, Bed/chair exit alarm, Door open when patient unattended, Gait belt with transfers/ambulation, More frequent rounding, Reorient patient, Room close to nurse's station, Toileting rounds     Medication Interventions: Bed/chair exit alarm, Evaluate medications/consider consulting pharmacy, Patient to call before getting OOB     Elimination Interventions: Bed/chair exit alarm, Call light in reach, Toileting schedule/hourly rounds

## 2017-09-06 NOTE — PROGRESS NOTES
CM spoke with pt's wife and son about choice of SNF. They informed this CM that they are still looking at facilities. CM reiterated the importance that they need to make a choice to send referral for discharge disposition. Vy Carson RN CRM    0526 SAMINA spoke with pt's son and his family has decided to have referral sent to [facility]. Referral sent via CC. Vy Carson RN CRM    1237 CM noted [facility] accepted pt for admission. CM spoke with bedside nurse and reviewed PT notes. Pt will not be safe to transport via wheelchair van due to debility. CM called and left message for son on his cell, Ky Hatchet phone # [phone], that pt had been accepted to [facility] and he will need ambulance transport due to debility as he is unsafe to transport via wheelchair van. CM sent request via [employer] to [facility] and awaiting  time. Vy Carson RN CRM    1400 SAMINA arranged  for 1500 by [facility]. CM informed bedside nurse, facility, and family of  time. Bedside nurse will need to call report to phone # [phone] and ask for [facility]. MAR and KARDEX need to be printed and sent with pt. Vy Carson RN CRM    1428 SAMINA sent inbasket message to NN in PCP office, to inform of discharge plan.   Vy Carson RN CRM

## 2017-09-06 NOTE — DISCHARGE SUMMARY
Discharge Summary       PATIENT ID: Anabel Patiño  MRN: [id number]   YOB: 1931    DATE OF ADMISSION: 8/31/2017 11:36 AM    DATE OF DISCHARGE: 9/6/17   PRIMARY CARE PROVIDER: Justine Bacon MD     ATTENDING PHYSICIAN: Dr. Tiki Whiting  DISCHARGING PROVIDER: Iveth Yang MD    To contact this individual call [phone] and ask the  to page. If unavailable ask to be transferred the Adult Hospitalist Department. CONSULTATIONS: IP CONSULT TO ORTHOPEDIC SURGERY  IP CONSULT TO INFECTIOUS DISEASES    PROCEDURES/SURGERIES: Procedure(s):  INCISION AND DRAINAGE PREPATELLAR BURSA  RIGHT KNEE    ADMITTING [address] [place] [address] COURSE:     *Septic right prepatellar bursitis,the knee joint is spared per surgeon  - he underwent I &D in the OR 9/1  - fluid culture with MSSA --> sensitive to cephalosporins  - covered with IV Ancef --> d/c on Keflex PO       *History of paroxysmal atrial fibrillation  - currently NSR  - continue Lopressor      *Hypertension   - stable on Lopressor      *Advanced dementia        PENDING TEST RESULTS:   At the time of discharge the following test results are still pending: none    FOLLOW UP APPOINTMENTS:    Follow-up Information     Follow up With Details Comments Rodo 1019 [facility]   [address] [place] [address] 7 2040 [facility],5Th Floor [address]  [address] 1 [facility]  [phone]             ADDITIONAL CARE RECOMMENDATIONS: PT/ OT eval and treat    DIET: Regular Diet    ACTIVITY: up with assistance    WOUND CARE: right knee as per protocol    EQUIPMENT needed: right knee immobilizer      DISCHARGE MEDICATIONS:  Current Discharge Medication List      START taking these medications    Details   cephALEXin (KEFLEX) 500 mg capsule Take 1 Cap by mouth four (4) times daily for 14 days.   Qty: 56 Cap, Refills: 0         CONTINUE these medications which have NOT CHANGED    Details cyanocobalamin 1,000 mcg tablet Take 1 Tab by mouth daily. Indications: PREVENTION OF VITAMIN B12 DEFICIENCY  Qty: 60 Tab, Refills: 2      acetaminophen (TYLENOL) 325 mg tablet Take 650 mg by mouth two (2) times daily as needed for Pain. alfuzosin SR (UROXATRAL) 10 mg SR tablet Take 10 mg by mouth every evening. aspirin delayed-release 81 mg tablet Take 81 mg by mouth daily. cholecalciferol (VITAMIN D3) 1,000 unit tablet Take 2,000 Units by mouth daily. docusate sodium (COLACE) 100 mg capsule Take 100 mg by mouth two (2) times a day. escitalopram oxalate (LEXAPRO) 10 mg tablet Take 10 mg by mouth every morning. metoprolol succinate (TOPROL-XL) 25 mg XL tablet Take 25 mg by mouth daily. psyllium (METAMUCIL) packet Take 1 Packet by mouth daily. NOTIFY YOUR PHYSICIAN FOR ANY OF THE FOLLOWING:   Fever over 101 degrees for 24 hours. Chest pain, shortness of breath, fever, chills, nausea, vomiting, diarrhea, change in mentation, falling, weakness, bleeding. Severe pain or pain not relieved by medications. Or, any other signs or symptoms that you may have questions about.     DISPOSITION:    Home With:   OT  PT  HH  RN      x Long term SNF/Inpatient Rehab    Independent/assisted living    Hospice    Other:       PATIENT CONDITION AT DISCHARGE:     Functional status    Poor    x Deconditioned     Independent      Cognition     Lucid     Forgetful    x Dementia      Catheters/lines (plus indication)    Souza     PICC     PEG    x None      Code status     Full code    x DNR      PHYSICAL EXAMINATION AT DISCHARGE:   Refer to Progress Note      CHRONIC MEDICAL DIAGNOSES:  Problem List as of 9/6/2017  Date Reviewed: 9/6/2017          Codes Class Noted - Resolved    Altered mental status ICD-10-CM: R41.82  ICD-9-CM: 780.97  8/1/2017 - Present        Bradycardia ICD-10-CM: R00.1  ICD-9-CM: 427.89  4/27/2016 - Present        S/P laparoscopic cholecystectomy ICD-10-CM: Z90.49  ICD-9-CM: V45.89  4/25/2016 - Present        Dementia ICD-10-CM: F03.90  ICD-9-CM: 294.20  3/2/2015 - Present        Vasovagal syncope ICD-10-CM: R55  ICD-9-CM: 780.2  12/31/2014 - Present        S/P placement of cardiac pacemaker ICD-10-CM: Z95.0  ICD-9-CM: V45.01  12/29/2014 - Present        CAD (coronary artery disease) ICD-10-CM: I25.10  ICD-9-CM: 414.00  10/10/2014 - Present        Atrial fibrillation (HCC) ICD-10-CM: I48.91  ICD-9-CM: 427.31  10/10/2014 - Present        Aortic stenosis ICD-10-CM: I35.0  ICD-9-CM: 424.1  10/10/2014 - Present    Overview Signed 12/31/2014  1:09 PM by Allyson Villalobos MD     mild             Vitamin D deficiency ICD-10-CM: E55.9  ICD-9-CM: 268.9  2/24/2014 - Present        Nocturia ICD-10-CM: R35.1  ICD-9-CM: 788.43  5/28/2013 - Present        Chronic left shoulder pain ICD-10-CM: M25.512, G89.29  ICD-9-CM: 719.41, 338.29  5/11/2012 - Present        Tachycardia-bradycardia syndrome ([facility] [address] ICD-10-CM: I49.5  ICD-9-CM: 427.81  4/12/2012 - Present    Overview Signed 4/27/2012  7:48 AM by Ivone Calvert MD     S/p dual chamber [employer] pacemaker implant 4/26/2012             Sick sinus syndrome ([address] ICD-10-CM: I49.5  ICD-9-CM: 427.81  4/12/2012 - Present        Constipation ICD-10-CM: K59.00  ICD-9-CM: 564.00  12/6/2011 - Present        RLS (restless legs syndrome) ICD-10-CM: G25.81  ICD-9-CM: 333.94  6/14/2011 - Present        Arthritis ICD-10-CM: M19.90  ICD-9-CM: 716.90  12/30/2010 - Present        GERD (gastroesophageal reflux disease) ICD-10-CM: K21.9  ICD-9-CM: 530.81  12/30/2010 - Present        Urine incontinence ICD-10-CM: R32  ICD-9-CM: 788.30  10/28/2010 - Present        Essential hypertension ICD-10-CM: I10  ICD-9-CM: 401.9  2/18/2010 - Present        Pure hypercholesterolemia ICD-10-CM: E78.00  ICD-9-CM: 272.0  2/18/2010 - Present        Hyperglycemia ICD-10-CM: R73.9  ICD-9-CM: 790.29  2/18/2010 - Present        * (Principal)RESOLVED: Arthritis, septic, knee [facility]) ICD-10-CM: M00.9  ICD-9-CM: 711.06  8/31/2017 - 9/6/2017        RESOLVED: Gallbladder calculus without cholecystitis ICD-10-CM: K80.20  ICD-9-CM: 574.20  4/12/2016 - 4/25/2016        RESOLVED: Syncope ICD-10-CM: R55  ICD-9-CM: 780.2  12/29/2014 - 1/5/2015        RESOLVED: Syncope and collapse ICD-10-CM: R55  ICD-9-CM: 780.2  12/29/2014 - 1/5/2015        RESOLVED: Insomnia ICD-10-CM: G47.00  ICD-9-CM: 780.52  5/11/2012 - 11/3/2014        RESOLVED: Shoulder pain ICD-10-CM: M25.519  ICD-9-CM: 719.41  12/6/2011 - 5/11/2012        RESOLVED: Trigeminal neuralgia ICD-10-CM: G50.0  ICD-9-CM: 350.1  10/14/2010 - 5/28/2013        RESOLVED: Memory disturbance ICD-10-CM: R41.3  ICD-9-CM: 780.93  9/9/2010 - 5/22/2017        RESOLVED: Allergic rhinitis, cause unspecified ICD-10-CM: J30.9  ICD-9-CM: 477.9  2/11/2010 - 5/22/2017        RESOLVED: Right hip pain ICD-10-CM: M25.551  ICD-9-CM: 719.45  1/11/2010 - 5/22/2017              Greater than 30 minutes were spent with the patient on counseling and coordination of care    Signed:   Esdras Orellana MD  9/6/2017  2:29 PM

## 2017-09-06 NOTE — DISCHARGE INSTRUCTIONS
Discharge Instructions       PATIENT ID: Dale Early  MRN: [id number]   YOB: 1931    DATE OF ADMISSION: 8/31/2017 11:36 AM    DATE OF DISCHARGE: 9/6/2017    PRIMARY CARE PROVIDER: Manda Ashton MD     ATTENDING PHYSICIAN: Yanelis Lerner MD  DISCHARGING PROVIDER: Yanelis Lerner MD    To contact this individual call [phone] and ask the  to page. If unavailable ask to be transferred the Adult Hospitalist Department. DISCHARGE DIAGNOSES   - prepatellar bursitis of the right knee    CONSULTATIONS: IP CONSULT TO ORTHOPEDIC SURGERY  IP CONSULT TO INFECTIOUS DISEASES    PROCEDURES/SURGERIES: Procedure(s):  INCISION AND DRAINAGE PREPATELLAR BURSA  RIGHT KNEE    PENDING TEST RESULTS:   At the time of discharge the following test results are still pending: none    FOLLOW UP APPOINTMENTS:   Follow-up Information     Follow up With Details Comments Rodo 1019 [facility]   28[address] [facility] 7 9434 [facility],5Th Floor [address]  [address] [place] [address]  [phone]             ADDITIONAL CARE RECOMMENDATIONS:   · PT/ OT eval and treat    DIET: Regular Diet    ACTIVITY: up with assistance    WOUND CARE: right knee as per protocol    EQUIPMENT needed: right knee immobilizer      DISCHARGE MEDICATIONS:   See Medication Reconciliation Form    · It is important that you take the medication exactly as they are prescribed. · Keep your medication in the bottles provided by the pharmacist and keep a list of the medication names, dosages, and times to be taken in your wallet. · Do not take other medications without consulting your doctor. NOTIFY YOUR PHYSICIAN FOR ANY OF THE FOLLOWING:   Fever over 101 degrees for 24 hours. Chest pain, shortness of breath, fever, chills, nausea, vomiting, diarrhea, change in mentation, falling, weakness, bleeding. Severe pain or pain not relieved by medications.   Or, any other signs or symptoms that you may have questions about.       DISPOSITION:    Home With:  x OT x PT  HH  RN      x SNF/Inpatient Rehab/LTAC    Independent/assisted living    Hospice    Other:     CDMP Checked:   Yes x     PROBLEM LIST Updated:  Yes x       Signed:   Ashutosh Pedraza MD  9/6/2017  2:27 PM

## 2017-09-06 NOTE — PROGRESS NOTES
Patient seen and examined. Going to SNF today. Doing well.     AF, VSS  NAD  RRR    A/P:) plan to go to SNF today

## 2017-09-06 NOTE — PROGRESS NOTES
Bedside and Verbal shift change report given to Kathryn Crowe RN (oncoming nurse) by Anayeli Chowdhury RN (offgoing nurse). Report included the following information SBAR.

## 2017-09-06 NOTE — PROGRESS NOTES
Report called to [facility], spoke with nurse Mirella Cunningham.  Patient transported via ambulance with all belongings

## 2017-09-06 NOTE — PROGRESS NOTES
ID Progress Note  2017    Subjective:     Afebrile. He tolerated cefazolin. He says he is not in pain. Objective:     Vitals:   Visit Vitals    /85    Pulse 93    Temp 98.9 °F (37.2 °C)    Resp 16    Ht 6' 1.5\" (1.867 m)    Wt 90.5 kg (199 lb 8.3 oz)    SpO2 92%    BMI 25.97 kg/m2        Tmax:  Temp (24hrs), Av.9 °F (37.2 °C), Min:98.6 °F (37 °C), Max:99.1 °F (37.3 °C)      Exam:    Not in distress  Lungs: clear to auscultation  Heart: s1, s2, no murmurs   Abdomen: soft, nontender    Labs:   Lab Results   Component Value Date/Time    WBC 6.3 2017 02:33 AM    HGB 11.5 2017 02:33 AM    HCT 34.9 2017 02:33 AM    PLATELET 472  02:33 AM    MCV 87.3 2017 02:33 AM     Lab Results   Component Value Date/Time    Sodium 137 2017 02:16 AM    Potassium 3.7 2017 02:10 AM    Chloride 103 2017 02:16 AM    CO2 26 2017 02:16 AM    Anion gap 8 2017 02:16 AM    Glucose 108 2017 02:16 AM    BUN 10 2017 02:16 AM    Creatinine 0.76 2017 02:16 AM    BUN/Creatinine ratio 13 2017 02:16 AM    GFR est AA >60 2017 02:16 AM    GFR est non-AA >60 2017 02:16 AM    Calcium 9.0 2017 02:16 AM    Bilirubin, total 0.7 2017 11:56 AM    AST (SGOT) 16 2017 11:56 AM    Alk. phosphatase 88 2017 11:56 AM    Protein, total 8.2 2017 11:56 AM    Albumin 3.2 2017 11:56 AM    Globulin 5.0 2017 11:56 AM    A-G Ratio 0.6 2017 11:56 AM    ALT (SGPT) 27 2017 11:56 AM       Assessment:     1. Septic prepatellar bursitis with MSSA. 2. Severe cognitive impairment. 3. History of atrial fibrillation. 4. Hypertension. Recommendations:     I spoke with Dr. Endy Lomas. The right knee joint is not involved. He would have received 7 days by the end of today. He can be switched to keflex 500 mg 4x a day for 14 more days until .      Sandra Domingo MD

## 2017-09-07 NOTE — PROGRESS NOTES
[address] Discharge [facility] Follow-Up    [facility] referral source- [place] Report (17), CDR (17) MSSP. Hospitalized @ [facility]. Christen[facility] 17-17. Diagnoses:  Septic right prepatellar bursitis,the knee joint is spared per surgeon  History of paroxysmal atrial fibrillation  Hypertension   Advanced dementia      RRAT score: 25 High    Procedure:  17- INCISION AND DRAINAGE PREPATELLAR BURSA  RIGHT KNEE. Surgeon Dr Faye Gonzalez      Discharge Instructions/Plans:  - Disposition- SNF-[facility]  - Rx-   START taking these medications     Details   cephALEXin (KEFLEX) 500 mg capsule Take 1 Cap by mouth four (4) times daily for 14 days. Qty: 56 Cap, Refills: 0         - F/U with [facility]  - F/U with PCP    Per D/C Summary review no Ortho f/u mentioned. Per 17 Ortho Progress note \"incision healing well, decreasing swelling and redness, minimal drainage, OK for discharge from ortho standpoint, continue po ABX per ID, remove stitches in a week, follow-up in office in 2 weeks\"    NN post hospital interactive contact done by telephone within 2 business days of discharge     Spoke with SNF nurse & pt's daughter. Pt ID verified with 2 identifiers, name and . NN introduction. Reason for call stated. Support System:  Wife, daughter, son    ACP - on file. Dated 1993. Primary- Cori Etienne (wife). 2nd- Hu Horan (daughter Urszula Dao verified this is she. Has remarried since ACP document completed).     NN contact # given to call as needed. Goals      Attends follow-up appointments as directed. 17 4:34 PM- spoke with son Phoenix Nik. Son to f/u with SNF re suture removal. Son to contact [facility], # given, to schedule 2 week f/u appt. Son to f/u with SNF re transportation arrangements for appt. Son to f/u with this NN to update. NN tel # reviewed-ID  17 3:32 PM- spoke with daughter Jarad Valdes.  Daughter unaware of suture removal in 1 week & 2 week Ortho f/u. Daughter requested NN contact pt's son Branda Brunner [facility]) Jcae Greene @ [phone]. Son was @ hospital when pt discharged. Advised daughter to call [facility] & schedule 2 week f/u appt. Inform SNF of appt date/time. Discuss transportation arrangements. Daughter verbalized understanding. NN tel # given to call as needed-ID  9/7/17- as per Ortho stitches to be removed in 1 week. 2 week Ortho f/u in office. PCP f/u  post SNF d/c TBD-ID       Identification of barriers to adherence to a plan of care such as inability to afford medications, lack of insurance, lack of transportation, etc.      Knowledge and adherence of prescribed medication (ie. action, side effects, missed dose, etc.).            9/7/17- post hosp d/c med reconciliation done with SNF nurse Margy Mata. Meds as per d/cmed list with exception of ABX Keflex. As per SNF nurse ABX changed b/c of allergy hx. Pt taking Cipro 500 mg BID x 7 days-ID       Prevent complications post hospitalization. 9/7/17- this NN to monitor/follow pt during post hosp 30 day MARICEL period. Collaborate with SNF staff prn. Spoke with SNF nurse Margy Mata. PT/OT evals done today. Wound Care nurse to see pt today. Pt \"clear-minded, A&O today\". Pt needs to be reminded he can't stand on his own. Immobilizer to right knee in place. SNF provider assigned to pt is Dr Alisa Kebede. Call transferred to [employer] office. Message left for SNF [facility]       Supportive resources in place to maintain patient in the community (ie. Home Health, DME equipment, refer to, medication assistant plan, etc.)            9/7/17- 3:32 PM- spoke with pt's daughter Robin Santiago [facility]). Plan is for pt to return to [facility] after SNF. Family met with [facility] staff today & updated on pt's status. Daughter reported family met with PT/OT @ SNF today. Estimated LOS 4 weeks. -ID  9/7/17- pt at [facility] prior to hospitalization.  F/U with family & SNF re status of transition to shelter post SNF d/c-ID NN Plan:  - Next NN f/u ~ 5 days- update from SNF, son and/or daughter on suture removal, Ortho appt.

## 2017-09-13 NOTE — PROGRESS NOTES
Community Care Team Documentation for Patient in [facility]  Initial Follow Up       Patient was admitted to [facility] from 8/31 to 9/6. Patient was discharged to [facility], [facility], on 9/6 (date). See previous [place] Care Team documentation under Patient Outreach. Hospital Discharge diagnosis:  Septic right prepatellar bursitis    RRAT score:  25    Advance Medical Directive on file in EMR? Advanced directive: On file  Total Hospitalizations/ED visits last 6 months? IP - 2; ED - 0    PCP : Bernadine Deutsch MD  Nurse Navigator in PCP office: Paty Webber  Note routed to Nurse Navigator team.    Per Chinmay Philip at SNF, Reviewed [place] back questions to ensure that patient arrived at SNF safely with admission packet in order. SNF aware of pacemaker check appointment 10/12 at 11:00AM with Dr Lance Ferrell. Confirmed pt is from [facility]. Plan to return. Pt open to PT/OT and currently max assist to dependent with all therapy tasks due to dementia. Reviewed chart noting PCP office attempting to get a WC and hospital bed for pt. SNF to review need for DME with therapy. LOS TBD. SNF Attending:  Dr. Romero Kirk    Medications were not reconciled and general patient assessment was not completed during this skilled nursing facility outreach.        [place]

## 2017-09-14 NOTE — PROGRESS NOTES
NNTOCIP- F/U Progress Note    Goals Addressed             Most Recent     Prevent complications post hospitalization. On track (2/4/2015)             9/14/17- received documentation from [facility] re update on pt's SNF status. [facility] to follow pt during SNF stay. NN will collaborate with [facility] for SNF [facility] to [facility] handoff to assist as needed-ID    9/7/17- this NN to monitor/follow pt during post hosp 30 day MARICEL period. Collaborate with SNF staff prn. Spoke with SNF nurse Neris Gutierrez. PT/OT evals done today. Wound Care nurse to see pt today. Pt \"clear-minded, A&O today\". Pt needs to be reminded he can't stand on his own. Immobilizer to right knee in place. SNF provider assigned to pt is Dr Jennifer Lal. Call transferred to [place] office.  Message left for SNF SW [place]          NN Plan:  - Monitor EMR for CCT notes re SNF status

## 2017-09-26 NOTE — PROGRESS NOTES
NNTOCIP F/U Progress Note    Goals Addressed             Most Recent     Prevent complications post hospitalization. On track (2/4/2015)             9/26/17- received documentation from [facility] on 9/20/17 re update on pt's SNF status. received documentation from [facility] re update on pt's SNF status. Pt open to PT/OT and currently max assist to dependent with all therapy tasks due to dementia. Therapy is not recommending pt return to [facility]. Pt's family looking into LTC. LOS TBD. [facility] to continue to follow pt during SNF stay. NN will collaborate with [facility] for SNF [facility] to [facility] handoff to assist as needed-ID    9/14/17- received documentation from [address] [place] [address] re update on pt's SNF status. [facility] to follow pt during SNF stay. NN will collaborate with [facility] for SNF [facility] to [facility] handoff to assist as needed-ID    9/7/17- this NN to monitor/follow pt during post hosp 30 day MARICEL period. Collaborate with SNF staff prn. Spoke with SNF nurse Rilla Severs. PT/OT evals done today. Wound Care nurse to see pt today. Pt \"clear-minded, A&O today\". Pt needs to be reminded he can't stand on his own. Immobilizer to right knee in place. SNF provider assigned to pt is Dr Joann Rosa. Call transferred to [employer] office. Message left for SNF [facility] [place]          NN Plan:  - Continue collaboration with [facility] re SNF d/c plan & disposition.

## 2017-09-27 NOTE — PROGRESS NOTES
Community Care Team Documentation for Patient in [facility]  Subsequent Follow up     Patient remains at [facility] ([facility]). See previous [facility] Team notes. RRAT score: 25    PCP : Alireza Crews MD  Nurse Navigator in PCP office: Madalyn [place]    PCP follow up appointment:  Not yet scheduled    Per Rinda Litten at [facility], Therapy is ongoing; however, patient making limited progress with PT/OT related to dementia. Patient will likely not be able to return to [facility] and will need LTC. Disposition/LOS TBD. Anticipated discharge date from SNF:  TBD    SNF discharge plan:  TBD    Medications were not reconciled and general patient assessment was not completed during this skilled nursing facility outreach.      Dario Montanez, MSN, RN, ACNS-BC, [facility]  Nurse Navigator, [employer] [phone]

## 2017-10-04 NOTE — PROGRESS NOTES
Community Care Team Documentation for Patient in [facility]  Subsequent Note    Per SNF staff, patient is now on Hospice at [facility] ([facility] See previous [facility] Team notes. CCT will sign off at this time. PCP : Katie Ureña MD    Nurse Navigator in PCP office: Rajat Campa  Note routed to Nurse Navigator team.    Medications were not reconciled and general patient assessment was not completed during this skilled nursing facility outreach.      Zackary Rangel, MSN, RN, ACNS-BC, [facility]  Nurse Navigator, [employer] [phone]

## 2017-10-12 NOTE — MR AVS SNAPSHOT
Visit Information Date & Time Provider Department Dept. Phone Encounter #  
 10/12/2017 11:20 AM Amrit Walsh MD [facility] [place] [phone] [id number] Your Appointments 10/25/2018 10:45 AM  
PACEMAKER with MONA3SUMEET [facility] (NAV SCHEDULING) Appt Note: sjm threshold/rc/Christian annual b  [place] [address] [facility] [address]  
[address] [facility]  
  
    
 10/25/2018 11:00 AM  
ESTABLISHED PATIENT with Amrit Walsh MD  
[facility] ([facility]) Appt Note: sjm threshold/rc/Christian annual b  [place] [address] [facility] [address]  
[address] [facility] [address]  
  
    
  
 1/17/2018  8:00 AM  
REMOTE OFFICE VISIT with Saskia Combs [facility] (NAV SCHEDULING) Appt Note: merlin PPI/rc b 10-12-17  
 [address]  
[address] [facility] [address]  
  
    
 4/16/2018  1:00 PM  
REMOTE OFFICE VISIT with Saskia Combs [facility] (NAV SCHEDULING) Appt Note: Merlin PPI/rc b  
 [address]  
[phone]  
  
    
 7/18/2018 10:30 AM  
REMOTE OFFICE VISIT with Saskia Combs [facility] (NAV SCHEDULING) Appt Note: merlin PPI/rc b  
 [address]  
[phone] Upcoming Health Maintenance Date Due  
 GLAUCOMA SCREENING Q2Y 11/26/2017* INFLUENZA AGE 9 TO ADULT 11/26/2017* DTaP/Tdap/Td series (1 - Tdap) 9/1/2021* MEDICARE YEARLY EXAM 5/23/2018 *Topic was postponed. The date shown is not the original due date.    
  
Allergies as of 10/12/2017  Review Complete On: 10/12/2017 By: Manish Chavarria Darryl Severity Noted Reaction Type Reactions Erythromycin  08/06/2009    Nausea Only Current Immunizations  Reviewed on 10/26/2015 Name Date H1N1 FLU VACCINE 1/11/2010 Influenza High Dose Vaccine PF 9/18/2015, 9/12/2014 Influenza Vaccine 9/29/2014 Influenza Vaccine Split 9/4/2010 Pneumococcal Conjugate (PCV-13) 11/3/2014 TD Vaccine 9/1/2011, 9/20/2000 ZZZ-RETIRED (DO NOT USE) Pneumococcal Vaccine (Unspecified Type) 10/1/1998 Zoster 3/26/2007 Not reviewed this visit You Were Diagnosed With   
  
 Codes Comments Coronary artery disease involving native coronary artery of native heart without angina pectoris    -  Primary ICD-10-CM: I25.10 ICD-9-CM: 414.01 Paroxysmal atrial fibrillation (HCC)     ICD-10-CM: I48.0 ICD-9-CM: 427.31 Essential hypertension     ICD-10-CM: I10 
ICD-9-CM: 401.9 Hypokalemia     ICD-10-CM: E87.6 ICD-9-CM: 276.8 Vitals BP Pulse Resp Height(growth percentile) Smoking Status 122/76 (BP 1 Location: Left arm, BP Patient Position: Sitting) 86 16 6' 1.5\" (1.867 m) Former Smoker Vitals History Preferred Pharmacy Pharmacy Name Phone [address] [place] [address], [facility] [address] [phone] Your Updated Medication List  
  
   
This list is accurate as of: 10/12/17 12:20 PM.  Always use your most recent med list.  
  
  
  
  
 acetaminophen 325 mg tablet Commonly known as:  TYLENOL Take 650 mg by mouth two (2) times daily as needed for Pain. alfuzosin SR 10 mg SR tablet Commonly known as:  Nathan Oliva Take 10 mg by mouth every evening. aspirin delayed-release 81 mg tablet Take 81 mg by mouth daily. cholecalciferol 1,000 unit tablet Commonly known as:  VITAMIN D3 Take 2,000 Units by mouth daily. cyanocobalamin 1,000 mcg tablet Take 1 Tab by mouth daily.  Indications: PREVENTION OF VITAMIN B12 DEFICIENCY  
  
 docusate sodium 100 mg capsule Commonly known as:  Vermell Nones Take 100 mg by mouth two (2) times a day. escitalopram oxalate 10 mg tablet Commonly known as:  Cherre Jews Take 10 mg by mouth every morning. metoprolol succinate 25 mg XL tablet Commonly known as:  TOPROL-XL Take 25 mg by mouth daily. psyllium packet Commonly known as:  METAMUCIL Take 1 Packet by mouth daily. We Performed the Following AMB POC EKG ROUTINE W/ 12 LEADS, INTER & REP [93180 CPT(R)] MAGNESIUM [id number] CPT(R)] METABOLIC PANEL, BASIC [47585 CPT(R)] Patient Instructions Please start eating a banana daily to keep potassium level up Have labs drawn in 6 weeks to check potassium Introducing [facility] SERVICES! Dear [facility]: Thank you for requesting a [employer] account. Our records indicate that you have previously registered for a [employer] account but its currently inactive. Please call our [employer] support line at [phone]. Additional Information If you have questions, please visit the Frequently Asked Questions section of the [employer] website at [email or web address] Remember, [employer] is NOT to be used for urgent needs. For medical emergencies, dial 911. Now available from your iPhone and Android! Please provide this summary of care documentation to your next provider. Your primary care clinician is listed as [id number] If you have any questions after today's visit, please call [phone].

## 2017-10-12 NOTE — PROGRESS NOTES
NN F/U Progress Note    - Received message on 10/4/17 from [facility]. Pt opened to Hospice @ [address]. Hospice Agency name unknown. No further f/u by CCT. - Contacted [facility]. Spoke with nurse. Informed pt not open to Hospice. - [facility]. Confirmed pt remains @ [facility]. Not open to Hospice. Plans to return to the [facility]. Goals Addressed             Most Recent     Prevent complications post hospitalization. On track (2/4/2015)             10/12/17- pt past 30 day post discharge (9/6/17) MARICEL period. No hospital admissions during MARICEL period. Remains @ [address] SNF. This NN received message on 10/4/17 from [facility] informing pt was opened to Hospice @ [facility]. Contacted SNF today. Spoke with nurse Celsa Hunter. Nurse reported pt not open to Hospice. Pt still doing PT. Oriented x1- to person. D/C date/disposition unknown. Suggested NN speak with Dir of [place]. Call transferred. Message left for [employer]-ID     9/26/17- received documentation from [facility] on 9/20/17 re update on pt's SNF status. received documentation from [facility] re update on pt's SNF status. Pt open to PT/OT and currently max as sist to dependent with all therapy tasks due to dementia. Therapy is not recommending pt return to [facility]. Pt's family looking into LTC. LOS TBD. CCT to continue to follow pt during SNF stay. NN will collaborate with [facility] for SNF MARICEL to LTC handoff to assist as needed-ID    9/14/17- received documentation from [address] [place] [address] re update on pt's SNF status. CCT to follow pt during SNF stay. NN will collaborate with [facility] for SNF MARICEL to [facility] handoff to assist as needed-ID    9/7/17- this NN to monitor/follow pt during post hosp 30 day MARICEL period. Collaborate with SNF staff prn. Spoke with SNF nurse Yoshi Brown. PT/OT evals done today. Wound Care nurse to see pt today. Pt \"clear-minded, A&O today\". Pt needs to be reminded he can't stand on his own. Immobilizer to right knee in place.  SNF provider assigned to pt is Dr Tammie Farrell. Call transferred to [employer] office. Message left for SNF SW Nesha Figueroa       Supportive resources in place to maintain patient in the community (ie. Home Health, DME equipment, refer to, medication assistant plan, etc.)   On track (1/2/2015)             10/12/17- spoke with pt's daughter Raphael [facility]). Confirmed pt is not open to Hospice. Reported pt has reached plateau in PT. Will probably need wheelchair for mobility. \"Alzheimer's has progressed\". Plan is for pt to return to the [facility]. \"We've been paying for his space there while he's been @ [facility]\". Possible SNF D/C \"next week\". Daughter requested update on order for WC. Per EMR review order placed by PCP during 8/29/17 ov. Advised to discuss with SNF PT how to proceed to obtain. Advised daughter speak with SNF SW for details on D/C plan/date. Discussed option of having the [facility] provider as pt's PCP. Daughter reported didn't want to make that decision when pt admitted to long-term. Will consider after pt returns to [facility]. Daughter verbalized understanding of information discussed today-ID     9/7/17- 3:32 PM- [facility] with [facility]'s [facility] Plan is for pt to return to [facility] after SNF. Family met with [facility] staff today & updated on pt's status. Daughter reported family met with PT/OT @ SNF today. Estimated LOS 4 weeks. -ID  9/7/17- pt at [facility] prior to hospitalization. F/U with family & SNF re status of transition to [facility] post SNF d/c-ID            Daughter has NN contact information for questions, concerns, needs.     Plan:   - Collaborate with CCT re resume SNF f/u till pt discharged  - Next NN f/u ~ 7 days with daughter  - NN to assist with MARICEL to long-term as needed

## 2017-10-12 NOTE — PROGRESS NOTES
[facility] [place]: Ira Daily  [phone]    HPI: Lazaro Gordon is an [de-identified]y.o. year-old who presents for follow up regarding his Atrial Fib and CAD. This note will not be viewable in [address] Family says he was hospitalized for a knee infection at [facility] and discharged to [facility] rehab 5 weeks ago  They say he is not progressing, doesn't walk anymore, sits in wheelchair all day  They have plans to move him to assisted living at [facility]  He doesn't answer many questions  Daughter and wife report that he has not reported any chest pain or dyspnea or palpitations  Device check today - he had 2.5 hours of AF in March 2017 and one 7 beat run of NSVT  BP well controlled today  He appears to have some advanced confusion/dementia  He and his wife met at [employer], used to play piano together  EKG NSR with PVC  Dementia seems to be progressing. A/P  1. HTN - well controlled on metoprolol  2. Dyslipidemia- currently off statin   4. Reflux- currently off PPI    5. Afib- paroxysmal, in NSR today, 2.5 hours in 3/17 on device check today, CHADS2VASC=3 (age and HTN) but patient is not interested in taking a more potent anticoagulant, on ASA  -follow up in 1 year due to difficulty with transfers  6. CAD - on ASA and metoprolol, not currently on a statin, nonobstructive CAD by cath  7. Fatigue- improved after pacemaker placement, unable to assess today    8. Bradycardia - s/p Pacer implant 4/12, pacer checks through this office  9. Aortic sclerosis - no stenosis by TTE 8/15   10. NSVT on pacer check, no symptoms, no signs of cad progression, continue metoprolol, advised him to eat a banana daily to keep K up, will check electrolytes in 6 weeks (given lab slip today)  11.  Hypokalemia - K 3.4 - 3.7, advised him to eat a banana daily, will recheck BMP in 6 weeks    8/15 EF 55%, ao sclerosis  12/14 EF 60, triv TR, Aov not well seen  7/13 Echo - LVEF 55-60%, mild MR, mild AS (mean gradient 17mmhg, AWA 1.3cm2), mild cLVH  5/13 EF 60% mild TR mild MR  Cath 4/12 LAD 40% ostial, RCA 40% prox, PDA 50%  3/12 EF 60, mild cLVh, mild As, mild TR, mild MR   3/12 Holter:  7 beat VT, pauses<2 seconds, PAC, PVC  Stress 3/12: afib with EKG changes, upsloping without perfusion defect    He  has a past medical history of Adhesive capsulitis of shoulder; Advance directive in chart (12/31/14); Arrhythmia; Atrial fibrillation ([facility] BPH; Cancer ([facility] (10/2010); Cancer ([facility] (8/2012); Constipation; Contact dermatitis and other eczema, due to unspecified cause; Dementia; Diverticular disease; DJD of shoulder; ED (erectile dysfunction); Fungal infection of nail (363075); Gallbladder calculus without cholecystitis (4/12/2016); GERD (gastroesophageal reflux disease); Headache(784.0); Hearing loss; Hip fracture, right ([facility] Hypertension; Insomnia; Left knee pain (5/27/14 7/28/16); Left shoulder pain (3/31/14  1/7/16); Nosebleed; OAB (overactive bladder); Osteopenia (3/2007); PAC (premature atrial contraction); Pacemaker; Panic attacks; PAT (paroxysmal atrial tachycardia) ([facility] Peripheral neuropathy; PVC (premature ventricular contraction); S/P laparoscopic cholecystectomy (4/25/2016); Shingles (9/2010); Tinnitus; and Varicocele. Cardiovascular ROS: no chest pain or dyspnea on exertion  Respiratory ROS: no cough, shortness of breath, or wheezing  Neurological ROS: no TIA or stroke symptoms  All other systems negative except as above. PE  Vitals: There is no height or weight on file to calculate BMI.   General appearance - appears ill, more confusion   Mental status - flat affect   Eyes - sclera anicteric, dry mucous membranes  Neck - supple  Lymphatics - not assessed   Chest - clear to auscultation, no wheezes, rales or rhonchi  Heart - normal rate, regular rhythm, normal S1, S2, 2/6 WENDY   Abdomen - soft, nontender, nondistended  Back exam - full range of motion, no tenderness  Neurological - cranial nerves II through XII grossly intact, no focal deficit  Musculoskeletal - no muscular tenderness noted, normal strength  Extremities - peripheral pulses normal, no pedal edema  Skin - normal coloration  no rashes    12 lead ECG: NSR with PVC    Recent Labs:  Lab Results   Component Value Date/Time    Cholesterol, total 177 08/01/2017 11:35 AM    HDL Cholesterol 49 08/01/2017 11:35 AM    LDL, calculated 110 08/01/2017 11:35 AM    Triglyceride 90 08/01/2017 11:35 AM    CHOL/HDL Ratio 3.6 08/01/2017 11:35 AM     Lab Results   Component Value Date/Time    Creatinine 0.76 09/03/2017 02:16 AM     Lab Results   Component Value Date/Time    BUN 10 09/03/2017 02:16 AM     Lab Results   Component Value Date/Time    Potassium 3.7 09/04/2017 02:10 AM     Lab Results   Component Value Date/Time    Hemoglobin A1c 5.7 06/05/2017 09:29 AM     Lab Results   Component Value Date/Time    HGB 11.5 09/02/2017 02:33 AM     Lab Results   Component Value Date/Time    PLATELET 089 26/23/4717 02:33 AM       Reviewed:  Past Medical History:   Diagnosis Date    Adhesive capsulitis of shoulder      Dhruv Page notes-injected 3/17/16    Advance directive in chart 12/31/14    SEE SCANNED Form    Arrhythmia     [facility]rial fibrillation ([facility]     BPH     [facility] Leisa Orellana    Cancer [facility]) 10/2010    basal cell [facility] Cancer [facility]) 8/2012    squamous cell    Constipation     Contact dermatitis and other eczema, due to unspecified cause     ak    Dementia     7/27/15 copy of neuropsych eval    Diverticular disease     dr Dheeraj RAMESH of shoulder     dr Sita Roy  7/22/15 notes    ED (erectile dysfunction)     [facility]    [facility] nail 175283    [facility]    Gallbladder calculus without cholecystitis 4/12/2016    GERD (gastroesophageal reflux disease)     reflux dr Hector Epley Headache(784.0)     migraines    Hearing loss     Hip fracture, right ([address]     Hypertension     Insomnia     Left knee pain 5/27/14 16    patellofemoral arthrosis of right knee  17 f/u    Left shoulder pain 3/31/14  1/7/16    [facility]D 17    Nosebleed     17 note from ENT Pauline Apgar May    OAB (overactive bladder)     [facility]    Osteopenia 3/2007    PAC (premature atrial contraction)     Pacemaker     7/29/15 note about pacer check    Panic attacks     PAT (paroxysmal atrial tachycardia) (HCC)     Peripheral neuropathy     PVC (premature ventricular contraction)     S/P laparoscopic cholecystectomy 2016    Shingles 2010    dr Kaden Mosley Tinnitus     Varicocele     dr Mario Merchant     History   Smoking Status    Former Smoker    Packs/day: 1.00    Years: 30.00    Types: Cigarettes    Quit date: 1982   Smokeless Tobacco    Never Used     History   Alcohol Use No     Allergies   Allergen Reactions    Erythromycin Nausea Only       Current Outpatient Prescriptions   Medication Sig    cyanocobalamin 1,000 mcg tablet Take 1 Tab by mouth daily. Indications: PREVENTION OF VITAMIN B12 DEFICIENCY    acetaminophen (TYLENOL) 325 mg tablet Take 650 mg by mouth two (2) times daily as needed for Pain.  alfuzosin SR (UROXATRAL) 10 mg SR tablet Take 10 mg by mouth every evening.  aspirin delayed-release 81 mg tablet Take 81 mg by mouth daily.  cholecalciferol (VITAMIN D3) 1,000 unit tablet Take 2,000 Units by mouth daily.  docusate sodium (COLACE) 100 mg capsule Take 100 mg by mouth two (2) times a day.  escitalopram oxalate (LEXAPRO) 10 mg tablet Take 10 mg by mouth every morning.  metoprolol succinate (TOPROL-XL) 25 mg XL tablet Take 25 mg by mouth daily.  psyllium (METAMUCIL) packet Take 1 Packet by mouth daily. No current facility-administered medications for this visit.         Sona Mccloud MD  [facility] and [facility] [place]  [address] [facility] [address]  [address]

## 2017-10-12 NOTE — PATIENT INSTRUCTIONS
Please start eating a banana daily to keep potassium level up  Have labs drawn in 6 weeks to check potassium

## 2017-10-16 PROBLEM — F03.90 DEMENTIA WITHOUT BEHAVIORAL DISTURBANCE (HCC): Status: ACTIVE | Noted: 2017-01-01

## 2017-10-18 NOTE — PROGRESS NOTES
Community Care Team Documentation for Patient in [facility]  Discharge Note    Per SNF staff, [facility] ([facility]). See previous [facility] Team notes. PCP : Ebony Ortiz MD    PCP RUSTAM GARZA follow up appointment:  Not yet scheduled - PCP nurse navigator to schedule as needed     Nurse Navigator in PCP office: Jann Hendrix  Note routed to Nurse Navigator team.    Per Jatin Clements at [facility], Patient was not in hospice at the SNF. Patient was DC today 10/18 to [facility]. Medications were not reconciled and general patient assessment was not completed during this skilled nursing facility outreach.      Nate Lord, MSN, RN, ACNS-BC, [facility]  Nurse Navigator, [facility] [phone]

## 2017-10-18 NOTE — Clinical Note
Iris, please contact patient/family to see if he will change to a PCP at the [facility] or follow with Dr. Yenny Ku. Thanks. Edwin

## 2017-10-19 NOTE — PROGRESS NOTES
NN Note    -Received message from [address] NN T May. Pt discharged from [facility] 10/18/17. Returned to [facility]. - Attempted to contact pt's daughter. Message left. - Spoke with [facility] Manager/Carl Plummer. Goals Addressed             Most Recent     Attends follow-up appointments as directed. On track (2015)             10/19/17- pt was d/c'd from [facility] 10/18/17. Returned to [facility]. Spoke with [facility], [phone], @ [facility] location. Pt ID verified with 2 identifiers, name and . Nn introduction. Reason for call stated. Per Vernell Crockett family has requested change in PCP to facility provider. Facility provider through [facility] \"b/c they do house calls\". Paperwork faxed. Awaiting response as to which provider will be assigned to pt. NN advised to inform [facility] of priority need for pt to establish with new PCP b/c of recent hospitalization & SNF. Vernell Crockett verbalized understanding. Recommended NN f/u with her on Tues 10/24/17 to f/u on PCP status. NN contact information given-ID     17 4:34 PM- spoke with son Justice Craig. Son to f/u with SNF re suture removal. [facility], # given, to schedule 2 week f/u appt. Son to f/u with SNF re transportation arrangements for appt. Son to f/u with this NN to update. NN tel # reviewed-ID  17 3:32 PM- spoke with daughter Sarah Gustafson. Daughter unaware of suture removal in 1 week & 2 week Ortho f/u. Daughter requested NN contact pt's son Justice Craig [facility]) Lei Brown @ [phone]. Son was @ hospital when pt discharged. Advised daughter to call [facility] & schedule 2 week f/u appt. Inform SNF of appt date/time. Discuss transportation arrangements. Daughter verbalized understanding. NN tel # given to call as needed-ID  17- as per Ortho stitches to be removed in 1 week. 2 week Ortho f/u in office.  PCP f/u  post SNF d/c TBD-ID       Identification of barriers to adherence to a plan of care such as inability to afford medications, lack of insurance, lack of transportation, etc.   On track (4/15/2016)             10/19/17- as reported by [facility] staff Blossom Whitaker pt requires max assist with all ADLs with exception of eating which is with supervision. Mobility status- pt not walking. Uses wheelchair. Mental status- some Dementia. At times able to have a limited conversation. Due to pt's condition family has requested [facility] provider become PCP. Difficult for family to transport pt to current PCP office-ID        Knowledge and adherence of prescribed medication (ie. action, side effects, missed dose, etc.). On track (4/15/2016)             10/19/17- pt returned to [facility] from [facility] on 10/18/17. No med reconciliation done during call with [facility] Manager/Carl Paulson. Blossom Whitaker didn't have pt's med list readily available. Blossom Whitaker confirmed did receive a Med list from [facility]. Blossom Sherman to fax copy of paperwork from [facility] to this NN @ fax [phone]     9/7/17- post hosp d/c med reconciliation done with SNF nurse Darin Galloway. Meds as per d/cmed list with exception of ABX Keflex. As per SNF nurse ABX changed b/c of allergy hx. Pt taking Cipro 500 mg BID x 7 days-ID              NN contact information given to [facility] Manager/Carl Paulson.     Plan:  - NN to contact [facility] ~ 5 days for update on pt's PCP status

## 2017-10-31 NOTE — PROGRESS NOTES
AT [facility] noted on pacer, seen Dr Helio Albert 10/16/17  Future Appointments  Date Time Provider Jessy Nila   1/17/2018 8:00 AM REMOTE1, [address]   4/16/2018 1:00 PM REMOTE1, [address]   7/18/2018 10:30 AM REMOTE1, SUMEET [facility] SCHED   10/25/2018 10:45 AM PACEMAKER3, SUMEET [facility] SCHED   10/25/2018 11:00 AM Javy Pace  [address]

## 2017-11-01 NOTE — TELEPHONE ENCOUNTER
Jeannine Zamarripa from [facility] health calling on behalf of patient. Says she got a call from [address] at [facility] that patient is not eating, is spitting out his food and is holding food in his mouth. She is requesting a return call to discuss. Her contact # is [phone].

## 2017-11-02 NOTE — PROGRESS NOTES
NN F/U Progress Note    -Received message from Dr Jane Haley re PCP Status, ? Hospice Referral  - Attempted to contact pt's dtr Cruz Lloyd. Message left  - Spoke with [facility] Alvaro Donald  Ena Garcia  - Call from pt's dtr [address] Cuevas             Most Recent     Attends follow-up appointments as directed. On track (2/4/2015)             11/27/17- @ 11:00 AM- call from [address] [place] [address]. Spoke with dtr Cruz Lloyd. Informed of pt's condition & recommendation as per [facility] nurse. Dtr agreeable with transitioning pt to Hospice. As per Manager senior living works with [facility]. Dtr agreeable. This NN to message Dr Jane Haley re Hospice referral order. Order to be faxed to senior living @ [phone] attn Marianela-ID. 11/2/17- @ 10:48 AM- call to senior living Manager Ena Garcia. Discussed NN conversation with [facility] Nurse. NN recommendation Marianela contact dtr and/or wife. Discuss pt's condition as reported by [facility] nurse. Discuss Hospice recommendation. senior living Manager to f/u with this NN with outcome-ID    11/2/17 @ 10:30 AM-call from [address]. \"Currently @ senior living visiting pt. Pt more lethargic, not eating, difficulty w/ swallowing. Condition worsened. No SN needs @ this time. D/c'd from [facility] SN today. Hospice appropriate if family agrees\". NN advised of conversation w/ senior living Manager & attempt to contact dtr. This NN to contact [facility] office [phone] with update. [facility] nurse verbalized appreciation for NN collaboration-ID     11/2/17- Following message forwarded by Dr Floyd Boykin from [facility] home health calling on behalf of patient. Says she got a call from [address] at [facility] that patient is not eating, is spitting out his food and is holding food in his mouth. She is requesting a return call to discuss. Her contact # is [phone]. Spoke with Ena Garcia @ [facility]. Pt on list to be seen by a provider from [facility] to establish as new PCP. Ena Garcia reported there are several [facility] locations.  Will contact [facility] after this call to request if pt could be seen ASAP. Will inform [facility] of pt's current status & Hospice recommendation. [facility] may not see pt then. Hospice provider would become pt's PCP. Would then need referral for Hospice from current PCP, Dr Jennifer Brand. Marianela to f/u with this NN in approximately 30 mins to update. Return call to Berta Ham with [facility] deferred till then (this information also documented in NN Patient Outreach Encounter)-ID.    10/19/17- pt was d/c'd from [facility] SNF 10/18/17. Returned to [facility]. Spoke with [facility], [phone], @ [facility] location. Pt ID verified with 2 identifiers, name and . Nn introduction. Reason for call stated. Per Karli Flores family has requested change in PCP to facility provider. Facility provider through [facility] \"b/c they do house calls\". Paperwork faxed. Awaiting response as to which provider will be assigned to pt. NN advised to inform [facility] of priority need for pt to establish with new PCP b/c of recent hospitalization & SNF. Karli Flores verbalized understanding. Recommended NN f/u with her on Tues 10/24/17 to f/u on PCP status. NN contact information given-ID     17 4:34 PM- spoke with son Sailaja Mcnamara. Son to f/u with SNF re suture removal. Son to contact [facility], # given, to schedule 2 week f/u appt. Son to f/u with SNF re transportation arrangements for appt. Son to f/u with this NN to update. NN tel # reviewed-ID  17 3:32 PM- spoke with daughter Anabela Wright. Daughter unaware of suture removal in 1 week & 2 week Ortho f/u. Daughter requested NN contact pt's son Sailaja Mcnamara [facility]) Radha Crowe @ [phone]. Son was @ hospital when pt discharged. Advised daughter to call [facility] & schedule 2 week f/u appt. Inform SNF of appt date/time. Discuss transportation arrangements. Daughter verbalized understanding. NN tel # given to call as needed-ID  17- as per Ortho stitches to be removed in 1 week. 2 week Orth[facility] f/u in office.  PCP f/u post SNF d/c TBD-ID       Prepare patients and caregivers for end of life decisions (ie. need for hospice, pain management, symptom relief, advance directives etc.)                11/2/17@ 2:49 PM- Hospice referral order faxed to [employer] [phone]. Receipt confirmed by Alex @ correction. As per Alex initial Hospice assessment to be done today by [facility]. Alex to contact this NN to confirm Hospice start of care & provider name managing pt's care-ID    [email or web address]- call from pt's dtr Cruz Lloyd. Confirmed her conversation with correction Manager re transitioning pt to Hospice. Dtr stated understood & agreeable. NN inquired had she discussed with her mother (pt's wife). Dtr acknowledged past conversations with her mother. \"She realizes he's not going to last much longer\". Dtr acknowledged both she & mother agreeable with pt's wishes stated in ACP document. NN advised when pt is opened to Hospice his care will be managed by Hospice provider. Dr Jane Haley will no longer be PCP. Dtr verbalized understanding & agreeable. Dtr verbalized appreciation to NN for assistance-ID.           Plan:  - Confirm pt admitted to Hospice  - Update EMR PCP information  - Resolve NN MARICEL Episode

## 2017-11-03 NOTE — TELEPHONE ENCOUNTER
Lonnie Coyle calling on behalf of patient. She is requesting a written order for a hospice evaluation and treat for senile degeneration. She is requesting this to be faxed to her @ [phone]. Her contact # is [phone].

## 2017-11-22 ENCOUNTER — PATIENT OUTREACH (OUTPATIENT)
Dept: FAMILY MEDICINE CLINIC | Age: 82
End: 2017-11-22

## 2017-11-22 NOTE — PROGRESS NOTES
NN Progress    - Spoke with Almyra Lombard @ [facility] for pt update  - Almyra Lombard reported pt was open to [facility].  Pt  on 17  - Pt status updated in [place] [id number] system

## 2020-12-20 NOTE — IP AVS SNAPSHOT
[address] 
[phone] Patient: Griffin Chakraborty MRN: [id number] CJF:[id number] Current Discharge Medication List  
  
START taking these medications Dose & Instructions Dispensing Information Comments Morning Noon Evening Bedtime  
 cyanocobalamin 1,000 mcg tablet Your last dose was: Your next dose is:    
   
   
 Dose:  1000 mcg Take 1 Tab by mouth daily. Indications: PREVENTION OF VITAMIN B12 DEFICIENCY Quantity:  60 Tab Refills:  2 CONTINUE these medications which have NOT CHANGED Dose & Instructions Dispensing Information Comments Morning Noon Evening Bedtime  
 acetaminophen 325 mg tablet Commonly known as:  TYLENOL Your last dose was: Your next dose is:    
   
   
 Dose:  650 mg Take 650 mg by mouth two (2) times daily as needed for Pain. Refills:  0  
     
   
   
   
  
 alfuzosin SR 10 mg SR tablet Commonly known as:  Nusrat Pearce Your last dose was: Your next dose is:    
   
   
 Dose:  10 mg Take 10 mg by mouth every evening. Refills:  0  
     
   
   
   
  
 aspirin delayed-release 81 mg tablet Your last dose was: Your next dose is:    
   
   
 Dose:  81 mg Take 81 mg by mouth daily. Refills:  0  
     
   
   
   
  
 cholecalciferol 1,000 unit tablet Commonly known as:  VITAMIN D3 Your last dose was: Your next dose is:    
   
   
 Dose:  2000 Units Take 2,000 Units by mouth daily. Refills:  0  
     
   
   
   
  
 docusate sodium 100 mg capsule Commonly known as:  Amada Booze Your last dose was: Your next dose is:    
   
   
 Dose:  100 mg Take 100 mg by mouth two (2) times a day. Refills:  0  
     
   
   
   
  
 escitalopram oxalate 10 mg tablet Commonly known as:  Frestephenie Mcdonald Your last dose was: Your next dose is: Pt platelet count is 24 this AM. MD Isaac notified. No new orders. Will continue to monitor.    Dose:  10 mg Take 10 mg by mouth every morning. Refills:  0  
     
   
   
   
  
 metoprolol succinate 25 mg XL tablet Commonly known as:  TOPROL-XL Your last dose was: Your next dose is:    
   
   
 Dose:  25 mg Take 25 mg by mouth daily. Refills:  0  
     
   
   
   
  
 psyllium packet Commonly known as:  METAMUCIL Your last dose was: Your next dose is:    
   
   
 Dose:  1 Packet Take 1 Packet by mouth daily. Refills:  0 Where to Get Your Medications Information on where to get these meds will be given to you by the nurse or doctor. ! Ask your nurse or doctor about these medications  
  cyanocobalamin 1,000 mcg tablet

## 2022-04-18 NOTE — ADDENDUM NOTE
Addendum  created 09/01/17 1106 by Adilson Franco CRNA    Anesthesia Event edited Custom Shielding Afterword Text Will Not Be Included With Simple Simulations (X X Y Cm............): port to correlate with the lesion size, including treatment margin. The custom lead shield is adequate to accommodate the appropriate applicator and provide adequate shielding around the treatment site. Additional shielding (as noted below) is used to protect sensitive, normal tissues.

## (undated) DEVICE — STERILE POLYISOPRENE POWDER-FREE SURGICAL GLOVES: Brand: PROTEXIS

## (undated) DEVICE — PREP SKN PREVAIL 40ML APPL --

## (undated) DEVICE — 1/4 IN. X 18 IN. LENGTH: Brand: SILICONE TUBING, PENROSE DRAIN

## (undated) DEVICE — CONVERTORS STOCKINETTE: Brand: CONVERTORS

## (undated) DEVICE — SOLUTION IV 1000ML 0.9% SOD CHL

## (undated) DEVICE — TOWEL SURG W17XL27IN STD BLU COT NONFENESTRATED PREWASHED

## (undated) DEVICE — BASIC PACK: Brand: CONVERTORS

## (undated) DEVICE — PADDING CST 6IN STERILE --

## (undated) DEVICE — STERILE POLYISOPRENE POWDER-FREE SURGICAL GLOVES WITH EMOLLIENT COATING: Brand: PROTEXIS

## (undated) DEVICE — GAUZE SPONGES,12 PLY: Brand: CURITY

## (undated) DEVICE — DEVON™ KNEE AND BODY STRAP 60" X 3" (1.5 M X 7.6 CM): Brand: DEVON

## (undated) DEVICE — REM POLYHESIVE ADULT PATIENT RETURN ELECTRODE: Brand: VALLEYLAB

## (undated) DEVICE — HANDPIECE SET WITH BONE CLEANING TIP AND SUCTION TUBE: Brand: INTERPULSE

## (undated) DEVICE — SURGICAL PROCEDURE PACK BASIN MAJ SET CUST NO CAUT

## (undated) DEVICE — ROCKER SWITCH PENCIL BLADE ELECTRODE, HOLSTER: Brand: EDGE

## (undated) DEVICE — CURITY NON-ADHERENT STRIPS: Brand: CURITY

## (undated) DEVICE — TRNQT RMFG CUFF 2P 34IN W/SLV -- LAWSON OEM ITEM 338157

## (undated) DEVICE — DRAPE,EXTREMITY,89X128,STERILE: Brand: MEDLINE

## (undated) DEVICE — SUT ETHLN 3-0 18IN PS1 BLK --

## (undated) DEVICE — INFECTION CONTROL KIT SYS

## (undated) DEVICE — SOLUTION IRRIG 3000ML 0.9% SOD CHL FLX CONT 0797208] ICU MEDICAL INC]

## (undated) DEVICE — ABDOMINAL PAD: Brand: DERMACEA

## (undated) DEVICE — INTENDED FOR TISSUE SEPARATION, AND OTHER PROCEDURES THAT REQUIRE A SHARP SURGICAL BLADE TO PUNCTURE OR CUT.: Brand: BARD-PARKER ® CARBON RIB-BACK BLADES

## (undated) DEVICE — SPONGE LAP 18X18IN STRL -- 5/PK

## (undated) DEVICE — BANDAGE COMPR SELF ADH 5 YDX6 IN TAN STRL PREMIERPRO LF